# Patient Record
Sex: FEMALE | Race: WHITE | NOT HISPANIC OR LATINO | Employment: FULL TIME | URBAN - METROPOLITAN AREA
[De-identification: names, ages, dates, MRNs, and addresses within clinical notes are randomized per-mention and may not be internally consistent; named-entity substitution may affect disease eponyms.]

---

## 2018-06-14 ENCOUNTER — APPOINTMENT (OUTPATIENT)
Dept: URGENT CARE | Facility: CLINIC | Age: 38
End: 2018-06-14

## 2018-06-14 DIAGNOSIS — Z02.1 PRE-EMPLOYMENT HEALTH SCREENING EXAMINATION: Primary | ICD-10-CM

## 2018-06-14 PROCEDURE — 86480 TB TEST CELL IMMUN MEASURE: CPT | Performed by: NURSE PRACTITIONER

## 2018-06-14 PROCEDURE — 86706 HEP B SURFACE ANTIBODY: CPT | Performed by: NURSE PRACTITIONER

## 2018-06-15 LAB — HBV SURFACE AB SER-ACNC: <3.1 MIU/ML

## 2018-06-18 LAB — QUANTIFERON-TB GOLD IN TUBE: NORMAL

## 2018-08-27 ENCOUNTER — APPOINTMENT (OUTPATIENT)
Dept: LAB | Facility: HOSPITAL | Age: 38
End: 2018-08-27

## 2018-08-27 ENCOUNTER — TRANSCRIBE ORDERS (OUTPATIENT)
Dept: ADMINISTRATIVE | Facility: HOSPITAL | Age: 38
End: 2018-08-27

## 2018-08-27 DIAGNOSIS — Z00.8 HEALTH EXAMINATION IN POPULATION SURVEY: Primary | ICD-10-CM

## 2018-08-27 DIAGNOSIS — Z00.8 HEALTH EXAMINATION IN POPULATION SURVEY: ICD-10-CM

## 2018-08-27 LAB
CHOLEST SERPL-MCNC: 190 MG/DL
EST. AVERAGE GLUCOSE BLD GHB EST-MCNC: 117 MG/DL
HBA1C MFR BLD: 5.7 % (ref 4.2–6.3)
HDLC SERPL-MCNC: 36 MG/DL (ref 40–59)
LDLC SERPL CALC-MCNC: 127 MG/DL
NONHDLC SERPL-MCNC: 154 MG/DL
TRIGL SERPL-MCNC: 136 MG/DL

## 2018-08-27 PROCEDURE — 36415 COLL VENOUS BLD VENIPUNCTURE: CPT

## 2018-08-27 PROCEDURE — 83036 HEMOGLOBIN GLYCOSYLATED A1C: CPT

## 2018-08-27 PROCEDURE — 80061 LIPID PANEL: CPT

## 2018-10-16 ENCOUNTER — OFFICE VISIT (OUTPATIENT)
Dept: URGENT CARE | Facility: MEDICAL CENTER | Age: 38
End: 2018-10-16
Payer: COMMERCIAL

## 2018-10-16 VITALS
DIASTOLIC BLOOD PRESSURE: 80 MMHG | HEIGHT: 62 IN | OXYGEN SATURATION: 100 % | WEIGHT: 248 LBS | RESPIRATION RATE: 20 BRPM | BODY MASS INDEX: 45.64 KG/M2 | HEART RATE: 96 BPM | SYSTOLIC BLOOD PRESSURE: 122 MMHG | TEMPERATURE: 97.5 F

## 2018-10-16 DIAGNOSIS — J02.9 SORE THROAT: Primary | ICD-10-CM

## 2018-10-16 LAB — S PYO AG THROAT QL: NEGATIVE

## 2018-10-16 PROCEDURE — 99203 OFFICE O/P NEW LOW 30 MIN: CPT | Performed by: PHYSICIAN ASSISTANT

## 2018-10-16 PROCEDURE — S9088 SERVICES PROVIDED IN URGENT: HCPCS | Performed by: PHYSICIAN ASSISTANT

## 2018-10-16 PROCEDURE — 87430 STREP A AG IA: CPT | Performed by: PHYSICIAN ASSISTANT

## 2018-10-16 RX ORDER — BROMPHENIRAMINE MALEATE, PSEUDOEPHEDRINE HYDROCHLORIDE, AND DEXTROMETHORPHAN HYDROBROMIDE 2; 30; 10 MG/5ML; MG/5ML; MG/5ML
10 SYRUP ORAL 4 TIMES DAILY PRN
Qty: 118 ML | Refills: 0 | Status: SHIPPED | OUTPATIENT
Start: 2018-10-16 | End: 2018-10-21

## 2018-10-16 RX ORDER — LEVOTHYROXINE SODIUM 0.05 MG/1
50 TABLET ORAL DAILY
COMMUNITY
End: 2019-03-06 | Stop reason: SDUPTHER

## 2018-10-16 NOTE — PATIENT INSTRUCTIONS
Acute Cough   AMBULATORY CARE:   An acute cough  can last up to 3 weeks  Common causes of an acute cough include a cold, allergies, or a lung infection  Seek care immediately if:   · You have trouble breathing or feel short of breath  · You cough up blood, or you see blood in your mucus  · You faint or feel weak or dizzy  · You have chest pain when you cough or take a deep breath  · You have new wheezing  Contact your healthcare provider if:   · You have a fever  · Your cough lasts longer than 4 weeks  · Your symptoms do not improve with treatment  · You have questions or concerns about your condition or care  Treatment:  An acute cough usually goes away on its own  Ask your healthcare provider about medicines you can take to decrease your cough  You may need medicine to stop the cough, decrease swelling in your airways, or help open your airways  Medicine may also be given to help you cough up mucus  If you have an infection caused by bacteria, you may need antibiotics  Manage your symptoms:   · Do not smoke and stay away from others who smoke  Nicotine and other chemicals in cigarettes and cigars can cause lung damage and make your cough worse  Ask your healthcare provider for information if you currently smoke and need help to quit  E-cigarettes or smokeless tobacco still contain nicotine  Talk to your healthcare provider before you use these products  · Drink extra liquids as directed  Liquids will help thin and loosen mucus so you can cough it up  Liquids will also help prevent dehydration  Examples of good liquids to drink include water, fruit juice, and broth  Do not drink liquids that contain caffeine  Caffeine can increase your risk for dehydration  Ask your healthcare provider how much liquid to drink each day  · Rest as directed  Do not do activities that make your cough worse, such as exercise  · Use a humidifier or vaporizer    Use a cool mist humidifier or a vaporizer to increase air moisture in your home  This may make it easier for you to breathe and help decrease your cough  · Eat 2 to 5 mL of honey 2 times each day  Honey can help thin mucus and decrease your cough  · Use cough drops or lozenges  These can help decrease throat irritation and your cough  Follow up with your healthcare provider as directed:  Write down your questions so you remember to ask them during your visits  © 2017 [facility] Information is for End User's use only and may not be sold, redistributed or otherwise used for commercial purposes  All illustrations and images included in CareNotes® are the copyrighted property of A D [employer] M [employer] Inc  or Ed Chiang  The above information is an  only  It is not intended as medical advice for individual conditions or treatments  Talk to your doctor, nurse or pharmacist before following any medical regimen to see if it is safe and effective for you

## 2018-10-16 NOTE — PROGRESS NOTES
[facility] [facility]      NAME: Kimi Pimentel is a 45 y o  female  : 1980    MRN: [id number]  DATE: 2018  TIME: 10:10 AM    Assessment and Plan   Sore throat [J02 9]  1  Sore throat  POCT rapid strepA    brompheniramine-pseudoephedrine-DM 30-2-10 MG/5ML syrup       Patient Instructions     In office rapid strep was negative, await throat culture  Take prescribed medication as directed  Encourage increase intake of fluids  May alternate Tylenol/ibuprofen as needed for discomfort  Using a humidifier in the room may be on benefit  Saline Nasal Spray and throat lozenges as needed  Please go to ER if any signs of distress or systemic infections  Recommend symptomatic management at this time and follow up with PCP if symptoms persist in 5-7 days  Chief Complaint     Chief Complaint   Patient presents with    Cough     for 2 days  Denies fever , chills or bodyaches   Sore Throat         History of Present Illness   Kimi Pimentel presents to the clinic c/o      51-year-old female, presents for evaluation of a cough, x2 days  She does have a history of seasonal allergies  She denies any fever, respiratory distress, chest pain, shortness of breath difficulty breathing        Review of Systems   Review of Systems   Constitutional: Negative for fever  Respiratory: Positive for cough  Cardiovascular: Negative for chest pain           Current Medications     Long-Term Prescriptions   Medication Sig Dispense Refill    levothyroxine 50 mcg tablet Take 50 mcg by mouth daily         Current Allergies     Allergies as of 10/16/2018    (No Known Allergies)            The following portions of the patient's history were reviewed and updated as appropriate: allergies, current medications, past family history, past medical history, past social history, past surgical history and problem list     HISTORICAL INFO:  Past Medical History:   Diagnosis Date    Disease of thyroid gland      No past surgical history on file  Objective   /80 (BP Location: Right arm, Patient Position: Sitting, Cuff Size: Standard)   Pulse 96   Temp 97 5 °F (36 4 °C)   Resp 20   Ht 5' 2" (1 575 m)   Wt 112 kg (248 lb)   SpO2 100%   BMI 45 36 kg/m²        Physical Exam     Physical Exam   Constitutional: She appears well-developed and well-nourished  No distress  HENT:   Head: Normocephalic and atraumatic  Mouth/Throat: Oropharynx is clear and moist    Eyes: Pupils are equal, round, and reactive to light  Conjunctivae are normal    Neck: Normal range of motion  Neck supple  No tracheal deviation present  No thyromegaly present  Cardiovascular: Normal rate, regular rhythm and normal heart sounds  Pulmonary/Chest: Effort normal and breath sounds normal  No respiratory distress  She has no wheezes  She has no rales  Lymphadenopathy:     She has no cervical adenopathy  Skin: Skin is warm and dry  She is not diaphoretic  Nursing note and vitals reviewed  M*Modal software was used to dictate this note  It may contain errors with dictating incorrect words/spelling  Please contact provider directly for any questions

## 2018-11-23 ENCOUNTER — APPOINTMENT (EMERGENCY)
Dept: RADIOLOGY | Facility: HOSPITAL | Age: 38
End: 2018-11-23
Payer: COMMERCIAL

## 2018-11-23 ENCOUNTER — HOSPITAL ENCOUNTER (EMERGENCY)
Facility: HOSPITAL | Age: 38
Discharge: HOME | End: 2018-11-23
Attending: EMERGENCY MEDICINE | Admitting: EMERGENCY MEDICINE
Payer: COMMERCIAL

## 2018-11-23 VITALS
OXYGEN SATURATION: 99 % | HEART RATE: 113 BPM | RESPIRATION RATE: 17 BRPM | BODY MASS INDEX: 44.35 KG/M2 | DIASTOLIC BLOOD PRESSURE: 91 MMHG | WEIGHT: 241 LBS | SYSTOLIC BLOOD PRESSURE: 154 MMHG | HEIGHT: 62 IN | TEMPERATURE: 99.1 F

## 2018-11-23 DIAGNOSIS — J40 BRONCHITIS: Primary | ICD-10-CM

## 2018-11-23 LAB — S PYO AG THROAT QL: NEGATIVE

## 2018-11-23 PROCEDURE — 99283 EMERGENCY DEPT VISIT LOW MDM: CPT

## 2018-11-23 PROCEDURE — 94640 AIRWAY INHALATION TREATMENT: CPT

## 2018-11-23 PROCEDURE — 71046 X-RAY EXAM CHEST 2 VIEWS: CPT

## 2018-11-23 PROCEDURE — 87430 STREP A AG IA: CPT | Performed by: NURSE PRACTITIONER

## 2018-11-23 RX ORDER — BENZONATATE 200 MG/1
200 CAPSULE ORAL 3 TIMES DAILY PRN
Qty: 15 CAPSULE | Refills: 0 | Status: SHIPPED | OUTPATIENT
Start: 2018-11-23 | End: 2018-11-28

## 2018-11-23 RX ORDER — ALBUTEROL SULFATE 90 UG/1
2 AEROSOL, METERED RESPIRATORY (INHALATION) EVERY 4 HOURS PRN
Qty: 1 INHALER | Refills: 0 | Status: SHIPPED | OUTPATIENT
Start: 2018-11-23 | End: 2020-11-13 | Stop reason: SDUPTHER

## 2018-11-23 RX ORDER — PREDNISONE 20 MG/1
60 TABLET ORAL ONCE
Status: COMPLETED | OUTPATIENT
Start: 2018-11-23 | End: 2018-11-23

## 2018-11-23 RX ORDER — ACETAMINOPHEN 325 MG/1
975 TABLET ORAL ONCE
Status: COMPLETED | OUTPATIENT
Start: 2018-11-23 | End: 2018-11-23

## 2018-11-23 RX ORDER — PREDNISONE 20 MG/1
60 TABLET ORAL DAILY
Qty: 15 TABLET | Refills: 0 | Status: SHIPPED | OUTPATIENT
Start: 2018-11-23 | End: 2018-11-28

## 2018-11-23 RX ORDER — BENZONATATE 100 MG/1
200 CAPSULE ORAL ONCE
Status: COMPLETED | OUTPATIENT
Start: 2018-11-23 | End: 2018-11-23

## 2018-11-23 RX ORDER — IPRATROPIUM BROMIDE AND ALBUTEROL SULFATE 2.5; .5 MG/3ML; MG/3ML
3 SOLUTION RESPIRATORY (INHALATION) ONCE
Status: COMPLETED | OUTPATIENT
Start: 2018-11-23 | End: 2018-11-23

## 2018-11-23 RX ADMIN — LIDOCAINE HYDROCHLORIDE 10 ML: 20 SOLUTION ORAL; TOPICAL at 07:44

## 2018-11-23 RX ADMIN — ACETAMINOPHEN 975 MG: 325 TABLET ORAL at 07:45

## 2018-11-23 RX ADMIN — IPRATROPIUM BROMIDE AND ALBUTEROL SULFATE 3 ML: 2.5; .5 SOLUTION RESPIRATORY (INHALATION) at 07:45

## 2018-11-23 RX ADMIN — PREDNISONE 60 MG: 20 TABLET ORAL at 08:40

## 2018-11-23 RX ADMIN — BENZONATATE 200 MG: 100 CAPSULE ORAL at 08:39

## 2018-11-23 NOTE — ED PROVIDER NOTES
History  Chief Complaint   Patient presents with    Cough     I have a cough for 2 days, along with pain on swallowing  I get chest discomfort/ pain when I take a deep breath  Patient is a 58-year-old female presenting to the emergency department reporting a 2 day complaint of worsening sore throat with the development of a hacking, productive cough of yellow/green mucus  Patient denies any known fevers, but reports feeling chilled and hot  She reports some mild headache and nasal congestion, which she says is typical of her seasonal allergy symptoms  Additionally reporting some chest pain/tightness with cough  No report of cardiac chest pain/discomfort  Denies any lightheaded or dizziness  No abdominal pain, nausea, vomiting or diarrhea  No rash  Otherwise reports no other symptoms  Denies any other concerns  Prior to Admission Medications   Prescriptions Last Dose Informant Patient Reported? Taking?   levothyroxine 50 mcg tablet   Yes No   Sig: Take 50 mcg by mouth daily      Facility-Administered Medications: None       Past Medical History:   Diagnosis Date    Disease of thyroid gland        History reviewed  No pertinent surgical history  History reviewed  No pertinent family history  I have reviewed and agree with the history as documented  Social History   Substance Use Topics    Smoking status: Never Smoker    Smokeless tobacco: Never Used    Alcohol use No        Review of Systems   Constitutional: Positive for chills and fatigue  Negative for fever  HENT: Positive for congestion, postnasal drip and sore throat  Negative for ear discharge, ear pain, facial swelling, nosebleeds, rhinorrhea, sinus pain, sinus pressure, tinnitus, trouble swallowing and voice change  Eyes: Negative for pain, discharge, redness and itching  Respiratory: Positive for cough and chest tightness  Negative for shortness of breath and wheezing      Cardiovascular: Negative for chest pain and palpitations  Gastrointestinal: Negative for abdominal pain, diarrhea, nausea and vomiting  Musculoskeletal: Negative for arthralgias, joint swelling, neck pain and neck stiffness  Skin: Negative for rash  Allergic/Immunologic: Negative for immunocompromised state  Neurological: Negative for dizziness, light-headedness, numbness and headaches  Hematological: Negative for adenopathy  Physical Exam  Physical Exam   Constitutional: She is oriented to person, place, and time  She appears well-developed and well-nourished  Non-toxic appearance  She does not have a sickly appearance  She does not appear ill  No distress  HENT:   Right Ear: Tympanic membrane and ear canal normal    Left Ear: Tympanic membrane and ear canal normal    Nose: Mucosal edema present  No rhinorrhea, nose lacerations, sinus tenderness or nasal deformity  Right sinus exhibits no maxillary sinus tenderness and no frontal sinus tenderness  Left sinus exhibits no maxillary sinus tenderness and no frontal sinus tenderness  Mouth/Throat: Uvula is midline and mucous membranes are normal  No trismus in the jaw  No uvula swelling  Posterior oropharyngeal erythema present  No oropharyngeal exudate, posterior oropharyngeal edema or tonsillar abscesses  Tonsils are 2+ on the right  Tonsils are 2+ on the left  No tonsillar exudate  Eyes: Conjunctivae are normal    Neck: Normal range of motion  Neck supple  Cardiovascular: Regular rhythm  Tachycardia present  Pulmonary/Chest: Effort normal  No accessory muscle usage  No tachypnea  No respiratory distress  She has no decreased breath sounds  She has wheezes (Diffuse, bilateral, on expiration  Seems to clear/change with cough )  She has no rhonchi  She has no rales  She exhibits tenderness  She exhibits no crepitus  Lymphadenopathy:     She has no cervical adenopathy  Neurological: She is alert and oriented to person, place, and time  Skin: Skin is warm and dry   No rash noted    Psychiatric: She has a normal mood and affect  Nursing note and vitals reviewed  Vital Signs  ED Triage Vitals   Temperature Pulse Respirations Blood Pressure SpO2   11/23/18 0720 11/23/18 0720 11/23/18 0720 11/23/18 0720 11/23/18 0720   98 2 °F (36 8 °C) (!) 108 18 (!) 187/104 97 %      Temp Source Heart Rate Source Patient Position - Orthostatic VS BP Location FiO2 (%)   11/23/18 0720 11/23/18 0720 11/23/18 0720 11/23/18 0720 --   Tymp Core Monitor Sitting Left arm       Pain Score       11/23/18 0745       4           Vitals:    11/23/18 0720 11/23/18 0843   BP: (!) 187/104 154/91   Pulse: (!) 108 (!) 113   Patient Position - Orthostatic VS: Sitting Sitting       Visual Acuity      ED Medications  Medications   acetaminophen (TYLENOL) tablet 975 mg (975 mg Oral Given 11/23/18 0745)   ipratropium-albuterol (DUO-NEB) 0 5-2 5 mg/3 mL inhalation solution 3 mL (3 mL Nebulization Given 11/23/18 0745)   lidocaine viscous (XYLOCAINE) 2 % mucosal solution 10 mL (10 mL Swish & Swallow Given 11/23/18 0744)   predniSONE tablet 60 mg (60 mg Oral Given 11/23/18 0840)   benzonatate (TESSALON PERLES) capsule 200 mg (200 mg Oral Given 11/23/18 0839)       Diagnostic Studies  Results Reviewed     Procedure Component Value Units Date/Time    Rapid Strep A Screen Only, Adults [[id number]]  (Normal) Collected:  11/23/18 0746    Lab Status:  Final result Specimen:  Throat from Throat Updated:  11/23/18 0805     Rapid Strep A Screen Negative                 XR chest 2 views   Final Result by Roger Ramirez MD (11/23 2180)      No acute cardiopulmonary disease  Workstation performed: [id number]                    Procedures  Procedures       Phone Contacts  ED Phone Contact    ED Course                               MDM  Number of Diagnoses or Management Options  Bronchitis: new and requires workup  Diagnosis management comments: Strep swab negative  Chest x-ray shows no acute pulmonary abnormality    Will treat symptomatically for bronchitis  Patient was instructed follow up with her primary care provider early next week for re-evaluation  Instructed to return to the ED with any worsening symptoms or emergent concerns  Amount and/or Complexity of Data Reviewed  Clinical lab tests: ordered and reviewed  Tests in the radiology section of CPT®: ordered and reviewed  Independent visualization of images, tracings, or specimens: yes    Patient Progress  Patient progress: stable    CritCare Time    Disposition  Final diagnoses:   Bronchitis     Time reflects when diagnosis was documented in both MDM as applicable and the Disposition within this note     Time User Action Codes Description Comment    11/23/2018  8:23 AM Dimitry [address] [facility] Bronchitis       ED Disposition     ED Disposition Condition Comment    Discharge  Kimi Pimentel discharge to home/self care  Condition at discharge: Stable        Follow-up Information     Follow up With Specialties Details Why Contact Info    Charles Loera  In 3 days For re-evaluation/follow-up, as discussed   [address] [facility] Emergency Department Emergency Medicine Go to As needed, If symptoms worsen [address] [address] [phone]          Patient's Medications   Discharge Prescriptions    ALBUTEROL (PROVENTIL HFA,VENTOLIN HFA) 90 MCG/ACT INHALER    Inhale 2 puffs every 4 (four) hours as needed for wheezing       Start Date: 11/23/2018End Date: --       Order Dose: 2 puffs       Quantity: 1 Inhaler    Refills: 0    BENZONATATE (TESSALON) 200 MG CAPSULE    Take 1 capsule (200 mg total) by mouth 3 (three) times a day as needed for cough for up to 5 days       Start Date: 11/23/2018End Date: 11/28/2018       Order Dose: 200 mg       Quantity: 15 capsule    Refills: 0    PREDNISONE 20 MG TABLET    Take 3 tablets (60 mg total) by mouth daily for 5 days       Start Date: 11/23/2018End Date: 11/28/2018       Order Dose: 60 mg       Quantity: 15 tablet    Refills: 0     No discharge procedures on file      ED Provider  Electronically Signed by           RAFAELA Jarquin  11/23/18 2088

## 2018-11-23 NOTE — DISCHARGE INSTRUCTIONS
Acute Bronchitis   WHAT YOU NEED TO KNOW:   Acute bronchitis is swelling and irritation in the air passages of your lungs  This irritation may cause you to cough or have other breathing problems  Acute bronchitis often starts because of another illness, such as a cold or the flu  The illness spreads from your nose and throat to your windpipe and airways  Bronchitis is often called a chest cold  Acute bronchitis lasts about 3 to 6 weeks and is usually not a serious illness  Your cough can last for several weeks  DISCHARGE INSTRUCTIONS:   Return to the emergency department if:   · You cough up blood  · Your lips or fingernails turn blue  · You feel like you are not getting enough air when you breathe  Contact your healthcare provider if:   · You have a fever  · Your breathing problems do not go away or get worse  · Your cough does not get better within 4 weeks  · You have questions or concerns about your condition or care  Self-care:   · Get more rest   Rest helps your body to heal  Slowly start to do more each day  Rest when you feel it is needed  · Avoid irritants in the air  Avoid chemicals, fumes, and dust  Wear a face mask if you must work around dust or fumes  Stay inside on days when air pollution levels are high  If you have allergies, stay inside when pollen counts are high  Do not use aerosol products, such as spray-on deodorant, bug spray, and hair spray  · Do not smoke or be around others who smoke  Nicotine and other chemicals in cigarettes and cigars damages the cilia that move mucus out of your lungs  Ask your healthcare provider for information if you currently smoke and need help to quit  E-cigarettes or smokeless tobacco still contain nicotine  Talk to your healthcare provider before you use these products  · Drink liquids as directed  Liquids help keep your air passages moist and help you cough up mucus   You may need to drink more liquids when you have acute bronchitis  Ask how much liquid to drink each day and which liquids are best for you  · Use a humidifier or vaporizer  Use a cool mist humidifier or a vaporizer to increase air moisture in your home  This may make it easier for you to breathe and help decrease your cough  Decrease risk for acute bronchitis:   · Get the vaccinations you need  Ask your healthcare provider if you should get vaccinated against the flu or pneumonia  · Prevent the spread of germs  You can decrease your risk of acute bronchitis and other illnesses by doing the following:     [facility] your hands often with soap and water  Carry germ-killing hand lotion or gel with you  You can use the lotion or gel to clean your hands when soap and water are not available  ¨ Do not touch your eyes, nose, or mouth unless you have washed your hands first     ¨ Always cover your mouth when you cough to prevent the spread of germs  It is best to cough into a tissue or your shirt sleeve instead of into your hand  Ask those around you cover their mouths when they cough  ¨ Try to avoid people who have a cold or the flu  If you are sick, stay away from others as much as possible  Medicines: Your healthcare provider may  give you any of the following:  · Ibuprofen or acetaminophen  are medicines that help lower your fever  They are available without a doctor's order  Ask your healthcare provider which medicine is right for you  Ask how much to take and how often to take it  Follow directions  These medicines can cause stomach bleeding if not taken correctly  Ibuprofen can cause kidney damage  Do not take ibuprofen if you have kidney disease, an ulcer, or allergies to aspirin  Acetaminophen can cause liver damage  Do not take more than 4,000 milligrams in 24 hours  · Decongestants  help loosen mucus in your lungs and make it easier to cough up  This can help you breathe easier  · Cough suppressants  decrease your urge to cough   If your cough produces mucus, do not take a cough suppressant unless your healthcare provider tells you to  Your healthcare provider may suggest that you take a cough suppressant at night so you can rest     · Inhalers  may be given  Your healthcare provider may give you one or more inhalers to help you breathe easier and cough less  An inhaler gives your medicine to open your airways  Ask your healthcare provider to show you how to use your inhaler correctly  · Take your medicine as directed  Contact your healthcare provider if you think your medicine is not helping or if you have side effects  Tell him of her if you are allergic to any medicine  Keep a list of the medicines, vitamins, and herbs you take  Include the amounts, and when and why you take them  Bring the list or the pill bottles to follow-up visits  Carry your medicine list with you in case of an emergency  Follow up with your healthcare provider as directed:  Write down questions you have so you will remember to ask them during your follow-up visits  © 2017 [employer] [employer] Information is for End User's use only and may not be sold, redistributed or otherwise used for commercial purposes  All illustrations and images included in CareNotes® are the copyrighted property of [employer]  or Ed Chiang  The above information is an  only  It is not intended as medical advice for individual conditions or treatments  Talk to your doctor, nurse or pharmacist before following any medical regimen to see if it is safe and effective for you

## 2019-01-02 ENCOUNTER — HOSPITAL ENCOUNTER (EMERGENCY)
Facility: HOSPITAL | Age: 39
Discharge: HOME | End: 2019-01-03
Attending: EMERGENCY MEDICINE | Admitting: EMERGENCY MEDICINE
Payer: COMMERCIAL

## 2019-01-02 DIAGNOSIS — B34.9 VIRAL SYNDROME: Primary | ICD-10-CM

## 2019-01-02 LAB
BASOPHILS # BLD AUTO: 0 THOUSANDS/ΜL (ref 0–0.1)
BASOPHILS NFR BLD AUTO: 0 % (ref 0–1)
EOSINOPHIL # BLD AUTO: 0.1 THOUSAND/ΜL (ref 0–0.4)
EOSINOPHIL NFR BLD AUTO: 1 % (ref 0–6)
ERYTHROCYTE [DISTWIDTH] IN BLOOD BY AUTOMATED COUNT: 18.9 %
EXT PREG TEST URINE: NEGATIVE
HCT VFR BLD AUTO: 42.8 % (ref 36–46)
HGB BLD-MCNC: 13.3 G/DL (ref 12–16)
LYMPHOCYTES # BLD AUTO: 0.7 THOUSANDS/ΜL (ref 0.5–4)
LYMPHOCYTES NFR BLD AUTO: 10 % (ref 25–45)
MCH RBC QN AUTO: 23.6 PG (ref 26–34)
MCHC RBC AUTO-ENTMCNC: 31 G/DL (ref 31–36)
MCV RBC AUTO: 76 FL (ref 80–100)
MONOCYTES # BLD AUTO: 0.3 THOUSAND/ΜL (ref 0.2–0.9)
MONOCYTES NFR BLD AUTO: 3 % (ref 1–10)
NEUTROPHILS # BLD AUTO: 6.5 THOUSANDS/ΜL (ref 1.8–7.8)
NEUTS SEG NFR BLD AUTO: 85 % (ref 45–65)
PLATELET # BLD AUTO: 279 THOUSANDS/UL (ref 150–450)
PMV BLD AUTO: 9.4 FL (ref 8.9–12.7)
RBC # BLD AUTO: 5.63 MILLION/UL (ref 4–5.2)
WBC # BLD AUTO: 7.6 THOUSAND/UL (ref 4.5–11)

## 2019-01-02 PROCEDURE — 81025 URINE PREGNANCY TEST: CPT | Performed by: EMERGENCY MEDICINE

## 2019-01-02 PROCEDURE — 80053 COMPREHEN METABOLIC PANEL: CPT | Performed by: EMERGENCY MEDICINE

## 2019-01-02 PROCEDURE — 85025 COMPLETE CBC W/AUTO DIFF WBC: CPT | Performed by: EMERGENCY MEDICINE

## 2019-01-02 PROCEDURE — 36415 COLL VENOUS BLD VENIPUNCTURE: CPT | Performed by: EMERGENCY MEDICINE

## 2019-01-02 PROCEDURE — 83690 ASSAY OF LIPASE: CPT | Performed by: EMERGENCY MEDICINE

## 2019-01-02 PROCEDURE — 96374 THER/PROPH/DIAG INJ IV PUSH: CPT

## 2019-01-02 PROCEDURE — 99284 EMERGENCY DEPT VISIT MOD MDM: CPT

## 2019-01-02 RX ORDER — ONDANSETRON 2 MG/ML
4 INJECTION INTRAMUSCULAR; INTRAVENOUS ONCE
Status: COMPLETED | OUTPATIENT
Start: 2019-01-02 | End: 2019-01-02

## 2019-01-02 RX ADMIN — ONDANSETRON HYDROCHLORIDE 4 MG: 2 INJECTION, SOLUTION INTRAMUSCULAR; INTRAVENOUS at 23:49

## 2019-01-03 VITALS
SYSTOLIC BLOOD PRESSURE: 122 MMHG | WEIGHT: 232.56 LBS | TEMPERATURE: 99.1 F | OXYGEN SATURATION: 96 % | RESPIRATION RATE: 20 BRPM | DIASTOLIC BLOOD PRESSURE: 65 MMHG | HEART RATE: 94 BPM | BODY MASS INDEX: 42.54 KG/M2

## 2019-01-03 LAB
ALBUMIN SERPL BCP-MCNC: 4.5 G/DL (ref 3–5.2)
ALP SERPL-CCNC: 108 U/L (ref 43–122)
ALT SERPL W P-5'-P-CCNC: 40 U/L (ref 9–52)
ANION GAP SERPL CALCULATED.3IONS-SCNC: 9 MMOL/L (ref 5–14)
ANISOCYTOSIS BLD QL SMEAR: PRESENT
AST SERPL W P-5'-P-CCNC: 28 U/L (ref 14–36)
BILIRUB SERPL-MCNC: 0.6 MG/DL
BUN SERPL-MCNC: 14 MG/DL (ref 5–25)
CALCIUM SERPL-MCNC: 9.7 MG/DL (ref 8.4–10.2)
CHLORIDE SERPL-SCNC: 104 MMOL/L (ref 97–108)
CO2 SERPL-SCNC: 25 MMOL/L (ref 22–30)
CREAT SERPL-MCNC: 0.71 MG/DL (ref 0.6–1.2)
GFR SERPL CREATININE-BSD FRML MDRD: 108 ML/MIN/1.73SQ M
GLUCOSE SERPL-MCNC: 100 MG/DL (ref 70–99)
LIPASE SERPL-CCNC: 52 U/L (ref 23–300)
MICROCYTES BLD QL AUTO: PRESENT
PLATELET BLD QL SMEAR: ADEQUATE
POTASSIUM SERPL-SCNC: 4.2 MMOL/L (ref 3.6–5)
PROT SERPL-MCNC: 8.5 G/DL (ref 5.9–8.4)
RBC MORPH BLD: NORMAL
SODIUM SERPL-SCNC: 138 MMOL/L (ref 137–147)

## 2019-01-03 PROCEDURE — 96361 HYDRATE IV INFUSION ADD-ON: CPT

## 2019-01-03 RX ORDER — ACETAMINOPHEN 325 MG/1
650 TABLET ORAL ONCE
Status: COMPLETED | OUTPATIENT
Start: 2019-01-03 | End: 2019-01-03

## 2019-01-03 RX ORDER — ONDANSETRON 8 MG/1
8 TABLET, ORALLY DISINTEGRATING ORAL EVERY 8 HOURS PRN
Qty: 20 TABLET | Refills: 0 | Status: SHIPPED | OUTPATIENT
Start: 2019-01-03 | End: 2019-03-06 | Stop reason: ALTCHOICE

## 2019-01-03 RX ADMIN — ACETAMINOPHEN 650 MG: 325 TABLET ORAL at 02:46

## 2019-01-03 RX ADMIN — SODIUM CHLORIDE 1000 ML: 0.9 INJECTION, SOLUTION INTRAVENOUS at 02:38

## 2019-01-03 RX ADMIN — SODIUM CHLORIDE 1000 ML: 9 INJECTION, SOLUTION INTRAVENOUS at 00:04

## 2019-01-03 NOTE — ED TRIAGE NOTES
Reports her kids were sick over [place]  Pt complaining of dizziness and nausea  Reports she just vomited not too long abd  Reports mid upper abd - constant, describes as  Sharp  Denies fevers   Started today

## 2019-01-03 NOTE — DISCHARGE INSTRUCTIONS
Viral Syndrome   WHAT YOU NEED TO KNOW:   Viral syndrome is a term used for a viral infection that has no clear cause  Viruses are spread easily from person to person through the air and on shared items  DISCHARGE INSTRUCTIONS:   Call 911 for the following:   · You have a seizure  · You cannot be woken  · You have chest pain or trouble breathing  Return to the emergency department if:   · You have a stiff neck, a bad headache, and sensitivity to light  · You feel weak, dizzy, or confused  · You stop urinating or urinate a lot less than normal      · You cough up blood or thick, yellow or green, mucus  · You have severe abdominal pain or your abdomen is larger than usual   Contact your healthcare provider if:   · Your symptoms do not get better with treatment, or get worse, after 3 days  · You have a rash or ear pain  · You have burning when you urinate  · You have questions or concerns about your condition or care  Medicines: You may  need any of the following:  · Acetaminophen  decreases pain and fever  It is available without a doctor's order  Ask how much medicine to take and how often to take it  Follow directions  Acetaminophen can cause liver damage if not taken correctly  · NSAIDs , such as ibuprofen, help decrease swelling, pain, and fever  NSAIDs can cause stomach bleeding or kidney problems in certain people  If you take blood thinner medicine, always ask your healthcare provider if NSAIDs are safe for you  Always read the medicine label and follow directions  · Cold medicine  helps decrease swelling, control a cough, and relieve chest or nasal congestion  · Saline nasal spray  helps decrease nasal congestion  · Take your medicine as directed  Contact your healthcare provider if you think your medicine is not helping or if you have side effects  Tell him of her if you are allergic to any medicine   Keep a list of the medicines, vitamins, and herbs you take  Include the amounts, and when and why you take them  Bring the list or the pill bottles to follow-up visits  Carry your medicine list with you in case of an emergency  Manage your symptoms:   · Drink liquids as directed  to prevent dehydration  Ask how much liquid to drink each day and which liquids are best for you  Ask if you should drink an oral rehydration solution (ORS)  An ORS has the right amounts of water, salts, and sugar you need to replace body fluids  This may help prevent dehydration caused by vomiting or diarrhea  Do not drink liquids with caffeine  Drinks with caffeine can make dehydration worse  · Get plenty of rest  to help your body heal  Take naps throughout the day  Ask your healthcare provider when you can return to work and your normal activities  · Use a cool mist humidifier  to help you breathe easier if you have nasal or chest congestion  Ask your healthcare provider how to use a cool mist humidifier  · Eat honey or use cough drops  to help decrease throat discomfort  Ask your healthcare provider how much honey you should eat each day  Cough drops are available without a doctor's order  Follow directions for taking cough drops  · Do not smoke and stay away from others who smoke  Nicotine and other chemicals in cigarettes and cigars can cause lung damage  Smoking can also delay healing  Ask your healthcare provider for information if you currently smoke and need help to quit  E-cigarettes or smokeless tobacco still contain nicotine  Talk to your healthcare provider before you use these products  · Wash your hands frequently  to prevent the spread of germs to others  Use soap and water  Use gel hand  when soap and water are not available  Wash your hands after you use the bathroom, cough, or sneeze  Wash your hands before you prepare or eat food    Follow up with your healthcare provider as directed:  Write down your questions so you remember to ask them during your visits  © 2017 [address] [employer] Information is for End User's use only and may not be sold, redistributed or otherwise used for commercial purposes  All illustrations and images included in CareNotes® are the copyrighted property of A D A M [employer] Inc  or Ed Chiang  The above information is an  only  It is not intended as medical advice for individual conditions or treatments  Talk to your doctor, nurse or pharmacist before following any medical regimen to see if it is safe and effective for you

## 2019-01-03 NOTE — ED PROVIDER NOTES
History  Chief Complaint   Patient presents with    Abdominal Pain    Dizziness     46 y/o female c/o nausea, vomiting, diarrhea, and dizziness which started earlier this afternoon after arriving for work  She states that she has been around her children who all experienced similar symptoms  She states that the dizziness is intermittent but worsens upon standing  She states that she is unable to keep anything down by mouth and hasn't been able to eat anything since this morning  Abdominal Pain   Associated symptoms: diarrhea, nausea and vomiting    Dizziness   Associated symptoms: diarrhea, nausea and vomiting        Prior to Admission Medications   Prescriptions Last Dose Informant Patient Reported? Taking? albuterol (PROVENTIL HFA,VENTOLIN HFA) 90 mcg/act inhaler   No No   Sig: Inhale 2 puffs every 4 (four) hours as needed for wheezing   levothyroxine 50 mcg tablet   Yes No   Sig: Take 50 mcg by mouth daily      Facility-Administered Medications: None       Past Medical History:   Diagnosis Date    Disease of thyroid gland        Past Surgical History:   Procedure Laterality Date     SECTION      CHOLECYSTECTOMY         History reviewed  No pertinent family history  I have reviewed and agree with the history as documented  Social History   Substance Use Topics    Smoking status: Never Smoker    Smokeless tobacco: Never Used    Alcohol use No        Review of Systems   Constitutional: Positive for appetite change  Gastrointestinal: Positive for abdominal pain, diarrhea, nausea and vomiting  Neurological: Positive for dizziness  All other systems reviewed and are negative  Physical Exam  Physical Exam   Constitutional: She is oriented to person, place, and time  She appears well-developed and well-nourished  Obese    HENT:   Head: Normocephalic and atraumatic  Mouth/Throat: Oropharyngeal exudate present  Eyes: Pupils are equal, round, and reactive to light   EOM are normal    Neck: Normal range of motion  Neck supple  No thyromegaly present  Cardiovascular: Regular rhythm and normal heart sounds  Pulmonary/Chest: Effort normal and breath sounds normal    Abdominal: Soft  She exhibits distension  Bowel sounds are increased  There is tenderness in the left lower quadrant  There is no tenderness at McBurney's point and negative Ramirez's sign  No hernia  Musculoskeletal: Normal range of motion  Neurological: She is alert and oriented to person, place, and time  Skin: Skin is warm and dry  Capillary refill takes less than 2 seconds  Psychiatric: She has a normal mood and affect  Vitals reviewed        Vital Signs  ED Triage Vitals [01/02/19 2244]   Temperature Pulse Respirations Blood Pressure SpO2   99 1 °F (37 3 °C) (!) 122 (!) 24 155/81 97 %      Temp Source Heart Rate Source Patient Position - Orthostatic VS BP Location FiO2 (%)   Tympanic Monitor Sitting Left arm --      Pain Score       5           Vitals:    01/02/19 2244   BP: 155/81   Pulse: (!) 122   Patient Position - Orthostatic VS: Sitting       Visual Acuity      ED Medications  Medications   sodium chloride 0 9 % bolus 1,000 mL (0 mL Intravenous Stopped 1/3/19 0115)   ondansetron (ZOFRAN) injection 4 mg (4 mg Intravenous Given 1/2/19 2349)   sodium chloride 0 9 % bolus 1,000 mL (1,000 mL Intravenous New Bag 1/3/19 0238)   acetaminophen (TYLENOL) tablet 650 mg (650 mg Oral Given 1/3/19 0246)       Diagnostic Studies  Results Reviewed     Procedure Component Value Units Date/Time    Lipase [[id number]]  (Normal) Collected:  01/02/19 2343    Lab Status:  Final result Specimen:  Blood from Line, Venous Updated:  01/03/19 0005     Lipase 52 u/L     Comprehensive metabolic panel [[id number]]  (Abnormal) Collected:  01/02/19 2343    Lab Status:  Final result Specimen:  Blood from Line, Venous Updated:  01/03/19 0004     Sodium 138 mmol/L      Potassium 4 2 mmol/L      Chloride 104 mmol/L      CO2 25 mmol/L ANION GAP 9 mmol/L      BUN 14 mg/dL      Creatinine 0 71 mg/dL      Glucose 100 (H) mg/dL      Calcium 9 7 mg/dL      AST 28 U/L      ALT 40 U/L      Alkaline Phosphatase 108 U/L      Total Protein 8 5 (H) g/dL      Albumin 4 5 g/dL      Total Bilirubin 0 60 mg/dL      eGFR 108 ml/min/1 73sq m     Narrative:         National Kidney Disease Education Program recommendations are as follows:  GFR calculation is accurate only with a steady state creatinine  Chronic Kidney disease less than 60 ml/min/1 73 sq  meters  Kidney failure less than 15 ml/min/1 73 sq  meters      CBC and differential [[id number]]  (Abnormal) Collected:  01/02/19 2343    Lab Status:  Final result Specimen:  Blood from Line, Venous Updated:  01/02/19 2355     WBC 7 60 Thousand/uL      RBC 5 63 (H) Million/uL      Hemoglobin 13 3 g/dL      Hematocrit 42 8 %      MCV 76 (L) fL      MCH 23 6 (L) pg      MCHC 31 0 g/dL      RDW 18 9 (H) %      MPV 9 4 fL      Platelets 723 Thousands/uL      Neutrophils Relative 85 (H) %      Lymphocytes Relative 10 (L) %      Monocytes Relative 3 %      Eosinophils Relative 1 %      Basophils Relative 0 %      Neutrophils Absolute 6 50 Thousands/µL      Lymphocytes Absolute 0 70 Thousands/µL      Monocytes Absolute 0 30 Thousand/µL      Eosinophils Absolute 0 10 Thousand/µL      Basophils Absolute 0 00 Thousands/µL     POCT pregnancy, urine [[id number]]  (Normal) Resulted:  01/02/19 2329    Lab Status:  Final result Updated:  01/02/19 2329     EXT PREG TEST UR (Ref: Negative) Negative                 No orders to display              Procedures  Procedures       Phone Contacts  ED Phone Contact    ED Course                               MDM  CritCare Time    Disposition  Final diagnoses:   Viral syndrome     Time reflects when diagnosis was documented in both MDM as applicable and the Disposition within this note     Time User Action Codes Description Comment    1/3/2019  3:48 AM Milbert Samples Add [B34 9] Viral syndrome       ED Disposition     ED Disposition Condition Comment    Discharge  Fadumo De La Cruz discharge to home/self care  Condition at discharge: Good        Follow-up Information     Follow up With Specialties Details Why Contact Info    Ashlie Channel  In 1 week  [address] [facility] [place] [id number] [phone]            Patient's Medications   Discharge Prescriptions    No medications on file     No discharge procedures on file      ED Provider  Electronically Signed by           Mariaelena Butterfield DO  01/03/19 9838

## 2019-01-28 ENCOUNTER — OFFICE VISIT (OUTPATIENT)
Dept: URGENT CARE | Age: 39
End: 2019-01-28
Payer: COMMERCIAL

## 2019-01-28 VITALS
RESPIRATION RATE: 18 BRPM | TEMPERATURE: 97.5 F | BODY MASS INDEX: 41.22 KG/M2 | SYSTOLIC BLOOD PRESSURE: 131 MMHG | OXYGEN SATURATION: 97 % | WEIGHT: 224 LBS | HEIGHT: 62 IN | HEART RATE: 91 BPM | DIASTOLIC BLOOD PRESSURE: 73 MMHG

## 2019-01-28 DIAGNOSIS — J02.9 SORE THROAT: ICD-10-CM

## 2019-01-28 DIAGNOSIS — J06.9 ACUTE URI: Primary | ICD-10-CM

## 2019-01-28 LAB — S PYO AG THROAT QL: NEGATIVE

## 2019-01-28 PROCEDURE — 87430 STREP A AG IA: CPT | Performed by: FAMILY MEDICINE

## 2019-01-28 PROCEDURE — S9088 SERVICES PROVIDED IN URGENT: HCPCS | Performed by: FAMILY MEDICINE

## 2019-01-28 PROCEDURE — 99213 OFFICE O/P EST LOW 20 MIN: CPT | Performed by: FAMILY MEDICINE

## 2019-01-28 RX ORDER — BENZONATATE 100 MG/1
100 CAPSULE ORAL 3 TIMES DAILY PRN
Qty: 20 CAPSULE | Refills: 0 | Status: SHIPPED | OUTPATIENT
Start: 2019-01-28 | End: 2019-03-06 | Stop reason: ALTCHOICE

## 2019-01-28 RX ORDER — BENZONATATE 100 MG/1
100 CAPSULE ORAL 3 TIMES DAILY PRN
Qty: 20 CAPSULE | Refills: 0 | Status: SHIPPED | OUTPATIENT
Start: 2019-01-28 | End: 2019-01-28 | Stop reason: SDUPTHER

## 2019-01-28 RX ORDER — AMOXICILLIN 875 MG/1
875 TABLET, COATED ORAL 2 TIMES DAILY
Qty: 20 TABLET | Refills: 0 | Status: SHIPPED | OUTPATIENT
Start: 2019-01-28 | End: 2019-02-07

## 2019-01-29 NOTE — PATIENT INSTRUCTIONS
As we discussed this is most likely VIRAL at this time  I would recommend starting Flonase and antihistamine to help with sinus pressure and congestion  Neti pot and nasal saline flushes can be helpful  Patient is insistent that this is a sinus infection as she has had them before  Discussed with patient I will give her a script to hang onto and she should only start antibiotic if in the next 48 hours symptoms are not improving she develops fevers  Recommended that she establish care with new family doctor  Offer to call the care now provider line to help set up appointment or give information for family doctor but she declined  Go to the ER with worsening symptoms

## 2019-01-29 NOTE — PROGRESS NOTES
[facility]        NAME: Aj Decker is a 44 y o  female  : 1980    MRN: [id number]  DATE: 2019  TIME: 9:43 PM    Assessment and Plan   Acute URI [J06 9]  1  Acute URI  amoxicillin (AMOXIL) 875 mg tablet    benzonatate (TESSALON PERLES) 100 mg capsule    DISCONTINUED: benzonatate (TESSALON PERLES) 100 mg capsule   2  Sore throat  POCT rapid strepA     Patient very insistent that the symptoms were typical for her sinus infections  Discussed with patient that this is still most likely viral as symptoms have only been there for 1 day  Patient states that she knows her body and this is how they start as this will turn into a sinus infection  Discussed with patient if she has recurrent sinu infections that she should follow up within ENT doctor  She states she recently moved to the area and has not established care here yet  Declined my offer to help her establish care here  Strict instructions given to only start antibiotic if symptoms persist over the next 48 hours  Patient verbalized understanding  Patient Instructions     Patient Instructions   As we discussed this is most likely VIRAL at this time  I would recommend starting Flonase and antihistamine to help with sinus pressure and congestion  Neti pot and nasal saline flushes can be helpful  Patient is insistent that this is a sinus infection as she has had them before  Discussed with patient I will give her a script to hang onto and she should only start antibiotic if in the next 48 hours symptoms are not improving she develops fevers  Recommended that she establish care with new family doctor  Offer to call the care now provider line to help set up appointment or give information for family doctor but she declined  Go to the ER with worsening symptoms        Chief Complaint     Chief Complaint   Patient presents with    Cough     Sinus pressure more toward the right side , hurt to breath, mucus color green and running nose since yesterday   Headache    Dizziness         History of Present Illness   Zbigniew Del Cid presents to the clinic c/o    This is a 44year old female here today with cough, congestion, rhinorrhea, sinus pressure for 1 day  No fevers but has had chills  No body aches  No ear pain  She has some pressure in right ear and head  She also complains of sore throat  No chest pain, sob  No nausea or vomiting  She is using Mucinex OTC with some mild relief  Patient very insistent that these are symptoms of her typical sinus infections  She states she gets about 3-4 sinus infections a year  This would be her for sinus infection for this winter  Review of Systems   Review of Systems   Constitutional: Negative  HENT: Positive for congestion, sinus pain, sinus pressure and sore throat  Respiratory: Positive for cough  Cardiovascular: Negative  Psychiatric/Behavioral: Negative  Current Medications     Long-Term Prescriptions   Medication Sig Dispense Refill    levothyroxine 50 mcg tablet Take 50 mcg by mouth daily      ondansetron (ZOFRAN-ODT) 8 mg disintegrating tablet Take 1 tablet (8 mg total) by mouth every 8 (eight) hours as needed for nausea or vomiting (Patient not taking: Reported on 1/28/2019 ) 20 tablet 0       Current Allergies     Allergies as of 01/28/2019    (No Known Allergies)            The following portions of the patient's history were reviewed and updated as appropriate: allergies, current medications, past family history, past medical history, past social history, past surgical history and problem list     Objective   /73   Pulse 91   Temp 97 5 °F (36 4 °C) (Temporal)   Resp 18   Ht 5' 2" (1 575 m)   Wt 102 kg (224 lb)   LMP 01/28/2019   SpO2 97%   BMI 40 97 kg/m²        Physical Exam     Physical Exam   Constitutional: She is oriented to person, place, and time  She appears well-developed and well-nourished     HENT:   Head: Normocephalic  Right Ear: External ear normal    Left Ear: External ear normal    Mouth/Throat: Oropharynx is clear and moist    Significant tenderness to palpate over the maxillary sinus on the right side  Neck: Normal range of motion  Neck supple  Pulmonary/Chest: Effort normal and breath sounds normal  No respiratory distress  She has no wheezes  She has no rales  She exhibits no tenderness  Neurological: She is alert and oriented to person, place, and time  Psychiatric: She has a normal mood and affect  Her behavior is normal    Nursing note and vitals reviewed

## 2019-03-06 ENCOUNTER — OFFICE VISIT (OUTPATIENT)
Dept: FAMILY MEDICINE CLINIC | Facility: CLINIC | Age: 39
End: 2019-03-06
Payer: COMMERCIAL

## 2019-03-06 VITALS
DIASTOLIC BLOOD PRESSURE: 80 MMHG | SYSTOLIC BLOOD PRESSURE: 130 MMHG | WEIGHT: 229.2 LBS | HEIGHT: 62 IN | BODY MASS INDEX: 42.18 KG/M2 | HEART RATE: 80 BPM | RESPIRATION RATE: 16 BRPM

## 2019-03-06 DIAGNOSIS — E66.01 MORBID OBESITY WITH BMI OF 40.0-44.9, ADULT (HCC): ICD-10-CM

## 2019-03-06 DIAGNOSIS — E53.8 VITAMIN B12 DEFICIENCY: ICD-10-CM

## 2019-03-06 DIAGNOSIS — B37.2 CANDIDIASIS, INTERTRIGO: ICD-10-CM

## 2019-03-06 DIAGNOSIS — E03.9 ACQUIRED HYPOTHYROIDISM: Primary | ICD-10-CM

## 2019-03-06 PROCEDURE — 99203 OFFICE O/P NEW LOW 30 MIN: CPT | Performed by: FAMILY MEDICINE

## 2019-03-06 RX ORDER — LEVOTHYROXINE SODIUM 0.05 MG/1
50 TABLET ORAL DAILY
Qty: 30 TABLET | Refills: 1 | Status: SHIPPED | OUTPATIENT
Start: 2019-03-06 | End: 2019-06-06 | Stop reason: SDUPTHER

## 2019-03-06 RX ORDER — NYSTATIN 100000 U/G
OINTMENT TOPICAL 2 TIMES DAILY
Qty: 30 G | Refills: 1 | Status: SHIPPED | OUTPATIENT
Start: 2019-03-06 | End: 2019-06-06 | Stop reason: ALTCHOICE

## 2019-03-07 NOTE — PROGRESS NOTES
Assessment/Plan:  1  Acquired hypothyroidism  - continue Levothyroxine 50 mcg daily, will recheck TSH  - TSH, 3rd generation with Free T4 reflex; Future  - levothyroxine 50 mcg tablet; Take 1 tablet (50 mcg total) by mouth daily  Dispense: 30 tablet; Refill: 1    2  Candidiasis, intertrigo  - keep the site dry, start Nystatin oint twice a day, also discussed using Nystatin powder for prevention  - follow up if not better  - nystatin (MYCOSTATIN) ointment; Apply topically 2 (two) times a day tTo affected areas  Dispense: 30 g; Refill: 1    3  Vitamin B12 deficiency  - will recheck vitamin B12  - Vitamin B12; Future    4  Morbid obesity with BMI of 40 0-44 9, adult [facility])  - continue life style changes    Follow up in July or August for annual physical or sooner as needed  Possible side effects of new medications were reviewed with the patient today  The treatment plan was reviewed with the patient  The patient understands and agrees with the treatment plan      Subjective:   Chief Complaint   Patient presents with    Np to be established    Medication Refill      Patient ID: Gretchen Watters is a 44 y o  female who presents today for her initial visit as a new patient to be established  Patient reports history of hypothyroidism and has been on Levothyroxine 50 mcg daily for over 5 years  She also reports hx of vitamin B12 deficiency and used to get monthly B12 injections  Patient states she lost over 50 lbs since June by improving her diet, she also joined a gym and started exercising regularly  Patient reports recurrent erythematous rash under her abdominal skin fold in the past few months, she has been trying to keep the area dry, but has not tried any OTC creams       The following portions of the patient's history were reviewed and updated as appropriate: allergies, current medications, past family history, past medical history, past social history, past surgical history and problem list     Past Medical History:   Diagnosis Date    Disease of thyroid gland     Hypothyroidism      Past Surgical History:   Procedure Laterality Date     SECTION      CHOLECYSTECTOMY      DENTAL SURGERY      [place] teeth extraction     Family History   Problem Relation Age of Onset   [facility] COPD Mother     Heart disease Mother    [facility] HIV Father     Alcohol abuse Father     Substance Abuse Father     Seizures Father     Seizures Sister     Bipolar disorder Brother     Seizures Daughter     Hypothyroidism Paternal Grandmother      Social History     Socioeconomic History    Marital status: Single     Spouse name: Not on file    Number of children: Not on file    Years of education: Not on file    Highest education level: Not on file   Occupational History    Not on file   Social Needs    Financial resource strain: Not on file    Food insecurity:     Worry: Not on file     Inability: Not on file    Transportation needs:     Medical: Not on file     Non-medical: Not on file   Tobacco Use    Smoking status: Never Smoker    Smokeless tobacco: Never Used   Substance and Sexual Activity    Alcohol use: No    Drug use: No    Sexual activity: Yes     Partners: Male   Lifestyle    Physical activity:     Days per week: Not on file     Minutes per session: Not on file    Stress: Not on file   Relationships    Social connections:     Talks on phone: Not on file     Gets together: Not on file     Attends Denominational service: Not on file     Active member of club or organization: Not on file     Attends meetings of clubs or organizations: Not on file     Relationship status: Not on file    Intimate partner violence:     Fear of current or ex partner: Not on file     Emotionally abused: Not on file     Physically abused: Not on file     Forced sexual activity: Not on file   Other Topics Concern    Not on file   Social History Narrative    Not on file       Current Outpatient Medications:     albuterol (PROVENTIL HFA,VENTOLIN HFA) 90 mcg/act inhaler, Inhale 2 puffs every 4 (four) hours as needed for wheezing, Disp: 1 Inhaler, Rfl: 0    levothyroxine 50 mcg tablet, Take 1 tablet (50 mcg total) by mouth daily, Disp: 30 tablet, Rfl: 1    nystatin (MYCOSTATIN) ointment, Apply topically 2 (two) times a day tTo affected areas, Disp: 30 g, Rfl: 1    Review of Systems   Constitutional: Negative for appetite change, chills, fatigue, fever and unexpected weight change  HENT: Negative for congestion, rhinorrhea, sore throat and trouble swallowing  Eyes: Negative for pain, discharge, redness, itching and visual disturbance  Respiratory: Negative for cough, shortness of breath and wheezing  Cardiovascular: Negative for chest pain, palpitations and leg swelling  Gastrointestinal: Negative for abdominal pain, blood in stool, constipation, diarrhea, nausea and vomiting  Endocrine: Negative for cold intolerance, heat intolerance, polydipsia, polyphagia and polyuria  Genitourinary: Negative for difficulty urinating, dysuria, flank pain, frequency, hematuria, pelvic pain and urgency  Musculoskeletal: Negative for arthralgias, joint swelling and myalgias  Skin: Positive for rash  Neurological: Negative for dizziness, syncope, weakness, numbness and headaches  Hematological: Negative for adenopathy  Psychiatric/Behavioral: Negative for dysphoric mood and sleep disturbance  The patient is not nervous/anxious  Objective:    Vitals:    03/06/19 1350   BP: 130/80   BP Location: Left arm   Patient Position: Sitting   Cuff Size: Large   Pulse: 80   Resp: 16   Weight: 104 kg (229 lb 3 2 oz)   Height: 5' 2" (1 575 m)        Physical Exam   Constitutional: She is oriented to person, place, and time  She appears well-developed and well-nourished  No distress  HENT:   Head: Normocephalic and atraumatic     Right Ear: Tympanic membrane and ear canal normal    Left Ear: Tympanic membrane and ear canal normal    Nose: Nose normal    Mouth/Throat: Oropharynx is clear and moist    Eyes: Pupils are equal, round, and reactive to light  Conjunctivae and EOM are normal  No scleral icterus  Neck: Neck supple  No thyromegaly present  Cardiovascular: Normal rate, regular rhythm and normal heart sounds  No murmur heard  Pulmonary/Chest: Effort normal  No respiratory distress  She has no wheezes  She has no rhonchi  She has no rales  Abdominal: Soft  She exhibits no distension and no mass  There is no tenderness  Musculoskeletal: She exhibits no edema  Lymphadenopathy:     She has no cervical adenopathy  Neurological: She is alert and oriented to person, place, and time  No cranial nerve deficit  Skin:   Right side lower abdomen under skin fold large area of erythema with slight scaling on edges and satellite papules   Psychiatric: She has a normal mood and affect  Her behavior is normal  Thought content normal          Lab Results   Component Value Date    HDL 36 (L) 08/27/2018     Lab Results   Component Value Date    LDLCALC 127 08/27/2018     Lab Results   Component Value Date    TRIG 136 08/27/2018     Lab Results   Component Value Date    HGBA1C 5 7 08/27/2018       BMI Counseling: Body mass index is 41 92 kg/m²  Discussed the patient's BMI with her  The BMI is above average  BMI counseling and education was provided to the patient  Nutrition recommendations include reducing portion sizes, decreasing overall calorie intake, consuming healthier snacks, moderation in carbohydrate intake and reducing intake of saturated fat and trans fat  Exercise recommendations include moderate aerobic physical activity for 150 minutes/week and strength training exercises

## 2019-03-12 ENCOUNTER — APPOINTMENT (EMERGENCY)
Dept: RADIOLOGY | Facility: HOSPITAL | Age: 39
End: 2019-03-12
Payer: COMMERCIAL

## 2019-03-12 ENCOUNTER — HOSPITAL ENCOUNTER (EMERGENCY)
Facility: HOSPITAL | Age: 39
Discharge: HOME | End: 2019-03-13
Attending: EMERGENCY MEDICINE | Admitting: EMERGENCY MEDICINE
Payer: COMMERCIAL

## 2019-03-12 VITALS
HEART RATE: 110 BPM | OXYGEN SATURATION: 96 % | RESPIRATION RATE: 22 BRPM | DIASTOLIC BLOOD PRESSURE: 76 MMHG | SYSTOLIC BLOOD PRESSURE: 161 MMHG | WEIGHT: 229 LBS | BODY MASS INDEX: 41.88 KG/M2 | TEMPERATURE: 97.9 F

## 2019-03-12 DIAGNOSIS — J40 BRONCHITIS: ICD-10-CM

## 2019-03-12 DIAGNOSIS — J32.9 SINUSITIS: Primary | ICD-10-CM

## 2019-03-12 PROCEDURE — 99283 EMERGENCY DEPT VISIT LOW MDM: CPT

## 2019-03-12 RX ORDER — AMOXICILLIN AND CLAVULANATE POTASSIUM 875; 125 MG/1; MG/1
1 TABLET, FILM COATED ORAL EVERY 12 HOURS
Qty: 14 TABLET | Refills: 0 | Status: SHIPPED | OUTPATIENT
Start: 2019-03-12 | End: 2019-03-19

## 2019-03-12 RX ORDER — AMOXICILLIN AND CLAVULANATE POTASSIUM 875; 125 MG/1; MG/1
1 TABLET, FILM COATED ORAL ONCE
Status: DISCONTINUED | OUTPATIENT
Start: 2019-03-13 | End: 2019-03-13

## 2019-03-13 RX ORDER — AMOXICILLIN AND CLAVULANATE POTASSIUM 875; 125 MG/1; MG/1
1 TABLET, FILM COATED ORAL ONCE
Status: COMPLETED | OUTPATIENT
Start: 2019-03-13 | End: 2019-03-13

## 2019-03-13 RX ADMIN — AMOXICILLIN AND CLAVULANATE POTASSIUM 1 TABLET: 875; 125 TABLET, FILM COATED ORAL at 00:07

## 2019-03-14 ENCOUNTER — OFFICE VISIT (OUTPATIENT)
Dept: FAMILY MEDICINE CLINIC | Facility: CLINIC | Age: 39
End: 2019-03-14
Payer: COMMERCIAL

## 2019-03-14 VITALS
RESPIRATION RATE: 20 BRPM | SYSTOLIC BLOOD PRESSURE: 154 MMHG | BODY MASS INDEX: 41.04 KG/M2 | DIASTOLIC BLOOD PRESSURE: 68 MMHG | HEART RATE: 88 BPM | WEIGHT: 223 LBS | HEIGHT: 62 IN

## 2019-03-14 DIAGNOSIS — R05.9 COUGH: Primary | ICD-10-CM

## 2019-03-14 PROCEDURE — 1036F TOBACCO NON-USER: CPT | Performed by: NURSE PRACTITIONER

## 2019-03-14 PROCEDURE — 99213 OFFICE O/P EST LOW 20 MIN: CPT | Performed by: NURSE PRACTITIONER

## 2019-03-14 PROCEDURE — 3008F BODY MASS INDEX DOCD: CPT | Performed by: NURSE PRACTITIONER

## 2019-03-14 RX ORDER — ALBUTEROL SULFATE 2.5 MG/3ML
2.5 SOLUTION RESPIRATORY (INHALATION) EVERY 6 HOURS PRN
Qty: 25 VIAL | Refills: 4 | Status: SHIPPED | OUTPATIENT
Start: 2019-03-14 | End: 2020-09-23 | Stop reason: SDUPTHER

## 2019-03-14 RX ORDER — PREDNISONE 10 MG/1
TABLET ORAL
Qty: 30 TABLET | Refills: 0 | Status: SHIPPED | OUTPATIENT
Start: 2019-03-14 | End: 2019-07-16 | Stop reason: ALTCHOICE

## 2019-03-14 NOTE — PROGRESS NOTES
Assessment/Plan:    1  Bronchitis  Continue the MDI and we renewed the albuterol for the neb you have at home  In the meantime we will start prednisone taper  Please finish the antibiotic that was prescribed  Call if you are not getting better  - Peak flow  - predniSONE 10 mg tablet; Take 5,5,4,4,3,3,2,2,1,1  Dispense: 30 tablet; Refill: 0     rto prn     Subjective:      Patient ID: Alexi Petty is a 44 y o  female  Here today with madhuri renzo for er follow up  Started about one week ago with a cough that was not too bothersome at first  Became progressively worse  3/12/2019 went to the er  ( [place]) because feels like the air was getting stuck in her lungs  Cough became worse  While in the er thought that there could be pneumonia  No cxr was done  Was started on augmentin and proventil MDI    Still has the cough that is almost choking and gasping  Was also given a recuse MDI that she is doing every 4-6 hours  Does not feel that this is making a difference  No fevers  Is sweating in the am    Since treated about 48 hours ago is not feeling any improvement  OBJECTIVE  Past Medical History:   Diagnosis Date    Disease of thyroid gland     Hypothyroidism      temporarily  Wt Readings from Last 3 Encounters:   03/14/19 101 kg (223 lb)   03/12/19 104 kg (229 lb)   03/06/19 104 kg (229 lb 3 2 oz)     BP Readings from Last 3 Encounters:   03/14/19 154/68   03/12/19 161/76   03/06/19 130/80     Pulse Readings from Last 3 Encounters:   03/14/19 88   03/12/19 (!) 110   03/06/19 80     BMI Readings from Last 3 Encounters:   03/14/19 40 79 kg/m²   03/12/19 41 88 kg/m²   03/06/19 41 92 kg/m²        Physical Exam   Constitutional: She appears well-developed and well-nourished  HENT:   Tm's clear  turbs open with no discharge  pharynx benign   Neck: Normal range of motion  Neck supple  Cardiovascular: Normal rate and regular rhythm     Pulmonary/Chest: Effort normal    Scattered wheezes through out No rales     Psychiatric: She has a normal mood and affect   Her behavior is normal  Judgment and thought content normal

## 2019-03-14 NOTE — LETTER
March 14, 2019     Patient: Joaquín Cazares   YOB: 1980   Date of Visit: 3/14/2019       To Whom it May Concern:    Joaquín Cazares is under my professional care  She was seen in my office on 3/14/2019  She may return to work on 3/18/2019  If you have any questions or concerns, please don't hesitate to call           Sincerely,          RAFAELA Galeas        CC: No Recipients

## 2019-05-17 ENCOUNTER — APPOINTMENT (OUTPATIENT)
Dept: LAB | Facility: HOSPITAL | Age: 39
End: 2019-05-17
Payer: COMMERCIAL

## 2019-05-17 DIAGNOSIS — E03.9 ACQUIRED HYPOTHYROIDISM: ICD-10-CM

## 2019-05-17 DIAGNOSIS — E53.8 VITAMIN B12 DEFICIENCY: ICD-10-CM

## 2019-05-17 LAB
TSH SERPL DL<=0.05 MIU/L-ACNC: 3.34 UIU/ML (ref 0.47–4.68)
VIT B12 SERPL-MCNC: 540 PG/ML (ref 100–900)

## 2019-05-17 PROCEDURE — 82607 VITAMIN B-12: CPT

## 2019-05-17 PROCEDURE — 84443 ASSAY THYROID STIM HORMONE: CPT

## 2019-05-17 PROCEDURE — 36415 COLL VENOUS BLD VENIPUNCTURE: CPT

## 2019-06-06 ENCOUNTER — TELEPHONE (OUTPATIENT)
Dept: FAMILY MEDICINE CLINIC | Facility: CLINIC | Age: 39
End: 2019-06-06

## 2019-06-06 DIAGNOSIS — B37.2 CANDIDIASIS, INTERTRIGO: Primary | ICD-10-CM

## 2019-06-06 DIAGNOSIS — E03.9 ACQUIRED HYPOTHYROIDISM: ICD-10-CM

## 2019-06-06 RX ORDER — NYSTATIN 100000 [USP'U]/G
POWDER TOPICAL 2 TIMES DAILY
Qty: 60 G | Refills: 1 | Status: SHIPPED | OUTPATIENT
Start: 2019-06-06 | End: 2019-09-22

## 2019-06-06 RX ORDER — LEVOTHYROXINE SODIUM 0.05 MG/1
50 TABLET ORAL DAILY
Qty: 30 TABLET | Refills: 6 | Status: SHIPPED | OUTPATIENT
Start: 2019-06-06 | End: 2019-09-22

## 2019-07-16 ENCOUNTER — APPOINTMENT (OUTPATIENT)
Dept: LAB | Facility: CLINIC | Age: 39
End: 2019-07-16
Payer: COMMERCIAL

## 2019-07-16 ENCOUNTER — OFFICE VISIT (OUTPATIENT)
Dept: FAMILY MEDICINE CLINIC | Facility: CLINIC | Age: 39
End: 2019-07-16
Payer: COMMERCIAL

## 2019-07-16 VITALS
RESPIRATION RATE: 16 BRPM | DIASTOLIC BLOOD PRESSURE: 80 MMHG | HEART RATE: 80 BPM | BODY MASS INDEX: 39.86 KG/M2 | WEIGHT: 216.6 LBS | SYSTOLIC BLOOD PRESSURE: 130 MMHG | HEIGHT: 62 IN

## 2019-07-16 DIAGNOSIS — R73.01 ELEVATED FASTING GLUCOSE: ICD-10-CM

## 2019-07-16 DIAGNOSIS — E66.9 OBESITY, CLASS II, BMI 35-39.9: ICD-10-CM

## 2019-07-16 DIAGNOSIS — E78.5 HYPERLIPIDEMIA, UNSPECIFIED HYPERLIPIDEMIA TYPE: ICD-10-CM

## 2019-07-16 DIAGNOSIS — N92.0 MENORRHAGIA WITH REGULAR CYCLE: ICD-10-CM

## 2019-07-16 DIAGNOSIS — E03.9 ACQUIRED HYPOTHYROIDISM: ICD-10-CM

## 2019-07-16 DIAGNOSIS — Z00.00 ENCOUNTER FOR WELLNESS EXAMINATION IN ADULT: Primary | ICD-10-CM

## 2019-07-16 LAB
CHOLEST SERPL-MCNC: 261 MG/DL (ref 50–200)
GLUCOSE P FAST SERPL-MCNC: 91 MG/DL (ref 65–99)
HDLC SERPL-MCNC: 66 MG/DL (ref 40–60)
LDLC SERPL CALC-MCNC: 177 MG/DL (ref 0–100)
TRIGL SERPL-MCNC: 91 MG/DL

## 2019-07-16 PROCEDURE — 99395 PREV VISIT EST AGE 18-39: CPT | Performed by: FAMILY MEDICINE

## 2019-07-16 PROCEDURE — 36415 COLL VENOUS BLD VENIPUNCTURE: CPT

## 2019-07-16 PROCEDURE — 80061 LIPID PANEL: CPT

## 2019-07-16 PROCEDURE — 82947 ASSAY GLUCOSE BLOOD QUANT: CPT

## 2019-07-16 NOTE — PROGRESS NOTES
Aj Montgomery is a 44 y o   female and is here for routine health maintenance  History of Present Illness     Well Adult Physical   Patient here for a comprehensive physical exam   Patient started exercising regularly and has been following a low carb diet, avoiding sugary drinks and unhealthy snacks and lost 105 lbs since 2018  Diet and Physical Activity  Diet: well balanced diet  Weight concerns: Patient has class 2 obesity (BMI 35 0-39  9)  Exercise: frequently      Depression Screen  PHQ-9 Depression Screening    PHQ-9:    Frequency of the following problems over the past two weeks:       Little interest or pleasure in doing things:  0 - not at all  Feeling down, depressed, or hopeless:  0 - not at all  PHQ-2 Score:  0     General Health  Hearing: Normal:  bilateral  Vision: no vision problems  Dental: regular dental visits     History:  LMP: 19, periods have been regular but heavy in the past several months and lasting for 4-5 days  : 4  Para: 3    Cancer Screening  Mammogram: indicated in 2020  Pap: last pap smear 3 years ago negative with prior Gyn in [place]    The following portions of the patient's history were reviewed and updated as appropriate: allergies, current medications, past family history, past medical history, past social history, past surgical history and problem list     Review of Systems     Review of Systems   Constitutional: Negative for appetite change, chills, fatigue, fever and unexpected weight change  HENT: Negative for congestion, ear pain, nosebleeds, rhinorrhea, sore throat and trouble swallowing  Eyes: Negative for photophobia, pain, discharge, redness, itching and visual disturbance  Respiratory: Negative for cough, shortness of breath and wheezing  Cardiovascular: Negative for chest pain, palpitations and leg swelling  Gastrointestinal: Negative for abdominal pain, blood in stool, constipation, diarrhea, nausea and vomiting  Endocrine: Negative for cold intolerance, heat intolerance, polydipsia, polyphagia and polyuria  Genitourinary: Negative for dysuria, frequency, hematuria, pelvic pain, urgency, vaginal bleeding and vaginal discharge  Heavy periods   Musculoskeletal: Negative for arthralgias, back pain, gait problem, joint swelling and myalgias  Skin: Negative for rash  Neurological: Negative for dizziness, syncope, weakness, numbness and headaches  Hematological: Negative for adenopathy  Does not bruise/bleed easily  Psychiatric/Behavioral: Negative for dysphoric mood and sleep disturbance  The patient is not nervous/anxious          Past Medical History     Past Medical History:   Diagnosis Date    Disease of thyroid gland     Hypothyroidism        Past Surgical History     Past Surgical History:   Procedure Laterality Date     SECTION      CHOLECYSTECTOMY      DENTAL SURGERY      [place] teeth extraction       Social History     Social History     Socioeconomic History    Marital status: Single     Spouse name: None    Number of children: None    Years of education: None    Highest education level: None   Occupational History    None   Social Needs    Financial resource strain: None    Food insecurity:     Worry: None     Inability: None    Transportation needs:     Medical: None     Non-medical: None   Tobacco Use    Smoking status: Never Smoker    Smokeless tobacco: Never Used   Substance and Sexual Activity    Alcohol use: Yes     Comment: Rare    Drug use: No    Sexual activity: Yes     Partners: Male   Lifestyle    Physical activity:     Days per week: None     Minutes per session: None    Stress: None   Relationships    Social connections:     Talks on phone: None     Gets together: None     Attends Yazdanism service: None     Active member of club or organization: None     Attends meetings of clubs or organizations: None     Relationship status: None    Intimate partner violence:     Fear of current or ex partner: None     Emotionally abused: None     Physically abused: None     Forced sexual activity: None   Other Topics Concern    None   Social History Narrative    None       Family History     Family History   Problem Relation Age of Onset    COPD Mother     Heart disease Mother    Tasha Hernandez HIV Father     Alcohol abuse Father     Substance Abuse Father     Seizures Father     Seizures Sister     Post-traumatic stress disorder Sister     Bipolar disorder Brother     Seizures Daughter     Hypothyroidism Paternal Grandmother     Breast cancer Maternal Aunt        Current Medications       Current Outpatient Medications:     albuterol (2 5 mg/3 mL) 0 083 % nebulizer solution, Take 1 vial (2 5 mg total) by nebulization every 6 (six) hours as needed for wheezing or shortness of breath, Disp: 25 vial, Rfl: 4    albuterol (PROVENTIL HFA,VENTOLIN HFA) 90 mcg/act inhaler, Inhale 2 puffs every 4 (four) hours as needed for wheezing, Disp: 1 Inhaler, Rfl: 0    levothyroxine 50 mcg tablet, Take 1 tablet (50 mcg total) by mouth daily, Disp: 30 tablet, Rfl: 6    nystatin (MYCOSTATIN) powder, Apply topically 2 (two) times a day (Patient taking differently: Apply topically as needed ), Disp: 60 g, Rfl: 1     Allergies     Allergies   Allergen Reactions    Other Allergic Rhinitis     Seasonal  Grass       Objective     /80 (BP Location: Left arm, Patient Position: Sitting, Cuff Size: Adult)   Pulse 80   Resp 16   Ht 5' 2" (1 575 m)   Wt 98 2 kg (216 lb 9 6 oz)   BMI 39 62 kg/m²   Wt Readings from Last 3 Encounters:   07/16/19 98 2 kg (216 lb 9 6 oz)   03/14/19 101 kg (223 lb)   03/12/19 104 kg (229 lb)     BP Readings from Last 3 Encounters:   07/16/19 130/80   03/14/19 154/68   03/12/19 161/76     Pulse Readings from Last 3 Encounters:   07/16/19 80   03/14/19 88   03/12/19 (!) 110     Body mass index is 39 62 kg/m²       Physical Exam   Constitutional: She is oriented to person, place, and time  She appears well-developed and well-nourished  HENT:   Head: Normocephalic and atraumatic  Right Ear: Tympanic membrane, external ear and ear canal normal    Left Ear: Tympanic membrane, external ear and ear canal normal    Nose: Nose normal    Mouth/Throat: Oropharynx is clear and moist    Eyes: Pupils are equal, round, and reactive to light  Conjunctivae and EOM are normal  No scleral icterus  Neck: Neck supple  No thyromegaly present  Cardiovascular: Normal rate, regular rhythm and normal heart sounds  No murmur heard  Pulmonary/Chest: Effort normal and breath sounds normal  No respiratory distress  She has no wheezes  She has no rales  Abdominal: Soft  Bowel sounds are normal  She exhibits no distension and no mass  There is no tenderness  Musculoskeletal: She exhibits no edema  Lymphadenopathy:     She has no cervical adenopathy  Neurological: She is alert and oriented to person, place, and time  She has normal reflexes  No cranial nerve deficit  Skin: Skin is warm and dry  No rash noted  Psychiatric: She has a normal mood and affect   Her behavior is normal  Judgment and thought content normal          Health Maintenance     Health Maintenance   Topic Date Due    DTaP,Tdap,and Td Vaccines (1 - Tdap) 01/23/2001    PAP SMEAR  01/23/2001    INFLUENZA VACCINE  07/01/2019    Depression Screening PHQ  01/28/2020    BMI: Followup Plan  03/06/2020    BMI: Adult  03/14/2020    Pneumococcal Vaccine: 65+ Years (1 of 2 - PCV13) 01/23/2045    Pneumococcal Vaccine: Pediatrics (0 to 5 Years) and At-Risk Patients (6 to 59 Years)  Aged Out    HEPATITIS B VACCINES  Aged [employer] History   Administered Date(s) Administered    INFLUENZA 11/13/2018       Laboratory Results:   Lab Results   Component Value Date    WBC 7 60 01/02/2019    HGB 13 3 01/02/2019    HCT 42 8 01/02/2019    MCV 76 (L) 01/02/2019     01/02/2019     Lab Results   Component Value Date BUN 14 01/02/2019     Lab Results   Component Value Date    GLUC 100 (H) 01/02/2019    ALT 40 01/02/2019    AST 28 01/02/2019     Lipid Profile:   Lab Results   Component Value Date    HDL 36 (L) 08/27/2018     Lab Results   Component Value Date    LDLCALC 127 08/27/2018     Lab Results   Component Value Date    TRIG 136 08/27/2018     Lab Results   Component Value Date    NOT4QUUJPHSA 3 340 05/17/2019       Assessment/Plan     1  Encounter for wellness examination in adult  - Lipid Panel with Direct LDL reflex; Future  - Glucose, fasting; Future    2  Acquired hypothyroidism  - continue Levothyroxine 50 mcg daily, we will recheck TSH in 6 months  - TSH, 3rd generation with Free T4 reflex; Future    3  Obesity, Class II with BMI 39 0-39 9,adult  - continue life style changes    4  Menorrhagia with regular cycle  - we will refer to [facility] for evaluation    Patient Counseling:   · Nutrition: Stressed importance of a well balanced diet, moderation of sodium/saturated fat, caloric balance and sufficient intake of fiber  · Exercise: Stressed the importance of regular exercise with a goal of 150 minutes per week  · Dental Health: Discussed daily flossing and brushing and regular dental visits   · Injury Prevention: Discussed Safety Belts, Safety Helmets, and Smoke Detectors  · Immunizations reviewed, last Tdap 6 years ago after her daughter's birth, we will obtain records  · Discussed benefits of cervical cancer screening and annual Gyn exam  · Discussed the patient's BMI with her  The BMI is above average; BMI management plan is completed    Follow up next physical in 1 year      Dc Ye MD

## 2019-07-19 ENCOUNTER — TELEPHONE (OUTPATIENT)
Dept: FAMILY MEDICINE CLINIC | Facility: CLINIC | Age: 39
End: 2019-07-19

## 2019-07-19 NOTE — TELEPHONE ENCOUNTER
Please call patient, her sugar was normal, but her cholesterol was elevated 261 ( normal < 200)  I recommend for patient to avoid fried/ fatty foods, reduce red meat and shell fish intake  I recommend rechecking her cholesterol in 6 months

## 2019-07-20 ENCOUNTER — TELEPHONE (OUTPATIENT)
Dept: FAMILY MEDICINE CLINIC | Facility: CLINIC | Age: 39
End: 2019-07-20

## 2019-07-21 ENCOUNTER — APPOINTMENT (EMERGENCY)
Dept: RADIOLOGY | Facility: HOSPITAL | Age: 39
End: 2019-07-21
Payer: COMMERCIAL

## 2019-07-21 ENCOUNTER — HOSPITAL ENCOUNTER (EMERGENCY)
Facility: HOSPITAL | Age: 39
Discharge: HOME | End: 2019-07-21
Attending: EMERGENCY MEDICINE
Payer: COMMERCIAL

## 2019-07-21 VITALS
TEMPERATURE: 97.9 F | OXYGEN SATURATION: 96 % | DIASTOLIC BLOOD PRESSURE: 67 MMHG | RESPIRATION RATE: 20 BRPM | BODY MASS INDEX: 39.86 KG/M2 | SYSTOLIC BLOOD PRESSURE: 107 MMHG | HEART RATE: 92 BPM | WEIGHT: 216.6 LBS | HEIGHT: 62 IN

## 2019-07-21 DIAGNOSIS — R11.2 NAUSEA AND VOMITING: ICD-10-CM

## 2019-07-21 DIAGNOSIS — R19.7 DIARRHEA, UNSPECIFIED TYPE: ICD-10-CM

## 2019-07-21 DIAGNOSIS — K52.9 COLITIS: Primary | ICD-10-CM

## 2019-07-21 DIAGNOSIS — R10.13 EPIGASTRIC PAIN: ICD-10-CM

## 2019-07-21 LAB
ALBUMIN SERPL BCP-MCNC: 3.4 G/DL (ref 3.5–5)
ALP SERPL-CCNC: 102 U/L (ref 46–116)
ALT SERPL W P-5'-P-CCNC: 59 U/L (ref 12–78)
ANION GAP SERPL CALCULATED.3IONS-SCNC: 7 MMOL/L (ref 4–13)
AST SERPL W P-5'-P-CCNC: 53 U/L (ref 5–45)
BASOPHILS # BLD AUTO: 0.01 THOUSANDS/ΜL (ref 0–0.1)
BASOPHILS NFR BLD AUTO: 0 % (ref 0–1)
BILIRUB SERPL-MCNC: 0.46 MG/DL (ref 0.2–1)
BUN SERPL-MCNC: 17 MG/DL (ref 5–25)
CALCIUM SERPL-MCNC: 9 MG/DL (ref 8.3–10.1)
CHLORIDE SERPL-SCNC: 112 MMOL/L (ref 100–108)
CO2 SERPL-SCNC: 23 MMOL/L (ref 21–32)
CREAT SERPL-MCNC: 0.69 MG/DL (ref 0.6–1.3)
EOSINOPHIL # BLD AUTO: 0.17 THOUSAND/ΜL (ref 0–0.61)
EOSINOPHIL NFR BLD AUTO: 2 % (ref 0–6)
ERYTHROCYTE [DISTWIDTH] IN BLOOD BY AUTOMATED COUNT: 15.2 % (ref 11.6–15.1)
EXT PREG TEST URINE: NEGATIVE
EXT. CONTROL ED NAV: NORMAL
GFR SERPL CREATININE-BSD FRML MDRD: 110 ML/MIN/1.73SQ M
GLUCOSE SERPL-MCNC: 103 MG/DL (ref 65–140)
HCT VFR BLD AUTO: 39.3 % (ref 34.8–46.1)
HGB BLD-MCNC: 12.2 G/DL (ref 11.5–15.4)
IMM GRANULOCYTES # BLD AUTO: 0.03 THOUSAND/UL (ref 0–0.2)
IMM GRANULOCYTES NFR BLD AUTO: 0 % (ref 0–2)
LIPASE SERPL-CCNC: 128 U/L (ref 73–393)
LYMPHOCYTES # BLD AUTO: 0.69 THOUSANDS/ΜL (ref 0.6–4.47)
LYMPHOCYTES NFR BLD AUTO: 6 % (ref 14–44)
MCH RBC QN AUTO: 24.6 PG (ref 26.8–34.3)
MCHC RBC AUTO-ENTMCNC: 31 G/DL (ref 31.4–37.4)
MCV RBC AUTO: 79 FL (ref 82–98)
MONOCYTES # BLD AUTO: 0.56 THOUSAND/ΜL (ref 0.17–1.22)
MONOCYTES NFR BLD AUTO: 5 % (ref 4–12)
NEUTROPHILS # BLD AUTO: 9.24 THOUSANDS/ΜL (ref 1.85–7.62)
NEUTS SEG NFR BLD AUTO: 87 % (ref 43–75)
NRBC BLD AUTO-RTO: 0 /100 WBCS
PLATELET # BLD AUTO: 230 THOUSANDS/UL (ref 149–390)
PMV BLD AUTO: 11.2 FL (ref 8.9–12.7)
POTASSIUM SERPL-SCNC: 3.7 MMOL/L (ref 3.5–5.3)
PROT SERPL-MCNC: 7.6 G/DL (ref 6.4–8.2)
RBC # BLD AUTO: 4.96 MILLION/UL (ref 3.81–5.12)
SODIUM SERPL-SCNC: 142 MMOL/L (ref 136–145)
WBC # BLD AUTO: 10.7 THOUSAND/UL (ref 4.31–10.16)

## 2019-07-21 PROCEDURE — 81025 URINE PREGNANCY TEST: CPT | Performed by: EMERGENCY MEDICINE

## 2019-07-21 PROCEDURE — 85025 COMPLETE CBC W/AUTO DIFF WBC: CPT | Performed by: EMERGENCY MEDICINE

## 2019-07-21 PROCEDURE — 36415 COLL VENOUS BLD VENIPUNCTURE: CPT | Performed by: EMERGENCY MEDICINE

## 2019-07-21 PROCEDURE — 99284 EMERGENCY DEPT VISIT MOD MDM: CPT | Performed by: EMERGENCY MEDICINE

## 2019-07-21 PROCEDURE — 99284 EMERGENCY DEPT VISIT MOD MDM: CPT

## 2019-07-21 PROCEDURE — 96374 THER/PROPH/DIAG INJ IV PUSH: CPT

## 2019-07-21 PROCEDURE — 80053 COMPREHEN METABOLIC PANEL: CPT | Performed by: EMERGENCY MEDICINE

## 2019-07-21 PROCEDURE — 74177 CT ABD & PELVIS W/CONTRAST: CPT

## 2019-07-21 PROCEDURE — 83690 ASSAY OF LIPASE: CPT | Performed by: EMERGENCY MEDICINE

## 2019-07-21 RX ORDER — ONDANSETRON 4 MG/1
4 TABLET, ORALLY DISINTEGRATING ORAL EVERY 8 HOURS SCHEDULED
Qty: 9 TABLET | Refills: 0 | Status: SHIPPED | OUTPATIENT
Start: 2019-07-21 | End: 2019-10-18

## 2019-07-21 RX ORDER — LIDOCAINE HYDROCHLORIDE 20 MG/ML
15 SOLUTION OROPHARYNGEAL ONCE
Status: COMPLETED | OUTPATIENT
Start: 2019-07-21 | End: 2019-07-21

## 2019-07-21 RX ORDER — MAGNESIUM HYDROXIDE/ALUMINUM HYDROXICE/SIMETHICONE 120; 1200; 1200 MG/30ML; MG/30ML; MG/30ML
30 SUSPENSION ORAL ONCE
Status: COMPLETED | OUTPATIENT
Start: 2019-07-21 | End: 2019-07-21

## 2019-07-21 RX ORDER — ONDANSETRON 2 MG/ML
4 INJECTION INTRAMUSCULAR; INTRAVENOUS ONCE
Status: COMPLETED | OUTPATIENT
Start: 2019-07-21 | End: 2019-07-21

## 2019-07-21 RX ADMIN — ALUMINUM HYDROXIDE, MAGNESIUM HYDROXIDE, AND SIMETHICONE 30 ML: 200; 200; 20 SUSPENSION ORAL at 01:17

## 2019-07-21 RX ADMIN — LIDOCAINE HYDROCHLORIDE 15 ML: 20 SOLUTION ORAL; TOPICAL at 01:18

## 2019-07-21 RX ADMIN — ONDANSETRON 4 MG: 2 INJECTION INTRAMUSCULAR; INTRAVENOUS at 01:17

## 2019-07-21 RX ADMIN — IOHEXOL 100 ML: 350 INJECTION, SOLUTION INTRAVENOUS at 03:00

## 2019-07-21 NOTE — ED PROVIDER NOTES
History  Chief Complaint   Patient presents with    Abdominal Pain     Pt "I came home from work, ate some chicken and went to sleep  I woke up earlier today I started having some diarrhea  I had a couple of bouts of it and then I went back to sleep  Then when I woke back up again I have this sever abd pain (pt points to umbilical area) and I feel like I am going to throw up" Pt had two bouts of vomiting     Diarrhea     59-year-old female prior surgical history of cholecystectomy presents to ED stating that she got home today from work at 7:00 a m  Ate some chicken went to bed, states she worked up at 12:30 p m  With some lower abdominal cramping and diarrhea  She states that time she had some chicken broth back to bed  Woke up approximately 1 5 hours prior to arrival with intense enough 10 epigastric pain with 2 episodes of vomiting and nauseousness  So she came to the ED  Patient denies fevers, night sweats, chills, headache, dizziness, lightheadedness, sore throat, cough, chest pain, dysuria, hematuria  Patient states last menstrual period was June 25th  Denies pain like this in the past       History provided by:  Patient   used: No        Prior to Admission Medications   Prescriptions Last Dose Informant Patient Reported? Taking?    albuterol (2 5 mg/3 mL) 0 083 % nebulizer solution More than a month at Unknown time  No No   Sig: Take 1 vial (2 5 mg total) by nebulization every 6 (six) hours as needed for wheezing or shortness of breath   albuterol (PROVENTIL HFA,VENTOLIN HFA) 90 mcg/act inhaler More than a month at Unknown time  No No   Sig: Inhale 2 puffs every 4 (four) hours as needed for wheezing   levothyroxine 50 mcg tablet   No Yes   Sig: Take 1 tablet (50 mcg total) by mouth daily   nystatin (MYCOSTATIN) powder Not Taking at Unknown time  No No   Sig: Apply topically 2 (two) times a day   Patient not taking: Reported on 7/21/2019      Facility-Administered Medications: None Past Medical History:   Diagnosis Date    Disease of thyroid gland     Hypothyroidism        Past Surgical History:   Procedure Laterality Date     SECTION      CHOLECYSTECTOMY      DENTAL SURGERY      [place] teeth extraction       Family History   Problem Relation Age of Onset   Rose Silence COPD Mother     Heart disease Mother    Rose Silence HIV Father     Alcohol abuse Father     Substance Abuse Father     Seizures Father     Seizures Sister     Post-traumatic stress disorder Sister     Bipolar disorder Brother     Seizures Daughter     Hypothyroidism Paternal Grandmother     Breast cancer Maternal Aunt      I have reviewed and agree with the history as documented  Social History     Tobacco Use    Smoking status: Never Smoker    Smokeless tobacco: Never Used   Substance Use Topics    Alcohol use: Yes     Comment: Rare    Drug use: No        Review of Systems   Constitutional: Negative for chills, diaphoresis, fatigue and fever  HENT: Negative for congestion, nosebleeds, postnasal drip, rhinorrhea, sinus pressure, sinus pain, sneezing and sore throat  Respiratory: Negative for apnea, cough, chest tightness, shortness of breath, wheezing and stridor  Cardiovascular: Negative for chest pain and palpitations  Gastrointestinal: Positive for abdominal pain, diarrhea, nausea and vomiting  Negative for abdominal distention, blood in stool and constipation  Genitourinary: Negative for difficulty urinating, dysuria, flank pain, frequency, hematuria and urgency  Musculoskeletal: Negative for arthralgias, back pain, gait problem, joint swelling, myalgias, neck pain and neck stiffness  Skin: Negative for color change, pallor, rash and wound  Neurological: Negative for dizziness, light-headedness, numbness and headaches  Psychiatric/Behavioral: Negative for confusion and decreased concentration  The patient is not nervous/anxious          Physical Exam  ED Triage Vitals [19 0044] Temperature Pulse Respirations Blood Pressure SpO2   97 9 °F (36 6 °C) (!) 119 20 115/82 100 %      Temp Source Heart Rate Source Patient Position - Orthostatic VS BP Location FiO2 (%)   Oral Monitor Sitting Left arm --      Pain Score       8             Orthostatic Vital Signs  Vitals:    07/21/19 0115 07/21/19 0235 07/21/19 0330 07/21/19 0400   BP: 125/65 109/63 (!) 95/48 107/67   Pulse: (!) 109 95 92 92   Patient Position - Orthostatic VS: Lying Lying Lying Lying       Physical Exam   Constitutional: She is oriented to person, place, and time  She appears well-developed and well-nourished  Non-toxic appearance  She does not appear ill  No distress  HENT:   Head: Normocephalic and atraumatic  Right Ear: External ear normal    Left Ear: External ear normal    Nose: Nose normal    Mouth/Throat: Oropharynx is clear and moist  No oropharyngeal exudate  Eyes: Pupils are equal, round, and reactive to light  Conjunctivae and EOM are normal  Right eye exhibits no discharge  Left eye exhibits no discharge  No scleral icterus  Neck: Normal range of motion  Neck supple  No JVD present  No tracheal deviation present  Cardiovascular: Normal rate, regular rhythm, normal heart sounds and intact distal pulses  Exam reveals no gallop and no friction rub  No murmur heard  Pulmonary/Chest: Effort normal and breath sounds normal  No stridor  No respiratory distress  She has no wheezes  She has no rales  Abdominal: Soft  Normal appearance and bowel sounds are normal  She exhibits no distension and no mass  There is tenderness in the right upper quadrant and epigastric area  There is no rigidity, no rebound, no guarding, no CVA tenderness, no tenderness at McBurney's point and negative Ramirez's sign  No hernia  Musculoskeletal: Normal range of motion  She exhibits no edema, tenderness or deformity  Neurological: She is alert and oriented to person, place, and time  No cranial nerve deficit or sensory deficit   She exhibits normal muscle tone  Skin: Skin is warm and dry  No rash noted  She is not diaphoretic  No erythema  No pallor  Psychiatric: She has a normal mood and affect  Her behavior is normal  Judgment and thought content normal    Nursing note and vitals reviewed        ED Medications  Medications   Lidocaine Viscous HCl (XYLOCAINE) 2 % mucosal solution 15 mL (15 mL Swish & Swallow Given 7/21/19 0118)   aluminum-magnesium hydroxide-simethicone (MYLANTA) 200-200-20 mg/5 mL oral suspension 30 mL (30 mL Oral Given 7/21/19 0117)   ondansetron (ZOFRAN) injection 4 mg (4 mg Intravenous Given 7/21/19 0117)   iohexol (OMNIPAQUE) 350 MG/ML injection (MULTI-DOSE) 100 mL (100 mL Intravenous Given 7/21/19 0300)       Diagnostic Studies  Results Reviewed     Procedure Component Value Units Date/Time    Comprehensive metabolic panel [[id number]]  (Abnormal) Collected:  07/21/19 0118    Lab Status:  Final result Specimen:  Blood from Arm, Right Updated:  07/21/19 0207     Sodium 142 mmol/L      Potassium 3 7 mmol/L      Chloride 112 mmol/L      CO2 23 mmol/L      ANION GAP 7 mmol/L      BUN 17 mg/dL      Creatinine 0 69 mg/dL      Glucose 103 mg/dL      Calcium 9 0 mg/dL      AST 53 U/L      ALT 59 U/L      Alkaline Phosphatase 102 U/L      Total Protein 7 6 g/dL      Albumin 3 4 g/dL      Total Bilirubin 0 46 mg/dL      eGFR 110 ml/min/1 73sq m     Narrative:       [place] guidelines for Chronic Kidney Disease (CKD):     Stage 1 with normal or high GFR (GFR > 90 mL/min/1 73 square meters)    Stage 2 Mild CKD (GFR = 60-89 mL/min/1 73 square meters)    Stage 3A Moderate CKD (GFR = 45-59 mL/min/1 73 square meters)    Stage 3B Moderate CKD (GFR = 30-44 mL/min/1 73 square meters)    Stage 4 Severe CKD (GFR = 15-29 mL/min/1 73 square meters)    Stage 5 End Stage CKD (GFR <15 mL/min/1 73 square meters)  Note: GFR calculation is accurate only with a steady state creatinine    Lipase [[id number]]  (Normal) Collected:  07/21/19 0118    Lab Status:  Final result Specimen:  Blood from Arm, Right Updated:  07/21/19 0207     Lipase 128 u/L     CBC and differential [[id number]]  (Abnormal) Collected:  07/21/19 0118    Lab Status:  Final result Specimen:  Blood from Arm, Right Updated:  07/21/19 0149     WBC 10 70 Thousand/uL      RBC 4 96 Million/uL      Hemoglobin 12 2 g/dL      Hematocrit 39 3 %      MCV 79 fL      MCH 24 6 pg      MCHC 31 0 g/dL      RDW 15 2 %      MPV 11 2 fL      Platelets 358 Thousands/uL      nRBC 0 /100 WBCs      Neutrophils Relative 87 %      Immat GRANS % 0 %      Lymphocytes Relative 6 %      Monocytes Relative 5 %      Eosinophils Relative 2 %      Basophils Relative 0 %      Neutrophils Absolute 9 24 Thousands/µL      Immature Grans Absolute 0 03 Thousand/uL      Lymphocytes Absolute 0 69 Thousands/µL      Monocytes Absolute 0 56 Thousand/µL      Eosinophils Absolute 0 17 Thousand/µL      Basophils Absolute 0 01 Thousands/µL     POCT pregnancy, urine [[id number]]  (Normal) Resulted:  07/21/19 0148    Lab Status:  Final result Updated:  07/21/19 0148     EXT PREG TEST UR (Ref: Negative) Negative     Control Valid                 CT abdomen pelvis with contrast   Final Result by Donna Ernandez MD (07/21 0404)      Findings consistent with mild colitis, likely of infectious or inflammatory etiology            Workstation performed: [id number]               Procedures  Procedures        ED Course                               MDM    Disposition  Final diagnoses:   Epigastric pain   Nausea and vomiting   Diarrhea, unspecified type   Colitis     Time reflects when diagnosis was documented in both MDM as applicable and the Disposition within this note     Time User Action Codes Description Comment    7/21/2019  2:40 AM Kay Falk Add [R10 13] Epigastric pain     7/21/2019  2:40 AM Dorotha Common, Mozell Rhein Add [R11 2] Nausea and vomiting     7/21/2019  2:40 AM Kay Falk Add [R19 7] Diarrhea, unspecified type     7/21/2019  4:17 AM Walter Gallegos Add [K52 9] Colitis     7/21/2019  4:17 AM Walter Gallegos Modify [R10 13] Epigastric pain     7/21/2019  4:17 AM Walter Gallegos Modify [K52 9] Colitis       ED Disposition     ED Disposition Condition Date/Time Comment    Discharge Stable Sun Jul 21, 2019  2:40 AM Judge Grayson discharge to home/self care  Follow-up Information     Follow up With Specialties Details Why Contact Info Additional Information    Maliha Alberto MD Family Medicine Go in 2 days  [facility] [address] [facility]  [address] [facility] Emergency Medicine  As needed, If symptoms worsen [address]  [phone]  ED, [address] [place] [address], [facility]          Discharge Medication List as of 7/21/2019  4:19 AM      START taking these medications    Details   ondansetron (ZOFRAN-ODT) 4 mg disintegrating tablet Take 1 tablet (4 mg total) by mouth every 8 (eight) hours for 3 days, Starting Sun 7/21/2019, Until Wed 7/24/2019, Normal         CONTINUE these medications which have NOT CHANGED    Details   albuterol (2 5 mg/3 mL) 0 083 % nebulizer solution Take 1 vial (2 5 mg total) by nebulization every 6 (six) hours as needed for wheezing or shortness of breath, Starting Thu 3/14/2019, Normal      albuterol (PROVENTIL HFA,VENTOLIN HFA) 90 mcg/act inhaler Inhale 2 puffs every 4 (four) hours as needed for wheezing, Starting Fri 11/23/2018, Print      levothyroxine 50 mcg tablet Take 1 tablet (50 mcg total) by mouth daily, Starting Thu 6/6/2019, Normal      nystatin (MYCOSTATIN) powder Apply topically 2 (two) times a day, Starting Thu 6/6/2019, Normal           No discharge procedures on file  ED Provider  Attending physically available and evaluated Judge Kenan CARY managed the patient along with the ED Attending      Electronically Signed by Velinda Barthel,   07/21/19 5051

## 2019-07-21 NOTE — ED ATTENDING ATTESTATION
José Luis Sarmiento DO, saw and evaluated the patient  I have discussed the patient with the resident/non-physician practitioner and agree with the resident's/non-physician practitioner's findings, Plan of Care, and MDM as documented in the resident's/non-physician practitioner's note, except where noted  All available labs and Radiology studies were reviewed  I was present for key portions of any procedure(s) performed by the resident/non-physician practitioner and I was immediately available to provide assistance  At this point I agree with the current assessment done in the Emergency Department  I have conducted an independent evaluation of this patient a history and physical is as follows:    45 yo female presents for evaluation of abd cramping pain, diarrhea, n/v (NBNB)  Pain localized to epigastric region, rated as 8/10, constant but waxes and wanes  No a/e factors  Hx cholecystectomy  abd snd mild to mod TTP epigastric region  No r/g bs+      Imp: abd cramping pain, n/v/d  Likely infectious vs toxin mediated AGE plan: abd labs, tx sx, reassess        Critical Care Time  Procedures

## 2019-09-07 ENCOUNTER — APPOINTMENT (EMERGENCY)
Dept: RADIOLOGY | Facility: HOSPITAL | Age: 39
End: 2019-09-07
Payer: COMMERCIAL

## 2019-09-07 ENCOUNTER — HOSPITAL ENCOUNTER (EMERGENCY)
Facility: HOSPITAL | Age: 39
Discharge: HOME | End: 2019-09-07
Attending: EMERGENCY MEDICINE | Admitting: EMERGENCY MEDICINE
Payer: COMMERCIAL

## 2019-09-07 VITALS
TEMPERATURE: 97.8 F | DIASTOLIC BLOOD PRESSURE: 75 MMHG | HEART RATE: 85 BPM | SYSTOLIC BLOOD PRESSURE: 152 MMHG | OXYGEN SATURATION: 100 % | RESPIRATION RATE: 18 BRPM

## 2019-09-07 DIAGNOSIS — Z3A.01 LESS THAN 8 WEEKS GESTATION OF PREGNANCY: ICD-10-CM

## 2019-09-07 DIAGNOSIS — R10.13 EPIGASTRIC PAIN: Primary | ICD-10-CM

## 2019-09-07 LAB
ALBUMIN SERPL BCP-MCNC: 3.6 G/DL (ref 3.5–5)
ALP SERPL-CCNC: 93 U/L (ref 46–116)
ALT SERPL W P-5'-P-CCNC: 26 U/L (ref 12–78)
ANION GAP SERPL CALCULATED.3IONS-SCNC: 9 MMOL/L (ref 4–13)
AST SERPL W P-5'-P-CCNC: 10 U/L (ref 5–45)
B-HCG SERPL-ACNC: ABNORMAL MIU/ML
BASOPHILS # BLD AUTO: 0.02 THOUSANDS/ΜL (ref 0–0.1)
BASOPHILS NFR BLD AUTO: 0 % (ref 0–1)
BILIRUB SERPL-MCNC: 0.36 MG/DL (ref 0.2–1)
BILIRUB UR QL STRIP: NEGATIVE
BUN SERPL-MCNC: 20 MG/DL (ref 5–25)
CALCIUM SERPL-MCNC: 9.2 MG/DL (ref 8.3–10.1)
CHLORIDE SERPL-SCNC: 107 MMOL/L (ref 100–108)
CLARITY UR: CLEAR
CO2 SERPL-SCNC: 19 MMOL/L (ref 21–32)
COLOR UR: YELLOW
COLOR, POC: YELLOW
CREAT SERPL-MCNC: 0.83 MG/DL (ref 0.6–1.3)
EOSINOPHIL # BLD AUTO: 0.16 THOUSAND/ΜL (ref 0–0.61)
EOSINOPHIL NFR BLD AUTO: 2 % (ref 0–6)
ERYTHROCYTE [DISTWIDTH] IN BLOOD BY AUTOMATED COUNT: 18.4 % (ref 11.6–15.1)
EXT PREG TEST URINE: POSITIVE
EXT. CONTROL ED NAV: ABNORMAL
GFR SERPL CREATININE-BSD FRML MDRD: 89 ML/MIN/1.73SQ M
GLUCOSE SERPL-MCNC: 92 MG/DL (ref 65–140)
GLUCOSE UR STRIP-MCNC: NEGATIVE MG/DL
HCT VFR BLD AUTO: 40.7 % (ref 34.8–46.1)
HGB BLD-MCNC: 13 G/DL (ref 11.5–15.4)
HGB UR QL STRIP.AUTO: NEGATIVE
IMM GRANULOCYTES # BLD AUTO: 0.03 THOUSAND/UL (ref 0–0.2)
IMM GRANULOCYTES NFR BLD AUTO: 0 % (ref 0–2)
KETONES UR STRIP-MCNC: ABNORMAL MG/DL
LEUKOCYTE ESTERASE UR QL STRIP: NEGATIVE
LIPASE SERPL-CCNC: 144 U/L (ref 73–393)
LYMPHOCYTES # BLD AUTO: 1.37 THOUSANDS/ΜL (ref 0.6–4.47)
LYMPHOCYTES NFR BLD AUTO: 17 % (ref 14–44)
MCH RBC QN AUTO: 25.1 PG (ref 26.8–34.3)
MCHC RBC AUTO-ENTMCNC: 31.9 G/DL (ref 31.4–37.4)
MCV RBC AUTO: 79 FL (ref 82–98)
MONOCYTES # BLD AUTO: 0.47 THOUSAND/ΜL (ref 0.17–1.22)
MONOCYTES NFR BLD AUTO: 6 % (ref 4–12)
NEUTROPHILS # BLD AUTO: 5.97 THOUSANDS/ΜL (ref 1.85–7.62)
NEUTS SEG NFR BLD AUTO: 75 % (ref 43–75)
NITRITE UR QL STRIP: NEGATIVE
NRBC BLD AUTO-RTO: 0 /100 WBCS
PH UR STRIP.AUTO: 6 [PH] (ref 4.5–8)
PLATELET # BLD AUTO: 270 THOUSANDS/UL (ref 149–390)
PMV BLD AUTO: 11.7 FL (ref 8.9–12.7)
POTASSIUM SERPL-SCNC: 3.5 MMOL/L (ref 3.5–5.3)
PROT SERPL-MCNC: 8.4 G/DL (ref 6.4–8.2)
PROT UR STRIP-MCNC: NEGATIVE MG/DL
RBC # BLD AUTO: 5.17 MILLION/UL (ref 3.81–5.12)
SODIUM SERPL-SCNC: 135 MMOL/L (ref 136–145)
SP GR UR STRIP.AUTO: 1.02 (ref 1–1.03)
UROBILINOGEN UR QL STRIP.AUTO: 0.2 E.U./DL
WBC # BLD AUTO: 8.02 THOUSAND/UL (ref 4.31–10.16)

## 2019-09-07 PROCEDURE — 96375 TX/PRO/DX INJ NEW DRUG ADDON: CPT

## 2019-09-07 PROCEDURE — 84702 CHORIONIC GONADOTROPIN TEST: CPT | Performed by: EMERGENCY MEDICINE

## 2019-09-07 PROCEDURE — 85025 COMPLETE CBC W/AUTO DIFF WBC: CPT | Performed by: EMERGENCY MEDICINE

## 2019-09-07 PROCEDURE — 80053 COMPREHEN METABOLIC PANEL: CPT | Performed by: EMERGENCY MEDICINE

## 2019-09-07 PROCEDURE — 81025 URINE PREGNANCY TEST: CPT | Performed by: EMERGENCY MEDICINE

## 2019-09-07 PROCEDURE — 96374 THER/PROPH/DIAG INJ IV PUSH: CPT

## 2019-09-07 PROCEDURE — 81003 URINALYSIS AUTO W/O SCOPE: CPT

## 2019-09-07 PROCEDURE — 96361 HYDRATE IV INFUSION ADD-ON: CPT

## 2019-09-07 PROCEDURE — 83690 ASSAY OF LIPASE: CPT | Performed by: EMERGENCY MEDICINE

## 2019-09-07 PROCEDURE — 99284 EMERGENCY DEPT VISIT MOD MDM: CPT

## 2019-09-07 PROCEDURE — 36415 COLL VENOUS BLD VENIPUNCTURE: CPT | Performed by: EMERGENCY MEDICINE

## 2019-09-07 PROCEDURE — 99284 EMERGENCY DEPT VISIT MOD MDM: CPT | Performed by: EMERGENCY MEDICINE

## 2019-09-07 RX ORDER — KETOROLAC TROMETHAMINE 30 MG/ML
15 INJECTION, SOLUTION INTRAMUSCULAR; INTRAVENOUS ONCE
Status: COMPLETED | OUTPATIENT
Start: 2019-09-07 | End: 2019-09-07

## 2019-09-07 RX ORDER — ONDANSETRON 2 MG/ML
4 INJECTION INTRAMUSCULAR; INTRAVENOUS ONCE
Status: COMPLETED | OUTPATIENT
Start: 2019-09-07 | End: 2019-09-07

## 2019-09-07 RX ADMIN — ONDANSETRON 4 MG: 2 INJECTION INTRAMUSCULAR; INTRAVENOUS at 06:33

## 2019-09-07 RX ADMIN — SODIUM CHLORIDE 1000 ML: 0.9 INJECTION, SOLUTION INTRAVENOUS at 06:35

## 2019-09-07 RX ADMIN — KETOROLAC TROMETHAMINE 15 MG: 30 INJECTION, SOLUTION INTRAMUSCULAR at 06:33

## 2019-09-07 NOTE — ED PROVIDER NOTES
History  Chief Complaint   Patient presents with    Abdominal Pain     pt reports sharp umbilical pain that woke her from sleep  States she was told she has collitis one month ago  Denies N/V/D/F     HPI    44 y o  F presents to the ED for evaluation of abdominal pain  States it started 5 hours ago  Woke her up from sleep  Stabbing pain in her right lower quadrant region, with some radiation to her umbilical region  She describes it as a stabbing pain  It is intense pain, that is somewhat resolving  She has nausea, but no emesis  No diarrhea  Last BM normal, non bloody  Took tylenol hours ago  Some relief of symptoms  LMP , has hx of hypothyroidism  Does have unprotected intercourse  No vaginal discharge or complaints  Did have a CT abd pelvis , was diagnosed with a colitis  Hx of cholecystectomy and C section  Prior to Admission Medications   Prescriptions Last Dose Informant Patient Reported? Taking?    albuterol (2 5 mg/3 mL) 0 083 % nebulizer solution   No No   Sig: Take 1 vial (2 5 mg total) by nebulization every 6 (six) hours as needed for wheezing or shortness of breath   albuterol (PROVENTIL HFA,VENTOLIN HFA) 90 mcg/act inhaler   No No   Sig: Inhale 2 puffs every 4 (four) hours as needed for wheezing   levothyroxine 50 mcg tablet   No No   Sig: Take 1 tablet (50 mcg total) by mouth daily   nystatin (MYCOSTATIN) powder   No No   Sig: Apply topically 2 (two) times a day   Patient not taking: Reported on 2019   ondansetron (ZOFRAN-ODT) 4 mg disintegrating tablet   No No   Sig: Take 1 tablet (4 mg total) by mouth every 8 (eight) hours for 3 days      Facility-Administered Medications: None       Past Medical History:   Diagnosis Date    Disease of thyroid gland     Hypothyroidism        Past Surgical History:   Procedure Laterality Date     SECTION      CHOLECYSTECTOMY      DENTAL SURGERY      Somers teeth extraction       Family History   Problem Relation Age of Onset  COPD Mother     Heart disease Mother    Vena Emilie HIV Father     Alcohol abuse Father     Substance Abuse Father     Seizures Father     Seizures Sister     Post-traumatic stress disorder Sister     Bipolar disorder Brother     Seizures Daughter     Hypothyroidism Paternal Grandmother     Breast cancer Maternal Aunt      I have reviewed and agree with the history as documented  Social History     Tobacco Use    Smoking status: Never Smoker    Smokeless tobacco: Never Used   Substance Use Topics    Alcohol use: Yes     Comment: Rare    Drug use: No        Review of Systems   Constitutional: Negative for chills, fatigue and fever  HENT: Negative for sore throat  Eyes: Negative for redness and visual disturbance  Respiratory: Negative for cough and shortness of breath  Cardiovascular: Negative for chest pain  Gastrointestinal: Positive for abdominal pain and nausea  Negative for blood in stool, constipation, diarrhea and vomiting  Genitourinary: Negative for difficulty urinating, dysuria and pelvic pain  Musculoskeletal: Negative for back pain  Skin: Negative for rash  Neurological: Negative for syncope, weakness and headaches  All other systems reviewed and are negative  Physical Exam  ED Triage Vitals   Temperature Pulse Respirations Blood Pressure SpO2   09/07/19 0604 09/07/19 0604 09/07/19 0604 09/07/19 0604 09/07/19 0604   97 8 °F (36 6 °C) (!) 108 18 152/75 100 %      Temp Source Heart Rate Source Patient Position - Orthostatic VS BP Location FiO2 (%)   09/07/19 0604 09/07/19 0604 -- 09/07/19 0604 --   Oral Monitor  Right arm       Pain Score       09/07/19 0633       8             Orthostatic Vital Signs  Vitals:    09/07/19 0604 09/07/19 0757   BP: 152/75    Pulse: (!) 108 85       Physical Exam   Constitutional: She is oriented to person, place, and time  She appears well-developed and well-nourished  No distress  HENT:   Head: Normocephalic and atraumatic     Eyes: Conjunctivae are normal    Neck: Normal range of motion  Cardiovascular: Normal rate, regular rhythm and normal heart sounds  No murmur heard  Pulmonary/Chest: Effort normal and breath sounds normal  No respiratory distress  Abdominal: Soft  Bowel sounds are normal  There is no tenderness  Musculoskeletal: Normal range of motion  Neurological: She is alert and oriented to person, place, and time  No cranial nerve deficit or sensory deficit  She exhibits normal muscle tone  Coordination normal    Skin: Skin is warm and dry  No rash noted  Psychiatric: She has a normal mood and affect  Nursing note and vitals reviewed        ED Medications  Medications   sodium chloride 0 9 % bolus 1,000 mL (0 mL Intravenous Stopped 9/7/19 0807)   ketorolac (TORADOL) injection 15 mg (15 mg Intravenous Given 9/7/19 0633)   ondansetron (ZOFRAN) injection 4 mg (4 mg Intravenous Given 9/7/19 0633)       Diagnostic Studies  Results Reviewed     Procedure Component Value Units Date/Time    Quantitative hCG [[id number]]  (Abnormal) Collected:  09/07/19 0636    Lab Status:  Final result Specimen:  Blood from Arm, Right Updated:  09/07/19 0724     HCG, Quant 31,197 mIU/mL     Narrative:        Expected Ranges:     Approximate               Approximate HCG  Gestation age          Concentration ( mIU/mL)  _____________          ______________________   Vane Infante                      HCG values  0 2-1                       5-50  1-2                           2-3                         100-5000  3-4                         500-08380  4-5                         1000-26764  5-6                         59208-001201  6-8                         30963-476253  8-12                        [id number]      Comprehensive metabolic panel [[id number]]  (Abnormal) Collected:  09/07/19 0636    Lab Status:  Final result Specimen:  Blood from Arm, Right Updated:  09/07/19 0717     Sodium 135 mmol/L      Potassium 3 5 mmol/L      Chloride 107 mmol/L      CO2 19 mmol/L      ANION GAP 9 mmol/L      BUN 20 mg/dL      Creatinine 0 83 mg/dL      Glucose 92 mg/dL      Calcium 9 2 mg/dL      AST 10 U/L      ALT 26 U/L      Alkaline Phosphatase 93 U/L      Total Protein 8 4 g/dL      Albumin 3 6 g/dL      Total Bilirubin 0 36 mg/dL      eGFR 89 ml/min/1 73sq m     Narrative:       National Kidney Disease Foundation guidelines for Chronic Kidney Disease (CKD):     Stage 1 with normal or high GFR (GFR > 90 mL/min/1 73 square meters)    Stage 2 Mild CKD (GFR = 60-89 mL/min/1 73 square meters)    Stage 3A Moderate CKD (GFR = 45-59 mL/min/1 73 square meters)    Stage 3B Moderate CKD (GFR = 30-44 mL/min/1 73 square meters)    Stage 4 Severe CKD (GFR = 15-29 mL/min/1 73 square meters)    Stage 5 End Stage CKD (GFR <15 mL/min/1 73 square meters)  Note: GFR calculation is accurate only with a steady state creatinine    Lipase [[id number]]  (Normal) Collected:  09/07/19 0636    Lab Status:  Final result Specimen:  Blood from Arm, Right Updated:  09/07/19 0717     Lipase 144 u/L     CBC and differential [[id number]]  (Abnormal) Collected:  09/07/19 0636    Lab Status:  Final result Specimen:  Blood from Arm, Right Updated:  09/07/19 0654     WBC 8 02 Thousand/uL      RBC 5 17 Million/uL      Hemoglobin 13 0 g/dL      Hematocrit 40 7 %      MCV 79 fL      MCH 25 1 pg      MCHC 31 9 g/dL      RDW 18 4 %      MPV 11 7 fL      Platelets 952 Thousands/uL      nRBC 0 /100 WBCs      Neutrophils Relative 75 %      Immat GRANS % 0 %      Lymphocytes Relative 17 %      Monocytes Relative 6 %      Eosinophils Relative 2 %      Basophils Relative 0 %      Neutrophils Absolute 5 97 Thousands/µL      Immature Grans Absolute 0 03 Thousand/uL      Lymphocytes Absolute 1 37 Thousands/µL      Monocytes Absolute 0 47 Thousand/µL      Eosinophils Absolute 0 16 Thousand/µL      Basophils Absolute 0 02 Thousands/µL     POCT urinalysis dipstick [[id number]]  (Normal) Resulted:  09/07/19 4848 Lab Status:  Final result Specimen:  Urine Updated:  09/07/19 0643     Color, UA yellow    POCT pregnancy, urine [[id number]]  (Abnormal) Resulted:  09/07/19 0643    Lab Status:  Final result Updated:  09/07/19 0643     EXT PREG TEST UR (Ref: Negative) positive     Control valid    ED Urine Macroscopic [[id number]]  (Abnormal) Collected:  09/07/19 0641    Lab Status:  Final result Specimen:  Urine Updated:  09/07/19 0640     Color, UA Yellow     Clarity, UA Clear     pH, UA 6 0     Leukocytes, UA Negative     Nitrite, UA Negative     Protein, UA Negative mg/dl      Glucose, UA Negative mg/dl      Ketones, UA 15 (1+) mg/dl      Urobilinogen, UA 0 2 E U /dl      Bilirubin, UA Negative     Blood, UA Negative     Specific Gravity, UA 1 020    Narrative:       CLINITEK RESULT                 No orders to display         Procedures  Procedures        ED Course  ED Course as of Sep 08 0539   Sat Sep 07, 2019   0732 HCG QUANTITATIVE(!): 31,197   0741 Confirmed IUP with   MDM     70-year-old female who presents to the emergency department for evaluation of abdominal pain  Patient had a positive pregnancy test here, she had a confirmed intrauterine pregnancy with a transvaginal ultrasound, with a fetal heart rate of 120  Patient was initially given Toradol prior to her pregnancy test coming back, spoke OBNORAHN, the patient does have minimal risk at this time, given that she is in her 1st trimester pregnancy  They recommended reassurance for the patient at this time, and avoiding NSAIDs during the duration of her future pregnancy  Patient was informed that if her pregnancy, and was not told to follow-up with OBGYN clinic for prenatal care  Patient had relief of nausea with Zofran prior to discharge  The patient was instructed to follow up as documented   Strict return precautions were discussed with the patient and the patient was instructed to return to the emergency department immediately if symptoms worsen  The patient/patient family member acknowledged and were in agreement with plan  Disposition  Final diagnoses:   Epigastric pain   Less than 8 weeks gestation of pregnancy     Time reflects when diagnosis was documented in both MDM as applicable and the Disposition within this note     Time User Action Codes Description Comment    9/7/2019  7:42 AM Caprice Labor Add [R10 13] Epigastric pain     9/7/2019  7:43 AM Caprice Labor Add [Z3A 01] Less than 8 weeks gestation of pregnancy       ED Disposition     ED Disposition Condition Date/Time Comment    Discharge Stable Sat Sep 7, 2019  7:42 AM Razia Bansal discharge to home/self care  Follow-up Information     Follow up With Specialties Details Why [facility] Obstetrics and Gynecology Schedule an appointment as soon as possible for a visit today For follow up regarding your symptoms and recheck   Maryjane 10 [id number]  [address] [place] [address], Jenny Jones MD Family Medicine Schedule an appointment as soon as possible for a visit in 1 week For follow up regarding your symptoms and recheck, As needed [address] [place] [address] [address]  [phone]             Discharge Medication List as of 9/7/2019  7:45 AM      CONTINUE these medications which have NOT CHANGED    Details   albuterol (2 5 mg/3 mL) 0 083 % nebulizer solution Take 1 vial (2 5 mg total) by nebulization every 6 (six) hours as needed for wheezing or shortness of breath, Starting Thu 3/14/2019, Normal      albuterol (PROVENTIL HFA,VENTOLIN HFA) 90 mcg/act inhaler Inhale 2 puffs every 4 (four) hours as needed for wheezing, Starting Fri 11/23/2018, Print      levothyroxine 50 mcg tablet Take 1 tablet (50 mcg total) by mouth daily, Starting Thu 6/6/2019, Normal      nystatin (MYCOSTATIN) powder Apply topically 2 (two) times a day, Starting Thu 6/6/2019, Normal      ondansetron (ZOFRAN-ODT) 4 mg disintegrating tablet Take 1 tablet (4 mg total) by mouth every 8 (eight) hours for 3 days, Starting Sun 7/21/2019, Until Wed 7/24/2019, Normal           No discharge procedures on file  ED Provider  Attending physically available and evaluated Maira Byrne I managed the patient along with the ED Attending      Electronically Signed by         Cozette Nageotte, MD  09/08/19 6181

## 2019-09-07 NOTE — ED ATTENDING ATTESTATION
Colletta Schmidt, DO, saw and evaluated the patient  I have discussed the patient with the resident/non-physician practitioner and agree with the resident's/non-physician practitioner's findings, Plan of Care, and MDM as documented in the resident's/non-physician practitioner's note, except where noted  All available labs and Radiology studies were reviewed  I was present for key portions of any procedure(s) performed by the resident/non-physician practitioner and I was immediately available to provide assistance  At this point I agree with the current assessment done in the Emergency Department  I have conducted an independent evaluation of this patient a history and physical is as follows:     70-year-old female accompanied by her male clint  The patient was awoke at about 1:00 a m  This morning with epigastric pain which she described as being somewhat sharp and stabbing,, has now had eased off a little bit still somewhat dull  No migration of the pain, some nausea but no vomiting  No diarrhea, says that her last bowel was yesterday but recently she had been having some increased constipation and only having a bowel movement every several days  No vaginal bleeding or discharge  Nothing makes the pain worse or better  No anorexia  No migration to the pain  no vaginal bleeding or discharge  She does have unprotected intercourse, is not trying to get pregnant but would not be upset if she does become pregnant    Is not taking fertility treatments    General:  Patient is well-appearing  Head:  Atraumatic  Eyes:  Conjunctiva pink  ENT:  Mucous membranes are moist  Neck:  Supple  Cardiac:  S1-S2, without murmurs  Lungs:  Clear to auscultation bilaterally  Abdomen:    Mild epigastric tenderness, no CVA tenderness, no tympany, no rigidity, no guarding, bowel sounds normal  Extremities:  Normal range of motion  Neurologic:  Awake, fluent speech, normal comprehension, AAOx3  Skin:  Pink warm and dry  Psychiatric:  Alert, pleasant, cooperative     CBC, CMP, lipase, urinalysis is currently pending  Urine pregnancy is positive, quantitative HCG is pending, and pelvic ultrasound is pending  Pt signed out to ROXANA Lee      Critical Care Time  Procedures

## 2019-09-17 ENCOUNTER — OFFICE VISIT (OUTPATIENT)
Dept: OBGYN CLINIC | Facility: CLINIC | Age: 39
End: 2019-09-17
Payer: COMMERCIAL

## 2019-09-17 VITALS — DIASTOLIC BLOOD PRESSURE: 72 MMHG | WEIGHT: 220.4 LBS | SYSTOLIC BLOOD PRESSURE: 114 MMHG | BODY MASS INDEX: 40.31 KG/M2

## 2019-09-17 DIAGNOSIS — N91.2 AMENORRHEA: Primary | ICD-10-CM

## 2019-09-17 PROBLEM — Z98.891 PREVIOUS CESAREAN SECTION: Status: ACTIVE | Noted: 2019-09-17

## 2019-09-17 PROBLEM — E03.9 HYPOTHYROIDISM: Status: ACTIVE | Noted: 2019-09-17

## 2019-09-17 PROBLEM — O09.529 AMA (ADVANCED MATERNAL AGE) MULTIGRAVIDA 35+: Status: ACTIVE | Noted: 2019-09-17

## 2019-09-17 PROCEDURE — 99203 OFFICE O/P NEW LOW 30 MIN: CPT | Performed by: OBSTETRICS & GYNECOLOGY

## 2019-09-17 PROCEDURE — 76830 TRANSVAGINAL US NON-OB: CPT | Performed by: OBSTETRICS & GYNECOLOGY

## 2019-09-17 NOTE — PROGRESS NOTES
AMB US Pelvic Non OB  Date/Time: 9/17/2019 3:51 PM  Performed by: Mónica Schneider MD  Authorized by: Mónica Schneider MD     Procedure details:     Indications comment:  Amenorrhea    Technique:  Transvaginal US, Non-OB    Position: lithotomy exam    Uterine findings:     Adnexal mass: not identified      Myomas: not identified    Left ovary findings:     Left ovary:  Visualized    Cysts: not identified    Right ovary findings:     Right ovary:  Visualized    Cysts: not identified    Other findings:     Free pelvic fluid: not identified    Post-Procedure Details:     Impression:  Single viable intrauterine gestation CRL 1 55cm c/w 8 weeks 0 days FHM 167bpm    [facility] 4/23/2020

## 2019-09-22 ENCOUNTER — HOSPITAL ENCOUNTER (EMERGENCY)
Facility: HOSPITAL | Age: 39
Discharge: HOME | End: 2019-09-22
Attending: EMERGENCY MEDICINE | Admitting: EMERGENCY MEDICINE
Payer: COMMERCIAL

## 2019-09-22 VITALS
DIASTOLIC BLOOD PRESSURE: 65 MMHG | SYSTOLIC BLOOD PRESSURE: 126 MMHG | HEART RATE: 91 BPM | TEMPERATURE: 98.1 F | OXYGEN SATURATION: 97 % | RESPIRATION RATE: 16 BRPM

## 2019-09-22 DIAGNOSIS — M54.50 ACUTE MIDLINE LOW BACK PAIN WITHOUT SCIATICA: Primary | ICD-10-CM

## 2019-09-22 LAB
BILIRUB UR QL STRIP: NEGATIVE
CLARITY UR: CLEAR
COLOR UR: YELLOW
COLOR, POC: NORMAL
GLUCOSE UR STRIP-MCNC: NEGATIVE MG/DL
HGB UR QL STRIP.AUTO: NEGATIVE
KETONES UR STRIP-MCNC: NEGATIVE MG/DL
LEUKOCYTE ESTERASE UR QL STRIP: NEGATIVE
NITRITE UR QL STRIP: NEGATIVE
PH UR STRIP.AUTO: 7 [PH] (ref 4.5–8)
PROT UR STRIP-MCNC: NEGATIVE MG/DL
SP GR UR STRIP.AUTO: 1.01 (ref 1–1.03)
UROBILINOGEN UR QL STRIP.AUTO: 0.2 E.U./DL

## 2019-09-22 PROCEDURE — 99284 EMERGENCY DEPT VISIT MOD MDM: CPT | Performed by: EMERGENCY MEDICINE

## 2019-09-22 PROCEDURE — 99283 EMERGENCY DEPT VISIT LOW MDM: CPT

## 2019-09-22 PROCEDURE — 81003 URINALYSIS AUTO W/O SCOPE: CPT

## 2019-09-22 PROCEDURE — 87086 URINE CULTURE/COLONY COUNT: CPT

## 2019-09-22 RX ORDER — ACETAMINOPHEN 325 MG/1
975 TABLET ORAL ONCE
Status: COMPLETED | OUTPATIENT
Start: 2019-09-22 | End: 2019-09-22

## 2019-09-22 RX ORDER — ONDANSETRON 4 MG/1
4 TABLET, ORALLY DISINTEGRATING ORAL ONCE
Status: COMPLETED | OUTPATIENT
Start: 2019-09-22 | End: 2019-09-22

## 2019-09-22 RX ORDER — ACETAMINOPHEN 500 MG
1000 TABLET ORAL EVERY 6 HOURS PRN
Qty: 30 TABLET | Refills: 0 | Status: SHIPPED | OUTPATIENT
Start: 2019-09-22 | End: 2020-11-13 | Stop reason: ALTCHOICE

## 2019-09-22 RX ORDER — PYRIDOXINE HCL (VITAMIN B6) 25 MG
25 TABLET ORAL EVERY 8 HOURS PRN
Qty: 30 TABLET | Refills: 0 | Status: SHIPPED | OUTPATIENT
Start: 2019-09-22 | End: 2020-02-06

## 2019-09-22 RX ORDER — LIDOCAINE 40 MG/G
CREAM TOPICAL AS NEEDED
Qty: 30 G | Refills: 0 | Status: SHIPPED | OUTPATIENT
Start: 2019-09-22 | End: 2020-02-06

## 2019-09-22 RX ORDER — LIDOCAINE 50 MG/G
1 PATCH TOPICAL ONCE
Status: DISCONTINUED | OUTPATIENT
Start: 2019-09-22 | End: 2019-09-22 | Stop reason: HOSPADM

## 2019-09-22 RX ORDER — LEVOTHYROXINE SODIUM 0.05 MG/1
50 TABLET ORAL DAILY
COMMUNITY
End: 2019-10-23 | Stop reason: DRUGHIGH

## 2019-09-22 RX ADMIN — ONDANSETRON 4 MG: 4 TABLET, ORALLY DISINTEGRATING ORAL at 08:47

## 2019-09-22 RX ADMIN — LIDOCAINE 1 PATCH: 50 PATCH CUTANEOUS at 08:47

## 2019-09-22 RX ADMIN — ACETAMINOPHEN 975 MG: 325 TABLET ORAL at 08:49

## 2019-09-22 NOTE — ED NOTES
Pt denies vaginal bleeding  Pt states she has vaginal pressure        Brenda Braun RN  97/84/50 [id number]

## 2019-09-22 NOTE — ED PROVIDER NOTES
History  Chief Complaint   Patient presents with    Back Pain     Pt states that she is almost 9 weeks pregnant and started ith lower back pain and pressure in her vagina last night  Ot denies vaginal bleeding or discharge  Denies abdominal pain     Patient is a 70-year-old , 9 weeks pregnant by dates with a confirmed intrauterine pregnancy who presents for evaluation of lower back pain  Pain started suddenly this morning at around 8:30 p m  After a sudden turning movement  Pain is achy/crampy, mild at rest but worse with certain positions/movements  Associated with a sensation of vaginal "pressure"  No vomiting, vaginal bleeding or discharge, dysuria, hematuria  Denies similar symptoms in the past           Prior to Admission Medications   Prescriptions Last Dose Informant Patient Reported? Taking?    Prenatal MV-Min-FA-Omega-3 (PRENATAL GUMMIES/DHA & FA PO) 2019 at Unknown time  Yes Yes   Sig: Take by mouth   albuterol (2 5 mg/3 mL) 0 083 % nebulizer solution  Self No Yes   Sig: Take 1 vial (2 5 mg total) by nebulization every 6 (six) hours as needed for wheezing or shortness of breath   albuterol (PROVENTIL HFA,VENTOLIN HFA) 90 mcg/act inhaler  Self No Yes   Sig: Inhale 2 puffs every 4 (four) hours as needed for wheezing   levothyroxine 50 mcg tablet 2019 at Unknown time  Yes Yes   Sig: Take 50 mcg by mouth daily   ondansetron (ZOFRAN-ODT) 4 mg disintegrating tablet   No Yes   Sig: Take 1 tablet (4 mg total) by mouth every 8 (eight) hours for 3 days      Facility-Administered Medications: None       Past Medical History:   Diagnosis Date    Disease of thyroid gland     Hypothyroidism        Past Surgical History:   Procedure Laterality Date     SECTION  2009     SECTION  2012    CHOLECYSTECTOMY  2013    DENTAL SURGERY      [place] teeth extraction       Family History   Problem Relation Age of Onset    COPD Mother     Heart disease Mother     HIV Father  Alcohol abuse Father     Substance Abuse Father     Seizures Father     Depression Father     Seizures Sister     Post-traumatic stress disorder Sister     Anxiety disorder Sister     Depression Sister     Bipolar disorder Brother     Anxiety disorder Brother     Depression Brother     Seizures Daughter     Anxiety disorder Daughter     Depression Daughter     Hypothyroidism Paternal Grandmother     Breast cancer Maternal Aunt     Colon cancer Maternal Aunt     Anxiety disorder Son      I have reviewed and agree with the history as documented  Social History     Tobacco Use    Smoking status: Never Smoker    Smokeless tobacco: Never Used   Substance Use Topics    Alcohol use: Yes     Comment: not currently    Drug use: No        Review of Systems   Constitutional: Negative for chills, fatigue and fever  HENT: Negative for congestion and sore throat  Eyes: Negative for visual disturbance  Respiratory: Negative for cough and shortness of breath  Cardiovascular: Negative for chest pain  Gastrointestinal: Negative for abdominal pain, diarrhea, nausea and vomiting  Endocrine: Negative for polyuria  Genitourinary: Negative for decreased urine volume, difficulty urinating, dysuria, flank pain, frequency, hematuria, pelvic pain, vaginal bleeding, vaginal discharge and vaginal pain  Musculoskeletal: Positive for back pain  Negative for arthralgias  Skin: Negative for rash  Neurological: Negative for dizziness, light-headedness and headaches  All other systems reviewed and are negative        Physical Exam  ED Triage Vitals [09/22/19 0707]   Temperature Pulse Respirations Blood Pressure SpO2   98 1 °F (36 7 °C) 102 18 160/71 98 %      Temp Source Heart Rate Source Patient Position - Orthostatic VS BP Location FiO2 (%)   Oral Monitor Sitting Right arm --      Pain Score       8             Orthostatic Vital Signs  Vitals:    09/22/19 0707 09/22/19 0851   BP: 160/71 126/65 Pulse: 102 91   Patient Position - Orthostatic VS: Sitting Sitting       Physical Exam   Constitutional: She is oriented to person, place, and time  She appears well-developed and well-nourished  No distress  HENT:   Head: Normocephalic and atraumatic  Eyes: Pupils are equal, round, and reactive to light  EOM are normal    Neck: Normal range of motion  Neck supple  Cardiovascular: Normal rate, regular rhythm and normal heart sounds  Pulmonary/Chest: Effort normal and breath sounds normal  No respiratory distress  Abdominal: Soft  Bowel sounds are normal  There is no tenderness  Musculoskeletal: Normal range of motion  Back:    Neurological: She is alert and oriented to person, place, and time  Skin: Skin is warm and dry  Psychiatric: She has a normal mood and affect  Nursing note and vitals reviewed        ED Medications  Medications   ondansetron (ZOFRAN-ODT) dispersible tablet 4 mg (4 mg Oral Given 9/22/19 0847)   acetaminophen (TYLENOL) tablet 975 mg (975 mg Oral Given 9/22/19 0849)       Diagnostic Studies  Results Reviewed     Procedure Component Value Units Date/Time    Urine culture [[id number]] Collected:  09/22/19 0802    Lab Status:  Final result Specimen:  Urine, Clean Catch Updated:  09/23/19 0720     Urine Culture No Growth <1000 cfu/mL    POCT urinalysis dipstick [[id number]]  (Normal) Resulted:  09/22/19 0801    Lab Status:  Final result Specimen:  Urine Updated:  09/22/19 0801     Color, UA clear/yellow    ED Urine Macroscopic [[id number]] Collected:  09/22/19 0802    Lab Status:  Final result Specimen:  Urine Updated:  09/22/19 0801     Color, UA Yellow     Clarity, UA Clear     pH, UA 7 0     Leukocytes, UA Negative     Nitrite, UA Negative     Protein, UA Negative mg/dl      Glucose, UA Negative mg/dl      Ketones, UA Negative mg/dl      Urobilinogen, UA 0 2 E U /dl      Bilirubin, UA Negative     Blood, UA Negative     Specific Gravity, UA 1 015    Narrative:       CLINITEK RESULT                 No orders to display         Procedures  Procedures        ED Course                               MDM  Number of Diagnoses or Management Options  Acute midline low back pain without sciatica:   Diagnosis management comments: Patient is a 68-year-old , 9 weeks pregnant by dates with a confirmed intrauterine pregnancy who presents for evaluation of lower back pain  Exam shows mild soft tissue tenderness of the paraspinal area without associated swelling, deformity, or skin wounds/rash  No associated abdominal tenderness  Obtain urinalysis to evaluate for UTI  Urinalysis unremarkable  Consistent with musculoskeletal pain/strain  Patient treated symptomatically and reassured  Discharged with instructions to follow up with OB, primary care  Return precautions given  Disposition  Final diagnoses:   Acute midline low back pain without sciatica     Time reflects when diagnosis was documented in both MDM as applicable and the Disposition within this note     Time User Action Codes Description Comment    2019  8:38 AM Israel Ramírez Add [M54 5] Acute midline low back pain without sciatica       ED Disposition     ED Disposition Condition Date/Time Comment    Discharge Stable Sun Sep 22, 2019  8:18 AM Olamide Szymanski discharge to home/self care              Follow-up Information     Follow up With Specialties Details Why Contact Info Additional Information    Jacque Garvin MD Family Medicine Call in 1 day  [place] [id number] [facility]  [address] Emergency Department Emergency Medicine  As needed, If symptoms worsen [address]  [phone]  ED, [address] [place] [address], [id number]   [phone]          Discharge Medication List as of 2019  8:42 AM      START taking these medications    Details   acetaminophen (TYLENOL) 500 mg tablet Take 2 tablets (1,000 mg total) by mouth every 6 (six) hours as needed for mild pain or moderate pain, Starting Sun 9/22/2019, Print      doxylamine (UNISON) 25 MG tablet Take 1 tablet (25 mg total) by mouth daily at bedtime as needed for nausea, Starting Sun 9/22/2019, Print      lidocaine (LMX) 4 % cream Apply topically as needed for mild pain, Starting Sun 9/22/2019, Print      pyridoxine (B-6) 25 MG tablet Take 1 tablet (25 mg total) by mouth every 8 (eight) hours as needed (nausea), Starting Sun 9/22/2019, Print         CONTINUE these medications which have NOT CHANGED    Details   albuterol (2 5 mg/3 mL) 0 083 % nebulizer solution Take 1 vial (2 5 mg total) by nebulization every 6 (six) hours as needed for wheezing or shortness of breath, Starting u 3/14/2019, Normal      albuterol (PROVENTIL HFA,VENTOLIN HFA) 90 mcg/act inhaler Inhale 2 puffs every 4 (four) hours as needed for wheezing, Starting Fri 11/23/2018, Print      levothyroxine 50 mcg tablet Take 50 mcg by mouth daily, Historical Med      ondansetron (ZOFRAN-ODT) 4 mg disintegrating tablet Take 1 tablet (4 mg total) by mouth every 8 (eight) hours for 3 days, Starting Sun 7/21/2019, Until Sun 9/22/2019, Normal      Prenatal MV-Min-FA-Omega-3 (PRENATAL GUMMIES/DHA & FA PO) Take by mouth, Historical Med           No discharge procedures on file  ED Provider  Attending physically available and evaluated Luis Barrios I managed the patient along with the ED Attending      Electronically Signed by         Jennifer Cavanaugh MD  09/27/19 1790

## 2019-09-22 NOTE — ED ATTENDING ATTESTATION
2019  IKelsy MD, saw and evaluated the patient  I have discussed the patient with the resident/non-physician practitioner and agree with the resident's/non-physician practitioner's findings, Plan of Care, and MDM as documented in the resident's/non-physician practitioner's note, except where noted  All available labs and Radiology studies were reviewed  I was present for key portions of any procedure(s) performed by the resident/non-physician practitioner and I was immediately available to provide assistance  At this point I agree with the current assessment done in the Emergency Department  I have conducted an independent evaluation of this patient a history and physical is as follows:    ED Course         Critical Care Time  Procedures     45 yo female , 9 weeks pregnant, woke up with mid back pain  No trauma, no lifitng  No relief with tylenol  Pt also feeling pressure in vagina, no vaginal bleeding, no abdominal pain, no vomiting  No urinary complaints  Vss, afebrile, lungs cta, rrr, abdomen soft nontender, point tenderness on lumbar spine  Bedside u/s, urine, msk pain, lidocaine patch

## 2019-09-23 LAB — BACTERIA UR CULT: NORMAL

## 2019-10-15 ENCOUNTER — INITIAL PRENATAL (OUTPATIENT)
Dept: OBGYN CLINIC | Facility: CLINIC | Age: 39
End: 2019-10-15

## 2019-10-15 VITALS — BODY MASS INDEX: 43.35 KG/M2 | DIASTOLIC BLOOD PRESSURE: 64 MMHG | WEIGHT: 237 LBS | SYSTOLIC BLOOD PRESSURE: 118 MMHG

## 2019-10-15 VITALS — BODY MASS INDEX: 43.35 KG/M2 | DIASTOLIC BLOOD PRESSURE: 64 MMHG | SYSTOLIC BLOOD PRESSURE: 126 MMHG | WEIGHT: 237 LBS

## 2019-10-15 DIAGNOSIS — Z12.4 ENCOUNTER FOR PAPANICOLAOU SMEAR FOR CERVICAL CANCER SCREENING: ICD-10-CM

## 2019-10-15 DIAGNOSIS — R63.8 INCREASED BMI: ICD-10-CM

## 2019-10-15 DIAGNOSIS — Z11.51 SCREENING FOR HPV (HUMAN PAPILLOMAVIRUS): ICD-10-CM

## 2019-10-15 DIAGNOSIS — O21.9 NAUSEA AND VOMITING DURING PREGNANCY: ICD-10-CM

## 2019-10-15 DIAGNOSIS — E03.9 HYPOTHYROIDISM, UNSPECIFIED TYPE: ICD-10-CM

## 2019-10-15 DIAGNOSIS — Z34.91 ENCOUNTER FOR PREGNANCY RELATED EXAMINATION IN FIRST TRIMESTER: Primary | ICD-10-CM

## 2019-10-15 DIAGNOSIS — Z34.81 PRENATAL CARE, SUBSEQUENT PREGNANCY, FIRST TRIMESTER: Primary | ICD-10-CM

## 2019-10-15 LAB
SL AMB  POCT GLUCOSE, UA: NEGATIVE
SL AMB POCT URINE PROTEIN: NEGATIVE

## 2019-10-15 PROCEDURE — PNV: Performed by: STUDENT IN AN ORGANIZED HEALTH CARE EDUCATION/TRAINING PROGRAM

## 2019-10-15 PROCEDURE — 87591 N.GONORRHOEAE DNA AMP PROB: CPT | Performed by: STUDENT IN AN ORGANIZED HEALTH CARE EDUCATION/TRAINING PROGRAM

## 2019-10-15 PROCEDURE — 87491 CHLMYD TRACH DNA AMP PROBE: CPT | Performed by: STUDENT IN AN ORGANIZED HEALTH CARE EDUCATION/TRAINING PROGRAM

## 2019-10-15 PROCEDURE — G0145 SCR C/V CYTO,THINLAYER,RESCR: HCPCS | Performed by: STUDENT IN AN ORGANIZED HEALTH CARE EDUCATION/TRAINING PROGRAM

## 2019-10-15 PROCEDURE — 87624 HPV HI-RISK TYP POOLED RSLT: CPT | Performed by: STUDENT IN AN ORGANIZED HEALTH CARE EDUCATION/TRAINING PROGRAM

## 2019-10-15 PROCEDURE — OBC

## 2019-10-15 RX ORDER — PROMETHAZINE HYDROCHLORIDE 12.5 MG/1
12.5 TABLET ORAL EVERY 6 HOURS PRN
Qty: 30 TABLET | Refills: 1 | Status: ON HOLD | OUTPATIENT
Start: 2019-10-15 | End: 2020-03-23 | Stop reason: SDUPTHER

## 2019-10-15 NOTE — PROGRESS NOTES
Initial Prenatal Appointment - Obstetrics   Jayesh Corona 44 y o  female MRN: [id number]  227 M  [facility]  Encounter: [id number]    Chief Complaint   Patient presents with    Initial Prenatal Visit       History of Present Illness     Jayesh Corona is a 44 y o  female [id number] at 14w10d Estimated Date of Delivery: 20 by 8 week ultrasound consistent with LMP (Patient's last menstrual period was 2019 (exact date)  )  She presents for her initial prenatal appointment  She denies leakage of fluid, vaginal bleeding, or cramping  Concerns for her today include nausea for which Unisom and B6 has not helped  PRENATAL ISSUES  1  Hypothyroidism, currently on Synthroid 50 mcg daily  2  History of  section x2 (first for cord prolapse)  3  Interested in tubal sterilization    REVIEW OF SYSTEMS  CONSTITUTIONAL:  No weight loss, fever, chills, weakness  HEENT: No visual loss, blurred vision  SKIN: No rash or itching  CARDIOVASCULAR: No chest pain, chest pressure, or chest discomfort  RESPIRATORY: No shortness of breath, cough or sputum  GASTROINTESTINAL: No nausea, emesis, or diarrhea  NEUROLOGICAL: No headache, dizziness, syncope, paralysis  MUSCUOSKELETAL: No muscle, back pain, joint stiffness or bruising  INFECTIOUS: No fever, chills  PSYCHIATRIC: No disorder of thought or mood  HEMATOLOGIC: No easy bruising or bleeding    GYN: No vaginal bleeding or cramping    OB History        5    Para   3    Term   3       0    AB   1    Living   3       SAB   1    TAB   0    Ectopic   0    Multiple   0    Live Births   3                   #: 1, Date: 99, Sex: Female, Weight: 3062 g (6 lb 12 oz), GA: 40w0d, Delivery: Vaginal, Spontaneous, Apgar1: None, Apgar5: None, Living: Living, Birth Comments: FOB # 1    #: 2, Date: 05, Sex: Male, Weight: 3090 g (6 lb 13 oz), GA: 39w0d, Delivery: , Unspecified, Apgar1: None, Apgar5: None, Living: Living, Birth Comments: FOB#1    #: 3, Date: 12, Sex: Female, Weight: 3459 g (7 lb 10 oz), GA: 39w0d, Delivery: , Unspecified, Apgar1: None, Apgar5: None, Living: Living, Birth Comments: FOB#2    #: 4, Date: 10/2017, Sex: None, Weight: None, GA: None, Delivery: None, Apgar1: None, Apgar5: None, Living: None, Birth Comments: None    #: 5, Date: None, Sex: None, Weight: None, GA: None, Delivery: None, Apgar1: None, Apgar5: None, Living: None, Birth Comments: None    History of cord prolapse with first  section    GYN History  Patient's last menstrual period was 2019 (exact date)  no history abnormal Pap smears  no history of cryotherapy, LEEP, or cold knife cone of the cervix  no history STD (gonorrhea, chlamydia, herpes)  Past Medical History:   Diagnosis Date    Abnormal Pap smear of cervix     Anemia     Disease of thyroid gland     Fibroid     Was told during last pregnancy had fibroid  Asymtomtic    Hypothyroidism     Kidney stone     Years ago  Had during last pregnancy    Varicella     Patient did at child     Past Medical History:   Diagnosis Date    Abnormal Pap smear of cervix     Anemia     Disease of thyroid gland     Fibroid     Was told during last pregnancy had fibroid  Asymtomtic    Hypothyroidism     Kidney stone     Years ago   Had during last pregnancy    Varicella     Patient did at child       Patient Active Problem List   Diagnosis    AMA (advanced maternal age) multigravida 33+    Previous  section    Hypothyroidism       Past Surgical History:   Procedure Laterality Date     SECTION  2009     SECTION  2012    CHOLECYSTECTOMY  2013    DENTAL SURGERY      [place] teeth extraction       Family History   Problem Relation Age of Onset    COPD Mother     Heart disease Mother    Beau Levy HIV Father     Alcohol abuse Father     Substance Abuse Father     Seizures Father     Depression Father     Seizures Sister     Post-traumatic stress disorder Sister     Anxiety disorder Sister     Depression Sister     Bipolar disorder Brother     Anxiety disorder Brother     Depression Brother     Seizures Daughter     Anxiety disorder Daughter     Depression Daughter     Hypothyroidism Paternal Grandmother     Breast cancer Maternal Aunt     Colon cancer Maternal Aunt     Anxiety disorder Son     ADD / ADHD Son     Parkinsonism Maternal Grandmother     No Known Problems Maternal Grandfather     No Known Problems Paternal Grandfather     No Known Problems Half-Sister     No Known Problems Half-Sister     Diabetes Half-Brother     No Known Problems Daughter        Social History   Social History     Substance and Sexual Activity   Alcohol Use Yes    Comment: not currently     Social History     Substance and Sexual Activity   Drug Use No     Social History     Tobacco Use   Smoking Status Never Smoker   Smokeless Tobacco Never Used       Tobacco, alcohol, illicit drug use?: no  Occupation: ED registration for [employer]  Marital status: [facility]      Current Outpatient Medications:     acetaminophen (TYLENOL) 500 mg tablet, Take 2 tablets (1,000 mg total) by mouth every 6 (six) hours as needed for mild pain or moderate pain, Disp: 30 tablet, Rfl: 0    albuterol (2 5 mg/3 mL) 0 083 % nebulizer solution, Take 1 vial (2 5 mg total) by nebulization every 6 (six) hours as needed for wheezing or shortness of breath, Disp: 25 vial, Rfl: 4    albuterol (PROVENTIL HFA,VENTOLIN HFA) 90 mcg/act inhaler, Inhale 2 puffs every 4 (four) hours as needed for wheezing, Disp: 1 Inhaler, Rfl: 0    doxylamine (UNISON) 25 MG tablet, Take 1 tablet (25 mg total) by mouth daily at bedtime as needed for nausea, Disp: 30 tablet, Rfl: 0    levothyroxine 50 mcg tablet, Take 50 mcg by mouth daily, Disp: , Rfl:     Prenatal MV-Min-FA-Omega-3 (PRENATAL GUMMIES/DHA & FA PO), Take by mouth, Disp: , Rfl:     pyridoxine (B-6) 25 MG tablet, Take 1 tablet (25 mg total) by mouth every 8 (eight) hours as needed (nausea), Disp: 30 tablet, Rfl: 0    lidocaine (LMX) 4 % cream, Apply topically as needed for mild pain (Patient not taking: Reported on 10/15/2019), Disp: 30 g, Rfl: 0    ondansetron (ZOFRAN-ODT) 4 mg disintegrating tablet, Take 1 tablet (4 mg total) by mouth every 8 (eight) hours for 3 days, Disp: 9 tablet, Rfl: 0    promethazine (PHENERGAN) 12 5 MG tablet, Take 1 tablet (12 5 mg total) by mouth every 6 (six) hours as needed for nausea or vomiting, Disp: 30 tablet, Rfl: 1    Allergies   Allergen Reactions    Other Allergic Rhinitis     Seasonal  Grass       Objective   Vitals: Blood pressure 126/64, weight 108 kg (237 lb), last menstrual period 07/18/2019  Body mass index is 43 35 kg/m²  General: NAD, AAOx3  Neck: supple, no thyromegaly or thyroid nodules appreciated  Heart: RRR  Lungs: CTAB  Breast: nipples everted bilaterally, no skin changes  No dimpling, redness, or erythema  No breast masses or axillary masses bilaterally  Fibrocystic breast changes  Abdomen: soft, non-distended, non tender to palpation  Extremities: non-tender to palpation  Speculum exam: Normal appearing external genitalia, normal hair distribution  Urethra well-suspended, no clitoromegaly noted  Vagina pink, moist, well-rugated  Physiologic discharge noted  Cervix without lesion, parous appearing  No blood in vaginal vault    Pelvic exam: No cervical motion tenderness  No adnexal masses or tenderness  Anteverted uterus 12 week size  Lab Results:   Initial Prenatal on 10/15/2019   Component Date Value    POCT URINE PROTEIN 10/15/2019 negative     GLUCOSE, UA 10/15/2019 negative         Assessment/Plan     Vamsi Yanes was seen today for initial prenatal visit      Diagnoses and all orders for this visit:    Encounter for pregnancy related examination in first trimester  -     Chlamydia/GC amplified DNA by PCR  -     POCT urine dip    Screening for HPV (human papillomavirus)  -     Liquid-based pap, screening    Encounter for Papanicolaou smear for cervical cancer screening  -     Liquid-based pap, screening    Hypothyroidism, unspecified type  -     TSH, 3rd generation; Future  -     T4, free; Future    Nausea and vomiting during pregnancy  -     promethazine (PHENERGAN) 12 5 MG tablet; Take 1 tablet (12 5 mg total) by mouth every 6 (six) hours as needed for nausea or vomiting     44 y o  12w5d here for prenatal appointment  Routine prenatal  --Sanchez Chandler was welcomed and oriented to our practice      Lab work  --Blood type pending  --Offered genetic testing, appointment made with [facility]  --GCCT, Pap with HPV collected today    Hypothyroid  --for TSH and free T4 every trimester  --ordered today    Nausea  --Promethazine ordered today, will continue to monitor    For routine OB follow up

## 2019-10-15 NOTE — PATIENT INSTRUCTIONS
Pregnancy at [address] to [address]:   You are now at the end of your first trimester and entering your second trimester  Morning sickness usually goes away by this time  You may have other symptoms such as fatigue, frequent urination, and headaches  You may have gained between 2 to 4 pounds by now  DISCHARGE INSTRUCTIONS:   Return to the emergency department if:   · You have pain or cramping in your abdomen or low back  · You have heavy vaginal bleeding or clotting  · You pass material that looks like tissue or large clots  Collect the material and bring it with you  Contact your healthcare provider if:   · You cannot keep food or drinks down, and you are losing weight  · You have light bleeding  · You have chills or a fever  · You have vaginal itching, burning, or pain  · You have yellow, green, white, or foul-smelling vaginal discharge  · You have pain or burning when you urinate, less urine than usual, or pink or bloody urine  · You have questions or concerns about your condition or care  How to care for yourself at this stage of your pregnancy:   · Get plenty of rest   You may feel more tired than normal  You may need to take naps or go to bed earlier  · Manage nausea and vomiting  Avoid fatty and spicy foods  Eat small meals throughout the day instead of large meals  [place] may help to decrease nausea  Ask your healthcare provider about other ways of decreasing nausea and vomiting  · Eat a variety of healthy foods  Healthy foods include fruits, vegetables, whole-grain breads, low-fat dairy foods, beans, lean meats, and fish  Drink liquids as directed  Ask how much liquid to drink each day and which liquids are best for you  Limit caffeine to less than 200 milligrams each day  Limit your intake of fish to 2 servings each week  Choose fish low in mercury such as canned light tuna, shrimp, salmon, cod, or tilapia   Do not  eat fish high in mercury such as swordfish, tilefish, babita mackerel, and shark  · Take prenatal vitamins as directed  Your need for certain vitamins and minerals, such as folic acid, increases during pregnancy  Prenatal vitamins provide some of the extra vitamins and minerals you need  Prenatal vitamins may also help to decrease the risk of certain birth defects  · Do not smoke  If you smoke, it is never too late to quit  Smoking increases your risk of a miscarriage and other health problems during your pregnancy  Smoking can cause your baby to be born too early or weigh less at birth  Ask your healthcare provider for information if you need help quitting  · Do not drink alcohol  Alcohol passes from your body to your baby through the placenta  It can affect your baby's brain development and cause fetal alcohol syndrome (FAS)  FAS is a group of conditions that causes mental, behavior, and growth problems  · Talk to your healthcare provider before you take any medicines  Many medicines may harm your baby if you take them when you are pregnant  Do not take any medicines, vitamins, herbs, or supplements without first talking to your healthcare provider  Never use illegal or street drugs (such as marijuana or cocaine) while you are pregnant  Safety tips during pregnancy:   · Avoid hot tubs and saunas  Do not use a hot tub or sauna while you are pregnant, especially during your first trimester  Hot tubs and saunas may raise your baby's temperature and increase the risk of birth defects  · Avoid toxoplasmosis  This is an infection caused by eating raw meat or being around infected cat feces  It can cause birth defects, miscarriages, and other problems  Wash your hands after you touch raw meat  Make sure any meat is well-cooked before you eat it  Avoid raw eggs and unpasteurized milk  Use gloves or ask someone else to clean your cat's litter box while you are pregnant  Changes that are happening with your baby:   Your baby has fully formed fingernails and toenails  Your baby's heartbeat can now be heard  Ask your healthcare provider if you can listen to your baby's heartbeat  By week 14, your baby is over 4 inches long from the top of the head to the rump (baby's bottom)  Your baby weighs over 3 ounces  What you need to know about prenatal care:  During the first 28 weeks of your pregnancy, you will see your healthcare provider once a month  Prenatal care can help prevent problems during pregnancy and childbirth  Your healthcare provider will check your blood pressure and weight  You may also need any of the following:  · A urine test  may also be done to check for sugar and protein  These can be signs of gestational diabetes or infection  · Genetic disorders screening tests  may be offered to you  This screening test checks your baby's risk of genetic disorders such as Down syndrome  The screening test includes a blood test and ultrasound  · Your baby's heart rate  will be checked  © 2017 [address] Information is for End User's use only and may not be sold, redistributed or otherwise used for commercial purposes  All illustrations and images included in CareNotes® are the copyrighted property of [employer]  or Ed Chiang  The above information is an  only  It is not intended as medical advice for individual conditions or treatments  Talk to your doctor, nurse or pharmacist before following any medical regimen to see if it is safe and effective for you

## 2019-10-15 NOTE — PROGRESS NOTES
OB INTAKE INTERVIEW  * Pt presents for OB intake with madhuri Richards in network for ER registration  This is FOB # 2  Not new paternity  Fransico Garnett had child outside of this relationship which  at 1 months old due to SIDS  * Accompanied by: Jacob Richards  *  *Hx of  delivery prior to 36 weeks 6 days :No  *Last Menstrual Period: Pt's LMP was  2019  *Ultrasound date :2019   8w0d  *Estimated date of delivery: 2019   * confirmed by [address]  *Signs/Symptoms of Pregnancy   * Nausea, Fatigue   *constipation : Yes  Discussed diet and stool softeners   *headaches :No   *cramping/spotting:No   *PICA cravings :No    *Diabetes- if you answer yes, please order 1 hour GTT, 50g   *hx of GDM :No   *BMI >35 :No   *first degree relative with type 2 diabetes :No   *hx of PCOS :No   *current metformin use:No   *prior hx of LGA/macrosomia :No   *AMA with other risk factors: Yes, Increased BMI  *Hypertension- if you answer yes, please order preeclampsia labs including 24 hour urine protein   *Hx of chronic HTN :No   *hx of gestational HTN :No   *hx of preeclampsia, eclampsia, or HELLP syndrome :No    *Infection Screening-    *does the pt have a hx of MRSA? No   *if yes- please follow MRSA protocol and obtain a nasal swab for MRSA culture   *history of herpes? No    *Immunizations:   *influenza vaccine given today: Received at work today at [facility]  *discussed Tdap vaccine    *Interview education   *[facility] Pregnancy Essentials Book reviewed and discussed   *Handouts given:    *Baby and Me phone james guide    *Baby and Me support center    *[facility]     *discussed genetic testing: Yes     *appointment at [facility] made: Yes    *Prenatal lab work scripts provided including 1 hour Glucola   *Nurse/Family Partnership- pt may qualify :No    *I have these concerns about this prenatal patient: AMA, Increased BMI  Patient recently lost 105lb on Keto diet  Gaining weight very quickly   Requests nutrition referral  Referral placed  *Details that I feel the provider should be aware of: Same as above  PN1 visit scheduled  The patient was oriented to our practice and all questions were answered      Interviewed by: KYLIE Aguero

## 2019-10-16 LAB
C TRACH DNA SPEC QL NAA+PROBE: NEGATIVE
N GONORRHOEA DNA SPEC QL NAA+PROBE: NEGATIVE

## 2019-10-17 ENCOUNTER — TELEPHONE (OUTPATIENT)
Dept: OBGYN CLINIC | Facility: CLINIC | Age: 39
End: 2019-10-17

## 2019-10-17 DIAGNOSIS — Z34.01 ENCOUNTER FOR SUPERVISION OF NORMAL FIRST PREGNANCY IN FIRST TRIMESTER: Primary | ICD-10-CM

## 2019-10-17 LAB
HPV HR 12 DNA CVX QL NAA+PROBE: NEGATIVE
HPV16 DNA CVX QL NAA+PROBE: NEGATIVE
HPV18 DNA CVX QL NAA+PROBE: NEGATIVE

## 2019-10-17 NOTE — TELEPHONE ENCOUNTER
Pt is currently pregnant  Pt was seen on 10/15 for ob interview and 1st prenatal  Pt is currently 12w 7d  Pt still did not get a call from [facility]   Pt was told to call if she did not receive a call from [facility] by yesterday please advise

## 2019-10-17 NOTE — TELEPHONE ENCOUNTER
Called to [facility] to inform patient is anxious to schedule as she is already 13 weeks  They said they will call her

## 2019-10-18 ENCOUNTER — CONSULT (OUTPATIENT)
Dept: PERINATAL CARE | Facility: CLINIC | Age: 39
End: 2019-10-18
Payer: COMMERCIAL

## 2019-10-18 ENCOUNTER — ROUTINE PRENATAL (OUTPATIENT)
Dept: PERINATAL CARE | Facility: CLINIC | Age: 39
End: 2019-10-18
Payer: COMMERCIAL

## 2019-10-18 VITALS
BODY MASS INDEX: 43.98 KG/M2 | DIASTOLIC BLOOD PRESSURE: 84 MMHG | SYSTOLIC BLOOD PRESSURE: 111 MMHG | WEIGHT: 239 LBS | HEART RATE: 103 BPM | HEIGHT: 62 IN

## 2019-10-18 DIAGNOSIS — Z98.891 PREVIOUS CESAREAN SECTION: ICD-10-CM

## 2019-10-18 DIAGNOSIS — O09.521 MULTIGRAVIDA OF ADVANCED MATERNAL AGE IN FIRST TRIMESTER: ICD-10-CM

## 2019-10-18 DIAGNOSIS — O09.521 ADVANCED MATERNAL AGE IN MULTIGRAVIDA, FIRST TRIMESTER: Primary | ICD-10-CM

## 2019-10-18 DIAGNOSIS — Z31.5 ENCOUNTER FOR PROCREATIVE GENETIC COUNSELING: ICD-10-CM

## 2019-10-18 DIAGNOSIS — Z36.82 ENCOUNTER FOR (NT) NUCHAL TRANSLUCENCY SCAN: ICD-10-CM

## 2019-10-18 DIAGNOSIS — Z3A.13 13 WEEKS GESTATION OF PREGNANCY: Primary | ICD-10-CM

## 2019-10-18 DIAGNOSIS — E03.9 HYPOTHYROIDISM, UNSPECIFIED TYPE: ICD-10-CM

## 2019-10-18 DIAGNOSIS — Z13.71 TESTING OF FEMALE FOR GENETIC DISEASE CARRIER STATUS: ICD-10-CM

## 2019-10-18 DIAGNOSIS — Z34.01 ENCOUNTER FOR SUPERVISION OF NORMAL FIRST PREGNANCY IN FIRST TRIMESTER: ICD-10-CM

## 2019-10-18 DIAGNOSIS — O35.2XX0 HEREDITARY DISEASE IN FAMILY POSSIBLY AFFECTING FETUS, AFFECTING MANAGEMENT OF MOTHER IN PREGNANCY, SINGLE OR UNSPECIFIED FETUS: ICD-10-CM

## 2019-10-18 PROBLEM — J45.909 ASTHMA: Status: ACTIVE | Noted: 2019-10-18

## 2019-10-18 PROBLEM — O99.210 OBESITY AFFECTING PREGNANCY: Status: ACTIVE | Noted: 2019-10-18

## 2019-10-18 PROCEDURE — 76813 OB US NUCHAL MEAS 1 GEST: CPT | Performed by: OBSTETRICS & GYNECOLOGY

## 2019-10-18 PROCEDURE — 99242 OFF/OP CONSLTJ NEW/EST SF 20: CPT | Performed by: OBSTETRICS & GYNECOLOGY

## 2019-10-18 PROCEDURE — 76801 OB US < 14 WKS SINGLE FETUS: CPT | Performed by: OBSTETRICS & GYNECOLOGY

## 2019-10-18 NOTE — LETTER
2019     Lorie Barahona,  [address] [facility] [address] [address] [place] [address]    Patient: Milena Maldonado   YOB: 1980   Date of Visit: 10/18/2019       Dear Dr Mounika Urban: Thank you for referring Milena Maldonado to me for evaluation  Below are my notes for this consultation  If you have questions, please do not hesitate to call me  I look forward to following your patient along with you  Sincerely,        Hafsa Mayo MD        CC: No Recipients  Hafsa Mayo MD  10/18/2019  2:48 PM  Sign at close encounter  [address]: Ms Angela Hardin was seen today at 13w1d for nuchal translucency ultrasound  See ultrasound report under "OB Procedures" tab  My recommendations are as follows:    1  We reviewed the availability of genetic screening, as well as diagnostic testing, which are available to all pregnant women  We reviewed limitations, risks, and benefits of screening and testing  She elected to proceed with NIPT, which was drawn in our office today  She does not wish to pursue diagnostic testing at this time  She had genetic counseling following today's appointment, which will be separately reported  2   A detailed anatomic survey as well as transvaginal cervical length screening are recommended between 18-22 weeks gestation  3    Obesity in pregnancy (defined as body mass index > 30 kg/m2) is associated with an increased risk of several pregnancy complications, including hypertensive disorders, diabetes, abnormal fetal growth, fetal malformations  The risk of  delivery is also increased, as are wound complications in the event of  delivery  A healthy diet and exercise, as well as appropriate gestational weight gain (no more than 11-20 pounds) can help reduce risk of these complications  150 minutes of moderate exercise per week is recommended for all pregnant women  Nutrition counseling is also available if desired  Early screening for gestational diabetes is recommended (she has an order and is planning to complete soon), as well as routine re-screening at 24-28 weeks if early screening results are normal   fetal surveillance is also recommended as follows:BMI 30-40:  Evaluation of fetal growth at 32 weeks gestation  BMI >40: Evaluation of fetal growth at 28, 34 weeks gestation, as well as antepartum fetal surveillance once weekly beginning at 36 weeks gestation  4  Advanced Maternal Age (AMA) is defined as maternal age 28 or greater at [place] [address]  Advanced maternal age is associated with an increased risk of several pregnancy outcomes, including aneuploidy/genetic syndromes, poor fetal growth, stillbirth, maternal hypertensive disorders, and gestational diabetes  Despite these increased risks, many women of advanced maternal age have normal, healthy pregnancy outcomes, particularly if they have no co-existing medical conditions  Genetic counseling was performed today in our office  Risk of adverse outcomes is proportional to patient age  For women age 36 and older, we recommend serial ultrasounds for fetal growth (at 29 and 34 weeks), as well as initiating antepartum fetal surveillance weekly at 36 weeks gestation  5  Ms Osbaldo Quinones has a history of two prior  deliveries  Most women with one or two prior low-transverse uterine incisions are candidates for trial of labor after  (TOLAC)  The majority of women attempting a trial of labor after  will succeed, but individual chance of success varies based on individual patient factors, including the circumstances of prior  delivery/deliveries  The most significant, but fortunately rare, risk of TOLAC is uterine rupture, which occurs in 0 5-0 9% of women attempting a TOLAC, and is associated with risks of maternal and fetal morbidity and mortality   Decision regarding mode of delivery (planned repeat  versus TOLAC) should be made after counseling by the patient and her obstetric care provider  Individual chance of success can be estimated using a predictive tool ( calculator), available at [email or web address] [email or web address] html  Her planned mode of delivery at present is repeat   6  We reviewed her history of hypothyroidism, and management in pregnancy  The goal in pregnancy is to maintain a euthyroid maternal state, which is important for maternal health and fetal development  Thyroid function should be assessed at least each trimester using thyroid stimulating hormone (TSH) levels  She has a lab requisition for first trimester thyroid function that she was reminded to complete  After medication dose-adjustments, labs should be repeated every 4-6 weeks until euthyroid  Goal TSH levels in pregnancy recommended by the American Thyroid Association are 0 1-2 5 mIU/L (first trimester), 0 2-3 0 mIU/L (second trimester) and 0 3-3 0 mIU/L (third trimester)  Levothyroxine dose should be titrated to achieve these trimester-specific ranges  It is typical for women to require a dose increase of levothyroxine during pregnancy, often with a reduction needed postpartum  Although women with overt, uncontrolled hypothyroidism are at risk for adverse pregnancy outcomes (including miscarriage, preeclampsia,  birth, placental abruption, and stillbirth), adequate thyroid hormone replacement in pregnancy minimizes the risk of adverse outcomes  For patients who are well-controlled in pregnancy, no additional fetal surveillance is indicated beyond usual prenatal care  7  We reviewed her history of seasonal mild intermittent asthma  Asthma complicates 4-4% of pregnancies  The majority of women with asthma experience stability or improvement in symptoms in pregnancy, while approximately 1/3 experience worsening   Need for a rescue inhaler more than twice weekly indicates suboptimal asthma control and need for escalation in therapy  The majority of asthma medications are safe for pregnancy, and disease control is important for maternal and fetal health in pregnancy  If her symptoms remain well-controlled in pregnancy, no additional obstetric surveillance is indicated  However, if control is poor, there is a risk of fetal growth restriction, and evaluation of fetal growth in the second half of pregnancy is likely warranted  Prevention of respiratory morbidity is particularly important in pregnancy  Annual influenza recommendation is recommended (she has already been vaccinated this season), as is pneumococcal vaccination if not performed previously  8  The use of low dose aspirin in pregnancy (81-162mg) is recommended in women with a high risk, or multiple moderate risk factors for preeclampsia  Aspirin therapy should be initiated between 12-28 weeks gestation, and is most effective if started prior to 16 weeks gestation, and continued until delivery  Low dose aspirin in pregnancy has been shown to reduce the incidence of preeclampsia in women with risk factors, and has been shown to be safe and without significant maternal or fetal risk  In light of her risk factors which include maternal age and BMI, I recommend initiating aspirin therapy  I did not have the opportunity to discuss this recommendation with her, if you could kindly offer at her next appointment that would be appreciated       Please don't hesitate to contact our office with any concerns or questions     -David Paulino MD

## 2019-10-18 NOTE — PROGRESS NOTES
Janessa 21 test drawn on patient in right hand  Patient called [employer]  to check lab costs prior to blood draw   Patient verbalizes understanding about test

## 2019-10-18 NOTE — PROGRESS NOTES
[address]: Ms Radha Aguilera was seen today at 13w1d for nuchal translucency ultrasound  See ultrasound report under "OB Procedures" tab  My recommendations are as follows:    1  We reviewed the availability of genetic screening, as well as diagnostic testing, which are available to all pregnant women  We reviewed limitations, risks, and benefits of screening and testing  She elected to proceed with NIPT, which was drawn in our office today  She does not wish to pursue diagnostic testing at this time  She had genetic counseling following today's appointment, which will be separately reported  2   A detailed anatomic survey as well as transvaginal cervical length screening are recommended between 18-22 weeks gestation  3    Obesity in pregnancy (defined as body mass index > 30 kg/m2) is associated with an increased risk of several pregnancy complications, including hypertensive disorders, diabetes, abnormal fetal growth, fetal malformations  The risk of  delivery is also increased, as are wound complications in the event of  delivery  A healthy diet and exercise, as well as appropriate gestational weight gain (no more than 11-20 pounds) can help reduce risk of these complications  150 minutes of moderate exercise per week is recommended for all pregnant women  Nutrition counseling is also available if desired  Early screening for gestational diabetes is recommended (she has an order and is planning to complete soon), as well as routine re-screening at 24-28 weeks if early screening results are normal   fetal surveillance is also recommended as follows:BMI 30-40:  Evaluation of fetal growth at 32 weeks gestation  BMI >40: Evaluation of fetal growth at 28, 34 weeks gestation, as well as antepartum fetal surveillance once weekly beginning at 36 weeks gestation  4  Advanced Maternal Age (AMA) is defined as maternal age 28 or greater at [place] [address]   Advanced maternal age is associated with an increased risk of several pregnancy outcomes, including aneuploidy/genetic syndromes, poor fetal growth, stillbirth, maternal hypertensive disorders, and gestational diabetes  Despite these increased risks, many women of advanced maternal age have normal, healthy pregnancy outcomes, particularly if they have no co-existing medical conditions  Genetic counseling was performed today in our office  Risk of adverse outcomes is proportional to patient age  For women age 36 and older, we recommend serial ultrasounds for fetal growth (at 29 and 34 weeks), as well as initiating antepartum fetal surveillance weekly at 36 weeks gestation  5  Ms Jeannette Haynes has a history of two prior  deliveries  Most women with one or two prior low-transverse uterine incisions are candidates for trial of labor after  (TOLAC)  The majority of women attempting a trial of labor after  will succeed, but individual chance of success varies based on individual patient factors, including the circumstances of prior  delivery/deliveries  The most significant, but fortunately rare, risk of TOLAC is uterine rupture, which occurs in 0 5-0 9% of women attempting a TOLAC, and is associated with risks of maternal and fetal morbidity and mortality  Decision regarding mode of delivery (planned repeat  versus TOLAC) should be made after counseling by the patient and her obstetric care provider  Individual chance of success can be estimated using a predictive tool ( calculator), available at [email or web address]/VGBirthCalc/[email or web address] html  Her planned mode of delivery at present is repeat   6  We reviewed her history of hypothyroidism, and management in pregnancy  The goal in pregnancy is to maintain a euthyroid maternal state, which is important for maternal health and fetal development   Thyroid function should be assessed at least each trimester using thyroid stimulating hormone (TSH) levels  She has a lab requisition for first trimester thyroid function that she was reminded to complete  After medication dose-adjustments, labs should be repeated every 4-6 weeks until euthyroid  Goal TSH levels in pregnancy recommended by the American Thyroid Association are 0 1-2 5 mIU/L (first trimester), 0 2-3 0 mIU/L (second trimester) and 0 3-3 0 mIU/L (third trimester)  Levothyroxine dose should be titrated to achieve these trimester-specific ranges  It is typical for women to require a dose increase of levothyroxine during pregnancy, often with a reduction needed postpartum  Although women with overt, uncontrolled hypothyroidism are at risk for adverse pregnancy outcomes (including miscarriage, preeclampsia,  birth, placental abruption, and stillbirth), adequate thyroid hormone replacement in pregnancy minimizes the risk of adverse outcomes  For patients who are well-controlled in pregnancy, no additional fetal surveillance is indicated beyond usual prenatal care  7  We reviewed her history of seasonal mild intermittent asthma  Asthma complicates 1-0% of pregnancies  The majority of women with asthma experience stability or improvement in symptoms in pregnancy, while approximately 1/3 experience worsening  Need for a rescue inhaler more than twice weekly indicates suboptimal asthma control and need for escalation in therapy  The majority of asthma medications are safe for pregnancy, and disease control is important for maternal and fetal health in pregnancy  If her symptoms remain well-controlled in pregnancy, no additional obstetric surveillance is indicated  However, if control is poor, there is a risk of fetal growth restriction, and evaluation of fetal growth in the second half of pregnancy is likely warranted  Prevention of respiratory morbidity is particularly important in pregnancy   Annual influenza recommendation is recommended (she has already been vaccinated this season), as is pneumococcal vaccination if not performed previously  8  The use of low dose aspirin in pregnancy (81-162mg) is recommended in women with a high risk, or multiple moderate risk factors for preeclampsia  Aspirin therapy should be initiated between 12-28 weeks gestation, and is most effective if started prior to 16 weeks gestation, and continued until delivery  Low dose aspirin in pregnancy has been shown to reduce the incidence of preeclampsia in women with risk factors, and has been shown to be safe and without significant maternal or fetal risk  In light of her risk factors which include maternal age and BMI, I recommend initiating aspirin therapy  I did not have the opportunity to discuss this recommendation with her, if you could kindly offer at her next appointment that would be appreciated       Please don't hesitate to contact our office with any concerns or questions     -Homer Delarosa MD

## 2019-10-20 LAB
LAB AP GYN PRIMARY INTERPRETATION: NORMAL
Lab: NORMAL

## 2019-10-22 ENCOUNTER — LAB (OUTPATIENT)
Dept: LAB | Facility: CLINIC | Age: 39
End: 2019-10-22
Payer: COMMERCIAL

## 2019-10-22 DIAGNOSIS — Z13.71 TESTING OF FEMALE FOR GENETIC DISEASE CARRIER STATUS: ICD-10-CM

## 2019-10-22 DIAGNOSIS — Z34.81 PRENATAL CARE, SUBSEQUENT PREGNANCY, FIRST TRIMESTER: ICD-10-CM

## 2019-10-22 DIAGNOSIS — O09.521 ADVANCED MATERNAL AGE IN MULTIGRAVIDA, FIRST TRIMESTER: ICD-10-CM

## 2019-10-22 DIAGNOSIS — E03.9 HYPOTHYROIDISM, UNSPECIFIED TYPE: ICD-10-CM

## 2019-10-22 LAB
BACTERIA UR QL AUTO: NORMAL /HPF
BASOPHILS # BLD AUTO: 0.02 THOUSANDS/ΜL (ref 0–0.1)
BASOPHILS NFR BLD AUTO: 0 % (ref 0–1)
BILIRUB UR QL STRIP: NEGATIVE
CLARITY UR: NORMAL
COLOR UR: YELLOW
EOSINOPHIL # BLD AUTO: 0.28 THOUSAND/ΜL (ref 0–0.61)
EOSINOPHIL NFR BLD AUTO: 3 % (ref 0–6)
ERYTHROCYTE [DISTWIDTH] IN BLOOD BY AUTOMATED COUNT: 17.2 % (ref 11.6–15.1)
GLUCOSE 1H P 50 G GLC PO SERPL-MCNC: 121 MG/DL
GLUCOSE UR STRIP-MCNC: NEGATIVE MG/DL
HBV SURFACE AG SER QL: NORMAL
HCT VFR BLD AUTO: 38.4 % (ref 34.8–46.1)
HGB BLD-MCNC: 11.7 G/DL (ref 11.5–15.4)
HGB UR QL STRIP.AUTO: NEGATIVE
IMM GRANULOCYTES # BLD AUTO: 0.03 THOUSAND/UL (ref 0–0.2)
IMM GRANULOCYTES NFR BLD AUTO: 0 % (ref 0–2)
KETONES UR STRIP-MCNC: NEGATIVE MG/DL
LEUKOCYTE ESTERASE UR QL STRIP: NEGATIVE
LYMPHOCYTES # BLD AUTO: 2.25 THOUSANDS/ΜL (ref 0.6–4.47)
LYMPHOCYTES NFR BLD AUTO: 27 % (ref 14–44)
MCH RBC QN AUTO: 25.7 PG (ref 26.8–34.3)
MCHC RBC AUTO-ENTMCNC: 30.5 G/DL (ref 31.4–37.4)
MCV RBC AUTO: 84 FL (ref 82–98)
MONOCYTES # BLD AUTO: 0.34 THOUSAND/ΜL (ref 0.17–1.22)
MONOCYTES NFR BLD AUTO: 4 % (ref 4–12)
NEUTROPHILS # BLD AUTO: 5.52 THOUSANDS/ΜL (ref 1.85–7.62)
NEUTS SEG NFR BLD AUTO: 66 % (ref 43–75)
NITRITE UR QL STRIP: NEGATIVE
NON-SQ EPI CELLS URNS QL MICRO: NORMAL /HPF
NRBC BLD AUTO-RTO: 0 /100 WBCS
PH UR STRIP.AUTO: 6.5 [PH]
PLATELET # BLD AUTO: 273 THOUSANDS/UL (ref 149–390)
PMV BLD AUTO: 11.3 FL (ref 8.9–12.7)
PROT UR STRIP-MCNC: NEGATIVE MG/DL
RBC # BLD AUTO: 4.56 MILLION/UL (ref 3.81–5.12)
RBC #/AREA URNS AUTO: NORMAL /HPF
RUBV IGG SERPL IA-ACNC: 168.4 IU/ML
SP GR UR STRIP.AUTO: 1.02 (ref 1–1.03)
T4 FREE SERPL-MCNC: 0.84 NG/DL (ref 0.76–1.46)
TSH SERPL DL<=0.05 MIU/L-ACNC: 5.36 UIU/ML (ref 0.36–3.74)
UROBILINOGEN UR QL STRIP.AUTO: 0.2 E.U./DL
WBC # BLD AUTO: 8.44 THOUSAND/UL (ref 4.31–10.16)
WBC #/AREA URNS AUTO: NORMAL /HPF

## 2019-10-22 PROCEDURE — 87086 URINE CULTURE/COLONY COUNT: CPT

## 2019-10-22 PROCEDURE — 82950 GLUCOSE TEST: CPT

## 2019-10-22 PROCEDURE — 84443 ASSAY THYROID STIM HORMONE: CPT

## 2019-10-22 PROCEDURE — 36415 COLL VENOUS BLD VENIPUNCTURE: CPT

## 2019-10-22 PROCEDURE — 81001 URINALYSIS AUTO W/SCOPE: CPT

## 2019-10-22 PROCEDURE — 80081 OBSTETRIC PANEL INC HIV TSTG: CPT

## 2019-10-22 PROCEDURE — 81401 MOPATH PROCEDURE LEVEL 2: CPT

## 2019-10-22 PROCEDURE — 81243 FMR1 GEN ALY DETC ABNL ALLEL: CPT

## 2019-10-22 PROCEDURE — 84439 ASSAY OF FREE THYROXINE: CPT

## 2019-10-23 ENCOUNTER — TELEPHONE (OUTPATIENT)
Dept: OBGYN CLINIC | Facility: CLINIC | Age: 39
End: 2019-10-23

## 2019-10-23 DIAGNOSIS — E03.9 HYPOTHYROIDISM, UNSPECIFIED TYPE: Primary | ICD-10-CM

## 2019-10-23 PROBLEM — O09.91 SUPERVISION OF HIGH RISK PREGNANCY IN FIRST TRIMESTER: Status: ACTIVE | Noted: 2019-10-23

## 2019-10-23 LAB
ABO GROUP BLD: NORMAL
BACTERIA UR CULT: NORMAL
BLD GP AB SCN SERPL QL: NEGATIVE
HIV 1+2 AB+HIV1 P24 AG SERPL QL IA: NORMAL
RH BLD: POSITIVE
RPR SER QL: NORMAL
SPECIMEN EXPIRATION DATE: NORMAL

## 2019-10-23 RX ORDER — LEVOTHYROXINE SODIUM 0.07 MG/1
75 TABLET ORAL DAILY
Qty: 30 TABLET | Refills: 1 | Status: SHIPPED | OUTPATIENT
Start: 2019-10-23 | End: 2020-02-28 | Stop reason: SDUPTHER

## 2019-10-23 NOTE — TELEPHONE ENCOUNTER
----- Message from Pepper Torrez MD sent at 10/23/2019  9:09 AM EDT -----  Please let Enzo Grimaldo know that her TSH was elevated, as expected in pregnancy  Sent new Rx for 75 mcg levothyroxine to her pharmacy    Plan to repeat TSH in 4 weeks to determine if need to readjust   Thank you! -AMM

## 2019-10-23 NOTE — PROGRESS NOTES
Lab Results   Component Value Date    [id number] 5 360 (H) 10/22/2019     Plan to titrate up levothyroxine from 50 to 75 mcg  Plan to re-measure TSH in 4 weeks to allow for adjustments as needed

## 2019-10-23 NOTE — RESULT ENCOUNTER NOTE
Please let Mary Mccallum know that her TSH was elevated, as expected in pregnancy  Sent new Rx for 75 mcg levothyroxine to her pharmacy    Plan to repeat TSH in 4 weeks to determine if need to readjust   Thank you! -AMM

## 2019-10-25 NOTE — PROGRESS NOTES
Genetic Counseling   High-Risk Gestation Note    Appointment Date:  10/25/2019  Referred By: Misha Eduardo,*  YOB: 1980  Partner:  Avel Bailey     Indication for Visit:  advanced maternal age    Pregnancy History:   Estimated Date of Delivery: 20  Estimated Gestational Age: 13w1d    Genetic Counseling: Bernardino Peres is a 44year old female who is here to discuss maternal age related risks for aneuploidy as well as a family history of learning differences and ADD/ADHD  Issues Discussed:  average population risk- 3-4% in the average pregnancy of serious condition or birth defect  2-3% risk of intellectual disabilites  Not all detected by prenatal testing  Chromosomal: non-disjunction-  for Down syndrome and  at age 36 at delivery  Clinical course and variability of ADD/ADHD and learning differences  Mode of inheritance/mechanism:  Multifactorial inheritance for ADD/ADHD and leanring differnces  Risk to future pregnancies:  up to 50% chance for ADD/ADHD and learning differences  Options Discussed:  Amniocentesis-risks and limitations discussed  CVS-Risks and limitations discussed  Ethnic screening discussed-clinical and genetic basis of SMA, Fragile X, hemoglobinopathies  Variability and treatment addressed  Level II ultrasound to screen for structural anomalies  Serum AFP screen recommended at 16-18 weeks to check for open neural tube defects  Cell free fetal DNA testing  Additional Information / Impression:  Bernardino Peres is a 44 y o  female who presented for genetic counseling to discuss maternal age related risks as noted above and a family history of ADD/ADHD and learning differences  The risks, benefits, and limitations of amniocentesis were discussed with the patient  Amniocentesis is performed under direct real time ultrasound visualization to avoid both the fetus and the placenta    Once amniotic fluid is withdrawn, laboratory analysis is performed and amniotic fluid alpha-fetoprotein, as well as chromosome analysis is undertaken  The risk of genetic amniocentesis includes, but is not limited to less than 1 in 300 pregnancy loss rate or  delivery rate if 23 weeks or greater, infection, bleeding, rupture of membranes, failure of cells to grow, karyotype error, laboratory error, etc   Occasionally a repeat amniocentesis is necessary due to cell culture failure  Chromosome analysis from amniocentesis is 99 9% accurate and alpha-fetoprotein analysis can detect approximately 95% of open neural tube defects  Chorionic villus sampling (CVS) is another diagnostic testing option that is available earlier than amniocentesis, between 10-14 weeks gestation  Like amniocentesis, CVS is 99% accurate for detecting chromosomal problems  Unlike amniocentesis, CVS cannot detect alpha-fetoprotein levels in order to determine the risk for open neural tube defects  MSAFP testing would need to be performed at 15-20 weeks gestation for this purpose  The risk of CVS includes, but is not limited to, less than a 1 in 300 risk for pregnancy loss  There is also a 1% risk for maternal cell contamination and cell culture failure, in which case the CVS would need to be followed-up with amniocentesis  We reviewed the testing option of cell free fetal DNA screening (also known as noninvasive prenatal testing or NIPT)  We discussed that it is a serum test to identify fragments of fetal DNA in maternal blood  We reviewed the benefits and limitations of cell free fetal DNA screening in detecting Down syndrome, Trisomy 13, Trisomy 25 and sex chromosome aneuploidies  We also discussed that cell free fetal DNA screening does not detect additional chromosomal abnormalities and the possibility of a failed test result  As cell free fetal DNA screening does not detect open neural tube defects, MSAFP screening is available at 15-20 weeks gestation      We reviewed that level II anatomy ultrasound is typically performed at approximately 20 weeks gestation  Level II ultrasound evaluation is between 60-80% accurate in detecting major physical birth defects and variations in fetal development that may be associated with chromosome abnormalities  Level II ultrasound evaluation is not able to detect all birth defects or health problems  After discussing the available prenatal screening and testing options Carlos James elected to pursue cell free fetal DNA screening  Her blood was drawn immediately after the counseling session for Avel Hughesilling  Results take approximately 7-10 days  The patient declined CVS and amniocentesis secondary to procedural related complications  She may reconsider diagnostic testing should the cell free fetal DNA screening come back abnormal   Carlos James is also planning on pursuing MSAFP screening and Level II ultrasound at the appropriate times  Carlos James reports a niece who has progressive vision loss and is now legally blind  She is not aware of a specific diagnosis, thus we discussed that without further information on potential etiology an accurate risk to the current pregnancy cannot be determined  She also reports histories of mental health concerns, ADD/ADHD and learning differences in several of her family members including her daughter and son  Linda Conley also has a personal history of attention issues and learning differences  We reviewed the possible etiologies of mental health concerns, ADD/ADHD and learning differences, including multifactorial and genetic  It may be associated with a chromosomal abnormality or single gene disorder  Without further information as to the potential etiology an accurate risk to the current pregnancy cannot be provided, but could be up to 50% as autosomal dominant inheritance cannot be ruled  The benefits and limitations of Fragile X carrier screening were discussed    The patient elected to pursue this testing following our appointment  Further review of family history for the patient and her partner was noncontributory  The family history was not significant for other genetic diseases or disorders, intellectual disability, birth defects, fetal loss, or consanguinity  Patient reports being of Polish/[place]se/Polish decent and that her partner is of [place]/Danish decent  She denies either of them having known Ashkenazi Jew ancestry  The patient had negative cystic fibrosis carrier screening in 2012 during a previous pregnancy  We reviewed that her CBC showed a low MCV and MCH which can be evidence of iron deficiency anemia, or that she is a carrier of a thalassemia  Iron studies and a hemoglobin electrophoresis would be needed to clarify the cause of the decreased values, which can be ordered by her OB  The benefits and limitations of Spinal muscular atrophy (SMA), and expanded carrier screening was discussed  The patient elected to pursue SMA carrier screening and declined expanded carrier screening at this time  Labslips for Fragile X and SMA carrier screening were provided to the patient and she will be contacted for her blood draw once approval is obtained  Lastly, we discussed the fact that everyone in the general population regardless of age, family history, or medical background has approximately a 3-5% risk of having a child with some type of congenital anomaly, genetic disease or intellectual disability  Currently there are no tests available to rule out all birth defects or health problems  Ludlow was provided with take home literature and our contact information  I encouraged her to call with any questions or concerns  Time spent with Genetic Counselor: 60 minutes    Plan / Tests Ordered:  1) Patient declined CVS, amniocentesis, expanded carrier screening  2) Patient elected cell free fetal DNA testing - [id number] drawn following our appointment    3) Patient elected Fragile X and SMA carrier screening following our appointment  4) MSAFP screening at 15-20 weeks gestation  5) Level II anatomy ultrasound at approximately 20 weeks gestation  6) Iron studies and hemoglobin electrophoresis to be done by referring OB

## 2019-10-28 ENCOUNTER — TELEPHONE (OUTPATIENT)
Dept: PERINATAL CARE | Facility: CLINIC | Age: 39
End: 2019-10-28

## 2019-10-28 NOTE — TELEPHONE ENCOUNTER
Telephone call to patient number listed on communication consent  Voicemail had 1st and last name identifier  Left detailed message reporting cell free fetal DNA test results that were screen negative for trisomy 21, 18 and 13  Patient advised to expect MSAFP paperwork  Patient encouraged to call back with any questions including fetal gender      10/30/19- [facility] paperwork completed and mailed to patient

## 2019-10-30 LAB
ANNOTATION COMMENT IMP: NORMAL
COMMENTX: (FRAGILE X): NORMAL
ETHNIC BACKGROUND STATED: NORMAL
FMR1 GENE CGG RPT BLD/T QL: NORMAL
REF LAB TEST METHOD: NORMAL
SL AMB CARRIER DETECTION RATE: NORMAL
SL AMB CLIENT SPECIMEN ID: NORMAL
SL AMB CLINICAL DATA: NORMAL
SL AMB DISCLAIMER: NORMAL
SL AMB ELECTONICALLY SIGNED BY: NORMAL
SL AMB GENETIC COUNSELOR: NORMAL
SL AMB REFERENCES: NORMAL
SL AMB SPECIMEN(S) RECEIVED: NORMAL
SMN1 GENE MUT ANL BLD/T: NORMAL
SMN1 GENE MUT ANL BLD/T: NORMAL
SPECIMEN SOURCE: NORMAL

## 2019-10-31 NOTE — RESULT ENCOUNTER NOTE
[facility] staff, I've reviewed this SMA and fragile X screening result which is normal, can you confirm she received my results message sent via [employer]? Thank you    Abdirashid Ramírez MD

## 2019-11-06 ENCOUNTER — HOSPITAL ENCOUNTER (EMERGENCY)
Facility: HOSPITAL | Age: 39
Discharge: HOME | End: 2019-11-06
Attending: EMERGENCY MEDICINE
Payer: COMMERCIAL

## 2019-11-06 ENCOUNTER — APPOINTMENT (EMERGENCY)
Dept: RADIOLOGY | Facility: HOSPITAL | Age: 39
End: 2019-11-06
Payer: COMMERCIAL

## 2019-11-06 VITALS
DIASTOLIC BLOOD PRESSURE: 90 MMHG | RESPIRATION RATE: 20 BRPM | WEIGHT: 244.05 LBS | BODY MASS INDEX: 44.64 KG/M2 | TEMPERATURE: 98 F | OXYGEN SATURATION: 98 % | HEART RATE: 110 BPM | SYSTOLIC BLOOD PRESSURE: 159 MMHG

## 2019-11-06 DIAGNOSIS — J06.9 VIRAL URI WITH COUGH: Primary | ICD-10-CM

## 2019-11-06 PROCEDURE — 71046 X-RAY EXAM CHEST 2 VIEWS: CPT

## 2019-11-06 PROCEDURE — 99283 EMERGENCY DEPT VISIT LOW MDM: CPT

## 2019-11-06 PROCEDURE — 99284 EMERGENCY DEPT VISIT MOD MDM: CPT | Performed by: EMERGENCY MEDICINE

## 2019-11-06 RX ORDER — ACETAMINOPHEN 325 MG/1
975 TABLET ORAL ONCE
Status: COMPLETED | OUTPATIENT
Start: 2019-11-06 | End: 2019-11-06

## 2019-11-06 RX ADMIN — ACETAMINOPHEN 975 MG: 325 TABLET ORAL at 11:13

## 2019-11-06 NOTE — ED ATTENDING ATTESTATION
2019  Devan Costa DO, saw and evaluated the patient  I have discussed the patient with the resident/non-physician practitioner and agree with the resident's/non-physician practitioner's findings, Plan of Care, and MDM as documented in the resident's/non-physician practitioner's note, except where noted  All available labs and Radiology studies were reviewed  I was present for key portions of any procedure(s) performed by the resident/non-physician practitioner and I was immediately available to provide assistance  At this point I agree with the current assessment done in the Emergency Department  I have conducted an independent evaluation of this patient a history and physical is as follows:    39 , 15 weeks gestation, yof with cough, sore throat, rhinorrhea, congestion  Pt believes she has bronchitis  No abd/pelvic pain, no vaginal bleeding or discharge  No dysuria  Past Medical History:   Diagnosis Date    Abnormal Pap smear of cervix     Anemia     Disease of thyroid gland     Fibroid     Was told during last pregnancy had fibroid  Asymtomtic    Hypothyroidism     Kidney stone     Years ago   Had during last pregnancy    Varicella     Patient did at child     /80   Pulse 101   Temp 98 °F (36 7 °C) (Oral)   Resp 20   Wt 111 kg (244 lb 0 8 oz)   LMP 2019 (Exact Date)   SpO2 95%   BMI 44 64 kg/m²   NAD, pharynx, coughing on exam, CTA, , abd soft/NT, ext [facility]              ED Course         Critical Care Time  Procedures

## 2019-11-07 ENCOUNTER — APPOINTMENT (OUTPATIENT)
Dept: LAB | Facility: HOSPITAL | Age: 39
End: 2019-11-07
Attending: OBSTETRICS & GYNECOLOGY
Payer: COMMERCIAL

## 2019-11-07 ENCOUNTER — TRANSCRIBE ORDERS (OUTPATIENT)
Dept: LAB | Facility: HOSPITAL | Age: 39
End: 2019-11-07

## 2019-11-07 DIAGNOSIS — Z36.9 UNSPECIFIED ANTENATAL SCREENING: ICD-10-CM

## 2019-11-07 DIAGNOSIS — Z33.1 PREGNANT STATE, INCIDENTAL: ICD-10-CM

## 2019-11-07 DIAGNOSIS — Z33.1 PREGNANT STATE, INCIDENTAL: Primary | ICD-10-CM

## 2019-11-07 PROCEDURE — 36415 COLL VENOUS BLD VENIPUNCTURE: CPT

## 2019-11-07 PROCEDURE — 82105 ALPHA-FETOPROTEIN SERUM: CPT

## 2019-11-08 ENCOUNTER — ROUTINE PRENATAL (OUTPATIENT)
Dept: OBGYN CLINIC | Facility: CLINIC | Age: 39
End: 2019-11-08

## 2019-11-08 VITALS — DIASTOLIC BLOOD PRESSURE: 88 MMHG | SYSTOLIC BLOOD PRESSURE: 126 MMHG | BODY MASS INDEX: 44.81 KG/M2 | WEIGHT: 245 LBS

## 2019-11-08 DIAGNOSIS — O09.522 MULTIGRAVIDA OF ADVANCED MATERNAL AGE IN SECOND TRIMESTER: ICD-10-CM

## 2019-11-08 DIAGNOSIS — O09.92 SUPERVISION OF HIGH RISK PREGNANCY IN SECOND TRIMESTER: Primary | ICD-10-CM

## 2019-11-08 DIAGNOSIS — J45.909 ASTHMA, UNSPECIFIED ASTHMA SEVERITY, UNSPECIFIED WHETHER COMPLICATED, UNSPECIFIED WHETHER PERSISTENT: ICD-10-CM

## 2019-11-08 DIAGNOSIS — O99.212 OBESITY AFFECTING PREGNANCY IN SECOND TRIMESTER: ICD-10-CM

## 2019-11-08 DIAGNOSIS — E03.9 HYPOTHYROIDISM, UNSPECIFIED TYPE: ICD-10-CM

## 2019-11-08 PROCEDURE — PNV: Performed by: NURSE PRACTITIONER

## 2019-11-08 NOTE — ED PROVIDER NOTES
History  Chief Complaint   Patient presents with    URI     15WKS PREGNANT WITH URI S/S SINCE MONDAY  SORE THROAT, COUGH, NASAL CONGESTION  TRIED SUDAFED AND STARRATUSSIN (THIS MORNING)  NOTES URINATING SELF WITH COUGHING  Patient is a 42-year-old  that is 15 weeks pregnant that presents with upper respiratory symptoms  Patient says that she has had several days of cough productive of whitish/yellowish sputum, rhinorrhea, congestion, sore throat  Patient has taken over-the-counter cough suppressants with little relief  She denies any significant dyspnea  She has sharp/stabbing chest pain when she is coughing that is completely resolved when she is not coughing  She denies any associated nausea, vomiting  She has been able to stay hydrated despite her illness  She also notes some abdominal cramping pain that after coughing for extended period time  Currently she has absolutely no abdominal pain  Abdominal pain resolved after coughing episodes  She denies any vaginal bleeding or vaginal discharge  She denies dysuria or hematuria  She denies any diarrhea, melena, hematochezia  No known sick contacts  She denies fevers, chills, rigors  Patient says that pregnancy has been unremarkable to this point  Ultrasound has confirmed intrauterine pregnancy  Prior to Admission Medications   Prescriptions Last Dose Informant Patient Reported? Taking?    Prenatal MV-Min-FA-Omega-3 (PRENATAL GUMMIES/DHA & FA PO)  Self Yes No   Sig: Take by mouth   acetaminophen (TYLENOL) 500 mg tablet Past Week at Unknown time Self No Yes   Sig: Take 2 tablets (1,000 mg total) by mouth every 6 (six) hours as needed for mild pain or moderate pain   albuterol (2 5 mg/3 mL) 0 083 % nebulizer solution Not Taking at Unknown time Self No No   Sig: Take 1 vial (2 5 mg total) by nebulization every 6 (six) hours as needed for wheezing or shortness of breath   Patient not taking: Reported on 2019   albuterol (PROVENTIL HFA,VENTOLIN HFA) 90 mcg/act inhaler Not Taking at Unknown time Self No No   Sig: Inhale 2 puffs every 4 (four) hours as needed for wheezing   Patient not taking: Reported on 2019   doxylamine (UNISON) 25 MG tablet Not Taking at Unknown time Self No No   Sig: Take 1 tablet (25 mg total) by mouth daily at bedtime as needed for nausea   Patient not taking: Reported on 2019   levothyroxine 75 mcg tablet 2019 at Unknown time  No Yes   Sig: Take 1 tablet (75 mcg total) by mouth daily   lidocaine (LMX) 4 % cream  Self No No   Sig: Apply topically as needed for mild pain   Patient not taking: Reported on 10/15/2019   promethazine (PHENERGAN) 12 5 MG tablet Not Taking at Unknown time Self No No   Sig: Take 1 tablet (12 5 mg total) by mouth every 6 (six) hours as needed for nausea or vomiting   Patient not taking: Reported on 2019   pyridoxine (B-6) 25 MG tablet  Self No No   Sig: Take 1 tablet (25 mg total) by mouth every 8 (eight) hours as needed (nausea)      Facility-Administered Medications: None       Past Medical History:   Diagnosis Date    Abnormal Pap smear of cervix     Anemia     Disease of thyroid gland     Fibroid     Was told during last pregnancy had fibroid  Asymtomtic    Hypothyroidism     Kidney stone     Years ago   Had during last pregnancy    Varicella     Patient did at child       Past Surgical History:   Procedure Laterality Date     SECTION  2009     SECTION  2012    CHOLECYSTECTOMY  2013    DENTAL SURGERY      [place] teeth extraction       Family History   Problem Relation Age of Onset    COPD Mother     Heart disease Mother    [employer] HIV Father     Alcohol abuse Father     Substance Abuse Father     Seizures Father     Depression Father     Seizures Sister     Post-traumatic stress disorder Sister     Anxiety disorder Sister     Depression Sister     Bipolar disorder Brother     Anxiety disorder Brother     Depression Brother  Seizures Daughter     Anxiety disorder Daughter     Depression Daughter     Hypothyroidism Paternal Grandmother     Breast cancer Maternal Aunt     Colon cancer Maternal Aunt     Anxiety disorder Son     ADD / ADHD Son     Parkinsonism Maternal Grandmother     No Known Problems Maternal Grandfather     No Known Problems Paternal Grandfather     No Known Problems Half-Sister     No Known Problems Half-Sister     Diabetes Half-Brother     No Known Problems Daughter      I have reviewed and agree with the history as documented  Social History     Tobacco Use    Smoking status: Never Smoker    Smokeless tobacco: Never Used   Substance Use Topics    Alcohol use: Yes     Comment: not currently    Drug use: No        Review of Systems   Constitutional: Negative for chills, diaphoresis, fatigue and fever  HENT: Positive for congestion and rhinorrhea  Negative for facial swelling, sore throat and trouble swallowing  Respiratory: Positive for cough and shortness of breath  Negative for chest tightness and wheezing  Cardiovascular: Negative for chest pain  Gastrointestinal: Negative for abdominal distention, abdominal pain, diarrhea, nausea and vomiting  Genitourinary: Negative for dysuria  Musculoskeletal: Negative for back pain, neck pain and neck stiffness  Skin: Negative for color change, pallor, rash and wound  Neurological: Negative for weakness, light-headedness and numbness  Psychiatric/Behavioral: Negative for agitation  All other systems reviewed and are negative        Physical Exam  ED Triage Vitals   Temperature Pulse Respirations Blood Pressure SpO2   11/06/19 0919 11/06/19 0919 11/06/19 0919 11/06/19 0923 11/06/19 0919   98 °F (36 7 °C) 101 20 168/80 95 %      Temp Source Heart Rate Source Patient Position - Orthostatic VS BP Location FiO2 (%)   11/06/19 0919 11/06/19 0919 11/06/19 1041 11/06/19 1041 --   Oral Monitor Sitting Right arm       Pain Score       11/06/19 0919       7             Orthostatic Vital Signs  Vitals:    11/06/19 0919 11/06/19 0923 11/06/19 1041   BP:  168/80 159/90   Pulse: 101  (!) 110   Patient Position - Orthostatic VS:   Sitting       Physical Exam   Constitutional: She is oriented to person, place, and time  She appears well-developed and well-nourished  No distress  HENT:   Head: Normocephalic  Eyes: Pupils are equal, round, and reactive to light  Neck: Normal range of motion  Neck supple  Cardiovascular: Normal rate, regular rhythm, normal heart sounds and intact distal pulses  Pulmonary/Chest: Effort normal and breath sounds normal    Lungs are clear bilaterally   Abdominal: Soft  Bowel sounds are normal  She exhibits distension  There is no tenderness  There is no guarding  Abdomen is soft, distended, nontender  No rebound tenderness or guarding is noted  No masses palpated  Normal bowel sounds  Musculoskeletal: Normal range of motion  She exhibits no edema, tenderness or deformity  Neurological: She is alert and oriented to person, place, and time  No cranial nerve deficit or sensory deficit  Skin: Skin is warm and dry  Capillary refill takes less than 2 seconds  Psychiatric: She has a normal mood and affect  Her behavior is normal  Judgment and thought content normal    Vitals reviewed  ED Medications  Medications   acetaminophen (TYLENOL) tablet 975 mg (975 mg Oral Given 11/6/19 1113)       Diagnostic Studies  Results Reviewed     None                 XR chest 2 views   Final Result by Madeline Gibson MD (11/06 1124)      No acute cardiopulmonary disease  Workstation performed: [id number]               Procedures  Procedures        ED Course                               MDM  Number of Diagnoses or Management Options  Viral URI with cough: new and does not require workup  Diagnosis management comments: Patient is a 60-year-old female who presents with cough, rhinorrhea, congestion due to viral URI  Chest x-ray negative for any pneumonia  Transabdominal ultrasound showed good fetal movement with fetal heart rate in the 150s  Patient was advised to use Tylenol for symptomatic treatment along with honey for cough suppression  She was also advised to follow up with her obstetrician on Friday to ensure resolution of symptoms  Patient communicated understanding and was advised to return to care if she has worsening symptoms  Amount and/or Complexity of Data Reviewed  Clinical lab tests: ordered and reviewed  Tests in the radiology section of CPT®: ordered and reviewed    Risk of Complications, Morbidity, and/or Mortality  Presenting problems: low  Diagnostic procedures: low  Management options: low    Patient Progress  Patient progress: improved      Disposition  Final diagnoses:   Viral URI with cough     Time reflects when diagnosis was documented in both MDM as applicable and the Disposition within this note     Time User Action Codes Description Comment    11/6/2019 11:21 AM Maira Angry Add [J06 9,  B97 89] Viral URI with cough       ED Disposition     ED Disposition Condition Date/Time Comment    Discharge Stable Wed Nov 6, 2019 11:21 AM Jordyn Sosa discharge to home/self care              Follow-up Information     Follow up With Specialties Details Why Contact Info Additional [address] Emergency Department Emergency Medicine  If symptoms worsen [address]  [phone]  ED, [address] [place] [address] 108          Discharge Medication List as of 11/6/2019 11:21 AM      CONTINUE these medications which have NOT CHANGED    Details   acetaminophen (TYLENOL) 500 mg tablet Take 2 tablets (1,000 mg total) by mouth every 6 (six) hours as needed for mild pain or moderate pain, Starting Sun 9/22/2019, Print      levothyroxine 75 mcg tablet Take 1 tablet (75 mcg total) by mouth daily, Starting Wed 10/23/2019, Normal      albuterol (2 5 mg/3 mL) 0 083 % nebulizer solution Take 1 vial (2 5 mg total) by nebulization every 6 (six) hours as needed for wheezing or shortness of breath, Starting Thu 3/14/2019, Normal      albuterol (PROVENTIL HFA,VENTOLIN HFA) 90 mcg/act inhaler Inhale 2 puffs every 4 (four) hours as needed for wheezing, Starting Fri 11/23/2018, Print      doxylamine (UNISON) 25 MG tablet Take 1 tablet (25 mg total) by mouth daily at bedtime as needed for nausea, Starting Sun 9/22/2019, Print      lidocaine (LMX) 4 % cream Apply topically as needed for mild pain, Starting Sun 9/22/2019, Print      Prenatal MV-Min-FA-Omega-3 (PRENATAL GUMMIES/DHA & FA PO) Take by mouth, Historical Med      promethazine (PHENERGAN) 12 5 MG tablet Take 1 tablet (12 5 mg total) by mouth every 6 (six) hours as needed for nausea or vomiting, Starting Tue 10/15/2019, Normal      pyridoxine (B-6) 25 MG tablet Take 1 tablet (25 mg total) by mouth every 8 (eight) hours as needed (nausea), Starting Sun 9/22/2019, Print           No discharge procedures on file  ED Provider  Attending physically available and evaluated Olamide Szymanski I managed the patient along with the ED Attending      Electronically Signed by         Katy Devlin MD  11/08/19 0875

## 2019-11-08 NOTE — ASSESSMENT & PLAN NOTE
Cellfree DNA testing completed through [facility] was negative  Having a girl!

## 2019-11-08 NOTE — PROGRESS NOTES
Problem List Items Addressed This Visit        Endocrine    Hypothyroidism     Last TSH 5 360 on 10/22/19  Levothyroxine was increased to 75mcg at that time and patient has slip to complete repeat TSH in 2 weeks  Respiratory    Asthma     Asthma symptoms have been stable  Inhaler use is prn and she has not used it at all so far throughout this pregnancy  Other    AMA (advanced maternal age) multigravida 33+     Cellfree DNA testing completed through [facility] was negative  Having a girl! Obesity affecting pregnancy     Early glucola WNL  Will have third trimester growth scan and  surveillance beginning at 36 weeks  Supervision of high risk pregnancy in second trimester - Primary     Patient c/o of ongoing cough, which she was evaluated for in the ED 2 days ago  Diagnosed with viral URI  Has been taking honey for relief  Synptoms are improving per patient and she denies chest pain, SOB  Recommended use of Sudafed and Robitussin  Should f/u with PCP if symptoms worsen  Cannot appreciate fetal movement yet  Denies contractions, cramping, leakage of fluid or vaginal bleeding  Level 2 ultrasound is scheduled  Flu vaccine should be offered at next prenatal visit  Reviewed reasons to call  RTO in 4 weeks

## 2019-11-08 NOTE — ASSESSMENT & PLAN NOTE
Asthma symptoms have been stable  Inhaler use is prn and she has not used it at all so far throughout this pregnancy

## 2019-11-08 NOTE — ASSESSMENT & PLAN NOTE
Patient c/o of ongoing cough, which she was evaluated for in the ED 2 days ago  Diagnosed with viral URI  Has been taking honey for relief  Synptoms are improving per patient and she denies chest pain, SOB  Recommended use of Sudafed and Robitussin  Should f/u with PCP if symptoms worsen  Cannot appreciate fetal movement yet  Denies contractions, cramping, leakage of fluid or vaginal bleeding  Level 2 ultrasound is scheduled  Flu vaccine should be offered at next prenatal visit  Reviewed reasons to call  RTO in 4 weeks

## 2019-11-08 NOTE — ASSESSMENT & PLAN NOTE
Last TSH 5 360 on 10/22/19  Levothyroxine was increased to 75mcg at that time and patient has slip to complete repeat TSH in 2 weeks

## 2019-11-09 LAB
2ND TRIMESTER 4 SCREEN SERPL-IMP: NORMAL
AFP ADJ MOM SERPL: 0.88
AFP INTERP AMN-IMP: NORMAL
AFP INTERP SERPL-IMP: NORMAL
AFP INTERP SERPL-IMP: NORMAL
AFP SERPL-MCNC: 17.5 NG/ML
AGE AT DELIVERY: 40.2 YR
GA METHOD: NORMAL
GA: 16 WEEKS
IDDM PATIENT QL: YES
MULTIPLE PREGNANCY: NO
NEURAL TUBE DEFECT RISK FETUS: 5738 %

## 2019-11-13 ENCOUNTER — TELEPHONE (OUTPATIENT)
Dept: PERINATAL CARE | Facility: CLINIC | Age: 39
End: 2019-11-13

## 2019-11-13 NOTE — TELEPHONE ENCOUNTER
----- Message from Bisi Mejia MD sent at 11/9/2019  9:43 AM EST -----  [facility] RN staff, I've reviewed this MSAFP result which is normal, can you call her regarding this result? Thank you    Bisi Mejia MD

## 2019-12-04 ENCOUNTER — ROUTINE PRENATAL (OUTPATIENT)
Dept: OBGYN CLINIC | Facility: CLINIC | Age: 39
End: 2019-12-04

## 2019-12-04 VITALS — DIASTOLIC BLOOD PRESSURE: 84 MMHG | SYSTOLIC BLOOD PRESSURE: 112 MMHG | WEIGHT: 260.2 LBS | BODY MASS INDEX: 47.59 KG/M2

## 2019-12-04 DIAGNOSIS — Z34.82 PRENATAL CARE, SUBSEQUENT PREGNANCY, SECOND TRIMESTER: Primary | ICD-10-CM

## 2019-12-04 LAB
SL AMB  POCT GLUCOSE, UA: NORMAL
SL AMB POCT URINE PROTEIN: NORMAL

## 2019-12-04 PROCEDURE — PNV: Performed by: OBSTETRICS & GYNECOLOGY

## 2019-12-06 ENCOUNTER — ROUTINE PRENATAL (OUTPATIENT)
Dept: PERINATAL CARE | Facility: CLINIC | Age: 39
End: 2019-12-06
Payer: COMMERCIAL

## 2019-12-06 VITALS
BODY MASS INDEX: 47.55 KG/M2 | HEART RATE: 91 BPM | DIASTOLIC BLOOD PRESSURE: 70 MMHG | HEIGHT: 62 IN | WEIGHT: 258.4 LBS | SYSTOLIC BLOOD PRESSURE: 105 MMHG

## 2019-12-06 DIAGNOSIS — O44.42 LOW LYING PLACENTA NOS OR WITHOUT HEMORRHAGE, SECOND TRIMESTER: ICD-10-CM

## 2019-12-06 DIAGNOSIS — Z3A.20 20 WEEKS GESTATION OF PREGNANCY: ICD-10-CM

## 2019-12-06 DIAGNOSIS — O09.522 MULTIGRAVIDA OF ADVANCED MATERNAL AGE IN SECOND TRIMESTER: Primary | ICD-10-CM

## 2019-12-06 DIAGNOSIS — Z36.86 ENCOUNTER FOR ANTENATAL SCREENING FOR CERVICAL LENGTH: ICD-10-CM

## 2019-12-06 PROCEDURE — 76817 TRANSVAGINAL US OBSTETRIC: CPT | Performed by: OBSTETRICS & GYNECOLOGY

## 2019-12-06 PROCEDURE — 99212 OFFICE O/P EST SF 10 MIN: CPT | Performed by: OBSTETRICS & GYNECOLOGY

## 2019-12-06 PROCEDURE — 76811 OB US DETAILED SNGL FETUS: CPT | Performed by: OBSTETRICS & GYNECOLOGY

## 2019-12-06 NOTE — PROGRESS NOTES
A transvaginal ultrasound was performed  Sonographer note on use of High Level Disinfection Process (Trophon) for transvaginal probe# 3 used, serial [id number]    [address] Tabitha Liz

## 2019-12-06 NOTE — PROGRESS NOTES
The patient was seen today for an ultrasound  Please see ultrasound report (located under Ob Procedures) for additional details  Thank you very much for allowing us to participate in the care of this very nice patient  Should you have any questions, please do not hesitate to contact me  Hola De La Paz MD [id number]  Attending Physician, Jose

## 2020-01-02 ENCOUNTER — ROUTINE PRENATAL (OUTPATIENT)
Dept: OBGYN CLINIC | Facility: CLINIC | Age: 40
End: 2020-01-02

## 2020-01-02 VITALS — SYSTOLIC BLOOD PRESSURE: 130 MMHG | WEIGHT: 263.4 LBS | BODY MASS INDEX: 48.18 KG/M2 | DIASTOLIC BLOOD PRESSURE: 84 MMHG

## 2020-01-02 DIAGNOSIS — Z98.891 PREVIOUS CESAREAN SECTION: ICD-10-CM

## 2020-01-02 DIAGNOSIS — O09.522 MULTIGRAVIDA OF ADVANCED MATERNAL AGE IN SECOND TRIMESTER: Primary | ICD-10-CM

## 2020-01-02 DIAGNOSIS — E03.9 HYPOTHYROIDISM, UNSPECIFIED TYPE: ICD-10-CM

## 2020-01-02 DIAGNOSIS — Z34.82 PRENATAL CARE, SUBSEQUENT PREGNANCY, SECOND TRIMESTER: ICD-10-CM

## 2020-01-02 LAB
SL AMB  POCT GLUCOSE, UA: NORMAL
SL AMB POCT URINE PROTEIN: NORMAL

## 2020-01-02 PROCEDURE — PNV: Performed by: NURSE PRACTITIONER

## 2020-01-02 NOTE — LETTER
January 2, 2020     Patient: Diana Maldonado   YOB: 1980   Date of Visit: 1/2/2020       To Whom it May Concern:    Diana Maldonado is under my professional care  She was seen in my office on 1/2/2020  She is currently pregnant and receiving prenatal care at our office  Her due date is 4/23/20  If you have any questions or concerns, please don't hesitate to call           Sincerely,          RAFAELA Heck        CC: No Recipients

## 2020-01-02 NOTE — ASSESSMENT & PLAN NOTE
Denies OB complaints  Good fetal movement  Denies contractions, cramping, leakage of fluid or vaginal bleeding  Has repeat ultrasound at 28 weeks for low-lying placenta and missed anatomy  Patient plans repeat   28 week labs slip provided and explained  S/p flu vaccine  Patient is a [employer] employee and received at work  Reviewed reasons to call  RTO in 4 weeks

## 2020-01-02 NOTE — PROGRESS NOTES
28 wk labs given  Breast pump RX faxed  Early 4HS=104 Level 2 U/S done  Still has nausea and vomiting- not as frequently  GFM! Having a girl!

## 2020-01-02 NOTE — PROGRESS NOTES
Problem List Items Addressed This Visit        Endocrine    Hypothyroidism     Currently maintained on Levothyroxine 75mcg PO daily  Recommended repeat TSH with 28 week labs  Relevant Orders    TSH, 3rd generation with Free T4 reflex       Other    AMA (advanced maternal age) multigravida 33+ - Primary     Denies OB complaints  Good fetal movement  Denies contractions, cramping, leakage of fluid or vaginal bleeding  Has repeat ultrasound at 28 weeks for low-lying placenta and missed anatomy  Patient plans repeat   28 week labs slip provided and explained  S/p flu vaccine  Patient is a [employer] employee and received at work  Reviewed reasons to call  RTO in 4 weeks             Previous  section      Other Visit Diagnoses     Prenatal care, subsequent pregnancy, second trimester        Relevant Orders    CBC    Glucose, 1H PG    RPR    POCT urine dip

## 2020-01-21 ENCOUNTER — TELEPHONE (OUTPATIENT)
Dept: OBGYN CLINIC | Facility: CLINIC | Age: 40
End: 2020-01-21

## 2020-01-21 NOTE — TELEPHONE ENCOUNTER
Pt got 10 fkc in 1 1/2 hrs, is still worried I made her an earlier apt this week in place of next week for her ob check

## 2020-01-21 NOTE — TELEPHONE ENCOUNTER
Pt hit her belly against her tv stand and said she has some diminished baby movement, told her to do fkc for 1 1/2 to 2 hrs and call us back, no bleeding no pain

## 2020-01-24 ENCOUNTER — ROUTINE PRENATAL (OUTPATIENT)
Dept: OBGYN CLINIC | Facility: CLINIC | Age: 40
End: 2020-01-24

## 2020-01-24 ENCOUNTER — APPOINTMENT (OUTPATIENT)
Dept: LAB | Facility: CLINIC | Age: 40
End: 2020-01-24
Payer: COMMERCIAL

## 2020-01-24 VITALS — BODY MASS INDEX: 49.93 KG/M2 | SYSTOLIC BLOOD PRESSURE: 118 MMHG | DIASTOLIC BLOOD PRESSURE: 78 MMHG | WEIGHT: 273 LBS

## 2020-01-24 DIAGNOSIS — E03.9 HYPOTHYROIDISM, UNSPECIFIED TYPE: ICD-10-CM

## 2020-01-24 DIAGNOSIS — Z3A.27 27 WEEKS GESTATION OF PREGNANCY: ICD-10-CM

## 2020-01-24 DIAGNOSIS — O99.212 OBESITY AFFECTING PREGNANCY IN SECOND TRIMESTER: ICD-10-CM

## 2020-01-24 DIAGNOSIS — Z34.82 PRENATAL CARE, SUBSEQUENT PREGNANCY, SECOND TRIMESTER: ICD-10-CM

## 2020-01-24 DIAGNOSIS — O09.522 MULTIGRAVIDA OF ADVANCED MATERNAL AGE IN SECOND TRIMESTER: Primary | ICD-10-CM

## 2020-01-24 DIAGNOSIS — O44.42 LOW LYING PLACENTA NOS OR WITHOUT HEMORRHAGE, SECOND TRIMESTER: ICD-10-CM

## 2020-01-24 DIAGNOSIS — O24.419 GESTATIONAL DIABETES MELLITUS (GDM) IN SECOND TRIMESTER, GESTATIONAL DIABETES METHOD OF CONTROL UNSPECIFIED: ICD-10-CM

## 2020-01-24 LAB
ERYTHROCYTE [DISTWIDTH] IN BLOOD BY AUTOMATED COUNT: 15.9 % (ref 11.6–15.1)
GLUCOSE 1H P 50 G GLC PO SERPL-MCNC: 182 MG/DL
HCT VFR BLD AUTO: 35.9 % (ref 34.8–46.1)
HGB BLD-MCNC: 10.8 G/DL (ref 11.5–15.4)
MCH RBC QN AUTO: 26.2 PG (ref 26.8–34.3)
MCHC RBC AUTO-ENTMCNC: 30.1 G/DL (ref 31.4–37.4)
MCV RBC AUTO: 87 FL (ref 82–98)
PLATELET # BLD AUTO: 278 THOUSANDS/UL (ref 149–390)
PMV BLD AUTO: 10.9 FL (ref 8.9–12.7)
RBC # BLD AUTO: 4.13 MILLION/UL (ref 3.81–5.12)
RPR SER QL: NORMAL
TSH SERPL DL<=0.05 MIU/L-ACNC: 1.54 UIU/ML (ref 0.36–3.74)
WBC # BLD AUTO: 10.29 THOUSAND/UL (ref 4.31–10.16)

## 2020-01-24 PROCEDURE — 82950 GLUCOSE TEST: CPT | Performed by: NURSE PRACTITIONER

## 2020-01-24 PROCEDURE — PNV: Performed by: NURSE PRACTITIONER

## 2020-01-24 PROCEDURE — 86592 SYPHILIS TEST NON-TREP QUAL: CPT

## 2020-01-24 PROCEDURE — 85027 COMPLETE CBC AUTOMATED: CPT | Performed by: NURSE PRACTITIONER

## 2020-01-24 PROCEDURE — 84443 ASSAY THYROID STIM HORMONE: CPT

## 2020-01-24 PROCEDURE — 36415 COLL VENOUS BLD VENIPUNCTURE: CPT | Performed by: NURSE PRACTITIONER

## 2020-01-24 NOTE — PROGRESS NOTES
Patient presents for problem visit  Hit her belly on tv stand on Monday 1/20/2020  Has had pain in her groin ever since  Baby has been moving well since Tuesday

## 2020-01-24 NOTE — ASSESSMENT & PLAN NOTE
Denies OB complaints  Patient tripped over a laundry basket 4 days ago and hit the side of her abdomen on a tv stand  Good fetal movement  Denies contractions, cramping, leakage of fluid or vaginal bleeding  S/p flu vaccine  Reviewed reasons to call  RTO in 2 weeks

## 2020-01-24 NOTE — ASSESSMENT & PLAN NOTE
Patient had TSH drawn today with 28 week labs  Results pending  Currently taking Levothyroxine 75mcg PO daily

## 2020-01-24 NOTE — ASSESSMENT & PLAN NOTE
Patient failed 1 hour gtt-182  Discussed with patient that she is now considered GDM and referred to diabetic pathways for blood glucose monitoring  Patient became very angry and stated that "she will no longer come to her visits" if this is the case  Patient feels it was elevated because she was told not to fast  Reviewed with patient that she should not fast before 1 hour gtt  She is requesting to redo her 1 hour gtt prior to next appointment in 2 weeks

## 2020-01-24 NOTE — PROGRESS NOTES
Problem List Items Addressed This Visit        Endocrine    Hypothyroidism     Patient had TSH drawn today with 28 week labs  Results pending  Currently taking Levothyroxine 75mcg PO daily  Gestational diabetes mellitus (GDM) in second trimester     Patient failed 1 hour gtt-182  Discussed with patient that she is now considered GDM and referred to diabetic pathways for blood glucose monitoring  Patient became very angry and stated that "she will no longer come to her visits" if this is the case  Patient feels it was elevated because she was told not to fast  Reviewed with patient that she should not fast before 1 hour gtt  She is requesting to redo her 1 hour gtt prior to next appointment in 2 weeks  Relevant Orders    Ambulatory Referral to Maternal Fetal Medicine       Other    AMA (advanced maternal age) multigravida 33+ - Primary     Denies OB complaints  Patient tripped over a laundry basket 4 days ago and hit the side of her abdomen on a tv stand  Good fetal movement  Denies contractions, cramping, leakage of fluid or vaginal bleeding  S/p flu vaccine  Reviewed reasons to call  RTO in 2 weeks  Relevant Orders    Glucose, 1H PG    Obesity affecting pregnancy     Will have 3rd trimester growth scan and  surveillance beginning at 36 weeks  27 weeks gestation of pregnancy    Low lying placenta nos or without hemorrhage, second trimester     Repeat ultrasound scheduled with MFM next week

## 2020-01-27 ENCOUNTER — TELEPHONE (OUTPATIENT)
Dept: OBGYN CLINIC | Facility: CLINIC | Age: 40
End: 2020-01-27

## 2020-01-28 NOTE — PATIENT INSTRUCTIONS
Thank you for choosing us for your  care today  If you have any questions about your ultrasound or care, please do not hesitate to contact us or your primary obstetrician  Some general instructions for your pregnancy are:     Exercise: we encourage most pregnant women to get regular physical activity in pregnancy  Exercise has been shown to reduce the risk of several pregnancy-related complications  Unless instructed otherwise, you can aim for 22 minutes per day (150 minutes per week! )   Nutrition: aim for calcium-rich and iron-rich foods as well as healthy sources of protein   Weight: ask your doctor what is the appropriate amount of weight for you to gain in pregnancy  We have nutritionists here if you would like to meet with them   Protect against the flu: get yourself and your entire household vaccinated against influenza  Tell your partner to get vaccinated as well  Good hand hygiene can reduce the spread of this potentially deadly virus  Insist that everyone who is going to hold or be around your baby get vaccinated   Learn about Preeclampsia: preeclampsia is a common, serious complication in pregnancy  A blood pressure of 140mmHg (top number or systolic) OR 12FNRN (bottom number or diastolic) is elevated and needs evaluation by your doctor  Ask your doctor early in pregnancy if you should take aspirin (not motrin or tylenol) to prevent preeclampsia  If you were advised to take aspirin to prevent preeclampsia, a daily dose of 162mg or 81mg is advised  One resource to learn more is www  preeclampsia org    If you smoke, try to reduce how many cigarettes you smoke or quit completely  Do not vape   Other warning signs to watch out for in pregnancy or postpartum: chest pain, obstructed breathing or shortness of breath, seizures, thoughts of hurting yourself or your baby, bleeding, a painful or swollen leg, fever, or headache ([facility] POST-BIRTH Warning Signs campaign)    If these happen call 911  Itching is also not normal in pregnancy and if you experience this, especially over your hands and feet, potentially worse at night, notify your doctors

## 2020-01-31 ENCOUNTER — ULTRASOUND (OUTPATIENT)
Dept: PERINATAL CARE | Facility: CLINIC | Age: 40
End: 2020-01-31
Payer: COMMERCIAL

## 2020-01-31 VITALS
HEART RATE: 109 BPM | HEIGHT: 62 IN | BODY MASS INDEX: 50.79 KG/M2 | WEIGHT: 276 LBS | DIASTOLIC BLOOD PRESSURE: 77 MMHG | SYSTOLIC BLOOD PRESSURE: 134 MMHG

## 2020-01-31 DIAGNOSIS — Z36.89 ENCOUNTER FOR ULTRASOUND TO CHECK FETAL GROWTH: ICD-10-CM

## 2020-01-31 DIAGNOSIS — O09.523 MULTIGRAVIDA OF ADVANCED MATERNAL AGE IN THIRD TRIMESTER: Primary | ICD-10-CM

## 2020-01-31 DIAGNOSIS — IMO0002 EVALUATE ANATOMY NOT SEEN ON PRIOR SONOGRAM: ICD-10-CM

## 2020-01-31 DIAGNOSIS — O99.810 ABNORMAL GLUCOSE AFFECTING PREGNANCY: ICD-10-CM

## 2020-01-31 PROCEDURE — 76816 OB US FOLLOW-UP PER FETUS: CPT | Performed by: OBSTETRICS & GYNECOLOGY

## 2020-01-31 PROCEDURE — 99212 OFFICE O/P EST SF 10 MIN: CPT | Performed by: OBSTETRICS & GYNECOLOGY

## 2020-01-31 NOTE — PROGRESS NOTES
[address] [facility], [address]: Ms Camila Nolen was seen today at 28w1d for fetal growth and followup missed anatomy ultrasound  See ultrasound report under "OB Procedures" tab  Please don't hesitate to contact our office with any concerns or questions    Villa Rogers MD

## 2020-02-04 ENCOUNTER — APPOINTMENT (OUTPATIENT)
Dept: LAB | Facility: HOSPITAL | Age: 40
End: 2020-02-04
Payer: COMMERCIAL

## 2020-02-04 DIAGNOSIS — O99.810 ABNORMAL GLUCOSE AFFECTING PREGNANCY: ICD-10-CM

## 2020-02-04 LAB — GLUCOSE P FAST SERPL-MCNC: 96 MG/DL (ref 70–99)

## 2020-02-04 PROCEDURE — 82951 GLUCOSE TOLERANCE TEST (GTT): CPT

## 2020-02-04 PROCEDURE — 36415 COLL VENOUS BLD VENIPUNCTURE: CPT

## 2020-02-05 ENCOUNTER — TELEPHONE (OUTPATIENT)
Dept: PERINATAL CARE | Facility: OTHER | Age: 40
End: 2020-02-05

## 2020-02-05 NOTE — TELEPHONE ENCOUNTER
Left VMM on cell phone with results of fasting blood sugar  Pt advised of being referred to the diabetes in pregnancy program   Instructed to contact office with questions

## 2020-02-05 NOTE — TELEPHONE ENCOUNTER
----- Message from Rusty Cazares MD sent at 2/4/2020  3:25 PM EST -----  [facility] RN staff, I've reviewed this fasting result which is ELEVATED, can you call her regarding this result and let her know we are referring her to diabetes education? Maria D Escudero is cc'd here  Dr Supriya Edgartiff you are seeing her in 2 days  Thank you    Rusty Cazares MD

## 2020-02-06 ENCOUNTER — ROUTINE PRENATAL (OUTPATIENT)
Dept: OBGYN CLINIC | Facility: CLINIC | Age: 40
End: 2020-02-06
Payer: COMMERCIAL

## 2020-02-06 VITALS — SYSTOLIC BLOOD PRESSURE: 118 MMHG | WEIGHT: 274.6 LBS | BODY MASS INDEX: 50.22 KG/M2 | DIASTOLIC BLOOD PRESSURE: 58 MMHG

## 2020-02-06 DIAGNOSIS — Z34.83 PRENATAL CARE, SUBSEQUENT PREGNANCY, THIRD TRIMESTER: Primary | ICD-10-CM

## 2020-02-06 DIAGNOSIS — Z98.891 PREVIOUS CESAREAN SECTION: ICD-10-CM

## 2020-02-06 DIAGNOSIS — O24.419 GESTATIONAL DIABETES MELLITUS (GDM) IN SECOND TRIMESTER, GESTATIONAL DIABETES METHOD OF CONTROL UNSPECIFIED: ICD-10-CM

## 2020-02-06 DIAGNOSIS — Z23 NEED FOR DIPHTHERIA-TETANUS-PERTUSSIS (TDAP) VACCINE: ICD-10-CM

## 2020-02-06 DIAGNOSIS — O44.43 LOW-LYING PLACENTA WITHOUT HEMORRHAGE, THIRD TRIMESTER: ICD-10-CM

## 2020-02-06 LAB
SL AMB  POCT GLUCOSE, UA: NORMAL
SL AMB POCT URINE PROTEIN: NORMAL

## 2020-02-06 PROCEDURE — PNV: Performed by: OBSTETRICS & GYNECOLOGY

## 2020-02-06 PROCEDURE — 90471 IMMUNIZATION ADMIN: CPT

## 2020-02-06 PROCEDURE — 90715 TDAP VACCINE 7 YRS/> IM: CPT

## 2020-02-06 NOTE — ASSESSMENT & PLAN NOTE
Discussed with patient the diagnosis and expectations of care moving forward  She is aware diabetic pathways will be calling and scheduling her  FOB has had diabetes for the last 13y and is supportive  She is stressed and she has felt very shamed about her weight

## 2020-02-06 NOTE — PROGRESS NOTES
Not sleeping  Slept 27 hrs this week so far  Working nights 10hr shifts  Took Unisom 1 day this week without success  Tdap shot given today  Kick count paper given

## 2020-02-06 NOTE — PROGRESS NOTES
Discussed lack of sleep with Samantha Bacon  Discussed sleep hygeine and potential use of unisom or melatonin if needed  Kick count instructions reviewed  Problem List Items Addressed This Visit        Endocrine    Gestational diabetes mellitus (GDM) in second trimester     Discussed with patient the diagnosis and expectations of care moving forward  She is aware diabetic pathways will be calling and scheduling her  FOB has had diabetes for the last 13y and is supportive  She is stressed and she has felt very shamed about her weight  Other    Previous  section     Plan for repeat  and tubal ligation  Low lying placenta nos or without hemorrhage, second trimester     Has follow up scheduled              Other Visit Diagnoses     Prenatal care, subsequent pregnancy, third trimester    -  Primary    Relevant Orders    POCT urine dip    Need for diphtheria-tetanus-pertussis (Tdap) vaccine        Relevant Orders    TDAP VACCINE GREATER THAN OR EQUAL TO 8YO IM (Completed)

## 2020-02-08 ENCOUNTER — APPOINTMENT (EMERGENCY)
Dept: NON INVASIVE DIAGNOSTICS | Facility: HOSPITAL | Age: 40
End: 2020-02-08
Payer: COMMERCIAL

## 2020-02-08 ENCOUNTER — HOSPITAL ENCOUNTER (EMERGENCY)
Facility: HOSPITAL | Age: 40
Discharge: HOME | End: 2020-02-08
Attending: EMERGENCY MEDICINE | Admitting: EMERGENCY MEDICINE
Payer: COMMERCIAL

## 2020-02-08 VITALS
DIASTOLIC BLOOD PRESSURE: 79 MMHG | TEMPERATURE: 98.4 F | HEIGHT: 62 IN | BODY MASS INDEX: 50.42 KG/M2 | WEIGHT: 274 LBS | SYSTOLIC BLOOD PRESSURE: 138 MMHG | OXYGEN SATURATION: 99 % | RESPIRATION RATE: 18 BRPM | HEART RATE: 100 BPM

## 2020-02-08 DIAGNOSIS — R51.9 HEADACHE: ICD-10-CM

## 2020-02-08 DIAGNOSIS — M79.622 LEFT UPPER ARM PAIN: Primary | ICD-10-CM

## 2020-02-08 DIAGNOSIS — R07.89 ATYPICAL CHEST PAIN: ICD-10-CM

## 2020-02-08 LAB
ALBUMIN SERPL BCP-MCNC: 2.1 G/DL (ref 3.5–5)
ALP SERPL-CCNC: 131 U/L (ref 46–116)
ALT SERPL W P-5'-P-CCNC: 15 U/L (ref 12–78)
ANION GAP SERPL CALCULATED.3IONS-SCNC: 6 MMOL/L (ref 4–13)
AST SERPL W P-5'-P-CCNC: 36 U/L (ref 5–45)
ATRIAL RATE: 99 BPM
BACTERIA UR QL AUTO: ABNORMAL /HPF
BASOPHILS # BLD AUTO: 0.02 THOUSANDS/ΜL (ref 0–0.1)
BASOPHILS NFR BLD AUTO: 0 % (ref 0–1)
BILIRUB SERPL-MCNC: 0.35 MG/DL (ref 0.2–1)
BILIRUB UR QL STRIP: NEGATIVE
BUN SERPL-MCNC: 6 MG/DL (ref 5–25)
CALCIUM SERPL-MCNC: 9.1 MG/DL (ref 8.3–10.1)
CHLORIDE SERPL-SCNC: 110 MMOL/L (ref 100–108)
CLARITY UR: ABNORMAL
CO2 SERPL-SCNC: 21 MMOL/L (ref 21–32)
COLOR UR: YELLOW
CREAT SERPL-MCNC: 0.57 MG/DL (ref 0.6–1.3)
EOSINOPHIL # BLD AUTO: 0.33 THOUSAND/ΜL (ref 0–0.61)
EOSINOPHIL NFR BLD AUTO: 4 % (ref 0–6)
ERYTHROCYTE [DISTWIDTH] IN BLOOD BY AUTOMATED COUNT: 15.9 % (ref 11.6–15.1)
GFR SERPL CREATININE-BSD FRML MDRD: 116 ML/MIN/1.73SQ M
GLUCOSE SERPL-MCNC: 80 MG/DL (ref 65–140)
GLUCOSE UR STRIP-MCNC: NEGATIVE MG/DL
HCT VFR BLD AUTO: 33.2 % (ref 34.8–46.1)
HGB BLD-MCNC: 10.4 G/DL (ref 11.5–15.4)
HGB UR QL STRIP.AUTO: NEGATIVE
HYALINE CASTS #/AREA URNS LPF: ABNORMAL /LPF
IMM GRANULOCYTES # BLD AUTO: 0.08 THOUSAND/UL (ref 0–0.2)
IMM GRANULOCYTES NFR BLD AUTO: 1 % (ref 0–2)
KETONES UR STRIP-MCNC: ABNORMAL MG/DL
LEUKOCYTE ESTERASE UR QL STRIP: NEGATIVE
LYMPHOCYTES # BLD AUTO: 1.58 THOUSANDS/ΜL (ref 0.6–4.47)
LYMPHOCYTES NFR BLD AUTO: 18 % (ref 14–44)
MAGNESIUM SERPL-MCNC: 1.9 MG/DL (ref 1.6–2.6)
MCH RBC QN AUTO: 26.3 PG (ref 26.8–34.3)
MCHC RBC AUTO-ENTMCNC: 31.3 G/DL (ref 31.4–37.4)
MCV RBC AUTO: 84 FL (ref 82–98)
MONOCYTES # BLD AUTO: 0.71 THOUSAND/ΜL (ref 0.17–1.22)
MONOCYTES NFR BLD AUTO: 8 % (ref 4–12)
NEUTROPHILS # BLD AUTO: 6.27 THOUSANDS/ΜL (ref 1.85–7.62)
NEUTS SEG NFR BLD AUTO: 69 % (ref 43–75)
NITRITE UR QL STRIP: NEGATIVE
NON-SQ EPI CELLS URNS QL MICRO: ABNORMAL /HPF
NRBC BLD AUTO-RTO: 0 /100 WBCS
NT-PROBNP SERPL-MCNC: 31 PG/ML
P AXIS: 52 DEGREES
PH UR STRIP.AUTO: 6 [PH]
PLATELET # BLD AUTO: 292 THOUSANDS/UL (ref 149–390)
PMV BLD AUTO: 10.4 FL (ref 8.9–12.7)
POTASSIUM SERPL-SCNC: 5.1 MMOL/L (ref 3.5–5.3)
PR INTERVAL: 114 MS
PROT SERPL-MCNC: 7.4 G/DL (ref 6.4–8.2)
PROT UR STRIP-MCNC: ABNORMAL MG/DL
QRS AXIS: 48 DEGREES
QRSD INTERVAL: 74 MS
QT INTERVAL: 366 MS
QTC INTERVAL: 469 MS
RBC # BLD AUTO: 3.95 MILLION/UL (ref 3.81–5.12)
RBC #/AREA URNS AUTO: ABNORMAL /HPF
SODIUM SERPL-SCNC: 137 MMOL/L (ref 136–145)
SP GR UR STRIP.AUTO: 1.02 (ref 1–1.03)
T WAVE AXIS: 40 DEGREES
TROPONIN I SERPL-MCNC: <0.02 NG/ML
TROPONIN I SERPL-MCNC: <0.02 NG/ML
UROBILINOGEN UR QL STRIP.AUTO: 0.2 E.U./DL
VENTRICULAR RATE: 99 BPM
WBC # BLD AUTO: 8.99 THOUSAND/UL (ref 4.31–10.16)
WBC #/AREA URNS AUTO: ABNORMAL /HPF

## 2020-02-08 PROCEDURE — 93970 EXTREMITY STUDY: CPT

## 2020-02-08 PROCEDURE — 84484 ASSAY OF TROPONIN QUANT: CPT | Performed by: EMERGENCY MEDICINE

## 2020-02-08 PROCEDURE — 93005 ELECTROCARDIOGRAM TRACING: CPT

## 2020-02-08 PROCEDURE — 83735 ASSAY OF MAGNESIUM: CPT | Performed by: EMERGENCY MEDICINE

## 2020-02-08 PROCEDURE — 85025 COMPLETE CBC W/AUTO DIFF WBC: CPT | Performed by: EMERGENCY MEDICINE

## 2020-02-08 PROCEDURE — 87086 URINE CULTURE/COLONY COUNT: CPT | Performed by: EMERGENCY MEDICINE

## 2020-02-08 PROCEDURE — 81001 URINALYSIS AUTO W/SCOPE: CPT | Performed by: EMERGENCY MEDICINE

## 2020-02-08 PROCEDURE — 93971 EXTREMITY STUDY: CPT

## 2020-02-08 PROCEDURE — 96365 THER/PROPH/DIAG IV INF INIT: CPT

## 2020-02-08 PROCEDURE — 36415 COLL VENOUS BLD VENIPUNCTURE: CPT | Performed by: EMERGENCY MEDICINE

## 2020-02-08 PROCEDURE — 99284 EMERGENCY DEPT VISIT MOD MDM: CPT | Performed by: EMERGENCY MEDICINE

## 2020-02-08 PROCEDURE — 99285 EMERGENCY DEPT VISIT HI MDM: CPT

## 2020-02-08 PROCEDURE — 83880 ASSAY OF NATRIURETIC PEPTIDE: CPT | Performed by: EMERGENCY MEDICINE

## 2020-02-08 PROCEDURE — 93010 ELECTROCARDIOGRAM REPORT: CPT | Performed by: INTERNAL MEDICINE

## 2020-02-08 PROCEDURE — 80053 COMPREHEN METABOLIC PANEL: CPT | Performed by: EMERGENCY MEDICINE

## 2020-02-08 RX ORDER — SODIUM CHLORIDE, SODIUM GLUCONATE, SODIUM ACETATE, POTASSIUM CHLORIDE, MAGNESIUM CHLORIDE, SODIUM PHOSPHATE, DIBASIC, AND POTASSIUM PHOSPHATE .53; .5; .37; .037; .03; .012; .00082 G/100ML; G/100ML; G/100ML; G/100ML; G/100ML; G/100ML; G/100ML
1000 INJECTION, SOLUTION INTRAVENOUS ONCE
Status: COMPLETED | OUTPATIENT
Start: 2020-02-08 | End: 2020-02-08

## 2020-02-08 RX ORDER — LIDOCAINE 50 MG/G
1 PATCH TOPICAL ONCE
Status: DISCONTINUED | OUTPATIENT
Start: 2020-02-08 | End: 2020-02-08 | Stop reason: HOSPADM

## 2020-02-08 RX ORDER — ACETAMINOPHEN 325 MG/1
975 TABLET ORAL ONCE
Status: COMPLETED | OUTPATIENT
Start: 2020-02-08 | End: 2020-02-08

## 2020-02-08 RX ORDER — CEPHALEXIN 500 MG/1
500 CAPSULE ORAL EVERY 8 HOURS SCHEDULED
Qty: 21 CAPSULE | Refills: 0 | Status: SHIPPED | OUTPATIENT
Start: 2020-02-08 | End: 2020-02-15

## 2020-02-08 RX ORDER — OXYCODONE HYDROCHLORIDE 5 MG/1
5 TABLET ORAL ONCE
Status: COMPLETED | OUTPATIENT
Start: 2020-02-08 | End: 2020-02-08

## 2020-02-08 RX ADMIN — ACETAMINOPHEN 975 MG: 325 TABLET ORAL at 19:09

## 2020-02-08 RX ADMIN — OXYCODONE HYDROCHLORIDE 5 MG: 5 TABLET ORAL at 19:09

## 2020-02-08 RX ADMIN — SODIUM CHLORIDE, SODIUM GLUCONATE, SODIUM ACETATE, POTASSIUM CHLORIDE, MAGNESIUM CHLORIDE, SODIUM PHOSPHATE, DIBASIC, AND POTASSIUM PHOSPHATE 1000 ML: .53; .5; .37; .037; .03; .012; .00082 INJECTION, SOLUTION INTRAVENOUS at 19:23

## 2020-02-08 RX ADMIN — LIDOCAINE 1 PATCH: 50 PATCH TOPICAL at 19:08

## 2020-02-09 LAB
ATRIAL RATE: 112 BPM
P AXIS: 72 DEGREES
PR INTERVAL: 114 MS
QRS AXIS: 77 DEGREES
QRSD INTERVAL: 76 MS
QT INTERVAL: 332 MS
QTC INTERVAL: 453 MS
T WAVE AXIS: 58 DEGREES
VENTRICULAR RATE: 112 BPM

## 2020-02-09 PROCEDURE — 93010 ELECTROCARDIOGRAM REPORT: CPT | Performed by: INTERNAL MEDICINE

## 2020-02-09 PROCEDURE — 93971 EXTREMITY STUDY: CPT | Performed by: SURGERY

## 2020-02-09 PROCEDURE — 93970 EXTREMITY STUDY: CPT | Performed by: SURGERY

## 2020-02-09 NOTE — ED ATTENDING ATTESTATION
2/8/2020  IJosy MD, saw and evaluated the patient  I have discussed the patient with the resident and agree with the resident's findings, Plan of Care, and MDM as documented in the resident's note, unless otherwise documented below  All available labs and Radiology studies were reviewed by myself  I was present for key portions of any procedure(s) performed by the resident and I was immediately available to provide assistance  I agree with the current assessment done in the Emergency Department  I have conducted an independent evaluation of this patient  Briefly, this is a 36 y o  female at 29w2d pregnancy stenting with chest pain as well as left arm swelling and erythema  Patient is currently in 3rd trimester pregnancy, has a newly diagnosed gestational diabetes  She received a tetanus immunization in left upper arm on Thursday, 2 days ago  Since then, the site of the immunization became more painful, full, and red  She has not had similar reaction to immunizations in the past   She has not had any fevers or chills  In addition, today, around 1:00 p m , patient developed a central anterior chest pain, nonradiating, not associated with breathing, not associated with shortness of breath  Touching the area is painful  She has not had any fevers or chills  She has not had any shortness of breath or cough  There has been no chest trauma  She has no history of blood clots  Pain has not improved with Tylenol  There is no abdominal pain or nausea  Physical Exam  Vitals:    02/08/20 1530 02/08/20 1600 02/08/20 1730 02/08/20 1915   BP: 138/83 112/72 129/83 134/70   TempSrc:       Pulse: 104 96 100 (!) 120   Resp: 20 20 20 (!) 24   Patient Position - Orthostatic VS: Lying Lying Sitting Sitting   Temp:           Constitutional:  Awake, alert, oriented  No acute distress  HEENT:  Normocephalic, atraumatic  Sclera anicteric, conjunctiva not injected  Moist oral mucosa    Cardiac:  Tachycardic, regular, no murmurs, rubs, or gallops  2+ radial pulses  There is tenderness with palpation along the left parasternal lower costochondral joints  There is no associated bony instability or crepitus  Respiratory:  Lungs are clear to auscultation bilaterally, no wheezes or rales  Abdomen:  Overweight  Nondistended  Gravid uterus  Extremities:  Examination of left upper arm reveals a 3 cm x 3 cm patch of erythema with induration without associated fluctuance  There are no red streaks traveling from the area of erythema  There is no pain out of proportion on exam   Integument:  No rashes or exposed areas, cap refill less than 2 seconds  Neurologic:  Awake, alert, and oriented x3    Nonfocal exam   Psychiatric:  Normal affect    Labs  Labs Reviewed   CBC AND DIFFERENTIAL - Abnormal       Result Value Ref Range Status    WBC 8 99  4 31 - 10 16 Thousand/uL Final    RBC 3 95  3 81 - 5 12 Million/uL Final    Hemoglobin 10 4 (*) 11 5 - 15 4 g/dL Final    Hematocrit 33 2 (*) 34 8 - 46 1 % Final    MCV 84  82 - 98 fL Final    MCH 26 3 (*) 26 8 - 34 3 pg Final    MCHC 31 3 (*) 31 4 - 37 4 g/dL Final    RDW 15 9 (*) 11 6 - 15 1 % Final    MPV 10 4  8 9 - 12 7 fL Final    Platelets 909  736 - 390 Thousands/uL Final    nRBC 0  /100 WBCs Final    Neutrophils Relative 69  43 - 75 % Final    Immat GRANS % 1  0 - 2 % Final    Lymphocytes Relative 18  14 - 44 % Final    Monocytes Relative 8  4 - 12 % Final    Eosinophils Relative 4  0 - 6 % Final    Basophils Relative 0  0 - 1 % Final    Neutrophils Absolute 6 27  1 85 - 7 62 Thousands/µL Final    Immature Grans Absolute 0 08  0 00 - 0 20 Thousand/uL Final    Lymphocytes Absolute 1 58  0 60 - 4 47 Thousands/µL Final    Monocytes Absolute 0 71  0 17 - 1 22 Thousand/µL Final    Eosinophils Absolute 0 33  0 00 - 0 61 Thousand/µL Final    Basophils Absolute 0 02  0 00 - 0 10 Thousands/µL Final   COMPREHENSIVE METABOLIC PANEL - Abnormal    Sodium 137  136 - 145 mmol/L Final Potassium 5 1  3 5 - 5 3 mmol/L Final    Comment: Slightly Hemolyzed; Results May be Affected    Chloride 110 (*) 100 - 108 mmol/L Final    CO2 21  21 - 32 mmol/L Final    ANION GAP 6  4 - 13 mmol/L Final    BUN 6  5 - 25 mg/dL Final    Creatinine 0 57 (*) 0 60 - 1 30 mg/dL Final    Comment: Standardized to IDMS reference method    Glucose 80  65 - 140 mg/dL Final    Comment:   If the patient is fasting, the ADA then defines impaired fasting glucose as > 100 mg/dL and diabetes as > or equal to 123 mg/dL  Specimen collection should occur prior to Sulfasalazine administration due to the potential for falsely depressed results  Specimen collection should occur prior to Sulfapyridine administration due to the potential for falsely elevated results  Calcium 9 1  8 3 - 10 1 mg/dL Final    AST 36  5 - 45 U/L Final    Comment: Slightly Hemolyzed; Results May be Affected  Specimen collection should occur prior to Sulfasalazine administration due to the potential for falsely depressed results  ALT 15  12 - 78 U/L Final    Comment:   Specimen collection should occur prior to Sulfasalazine and/or Sulfapyridine administration due to the potential for falsely depressed results       Alkaline Phosphatase 131 (*) 46 - 116 U/L Final    Total Protein 7 4  6 4 - 8 2 g/dL Final    Albumin 2 1 (*) 3 5 - 5 0 g/dL Final    Total Bilirubin 0 35  0 20 - 1 00 mg/dL Final    eGFR 116  ml/min/1 73sq m Final    Narrative:     [place] guidelines for Chronic Kidney Disease (CKD):     Stage 1 with normal or high GFR (GFR > 90 mL/min/1 73 square meters)    Stage 2 Mild CKD (GFR = 60-89 mL/min/1 73 square meters)    Stage 3A Moderate CKD (GFR = 45-59 mL/min/1 73 square meters)    Stage 3B Moderate CKD (GFR = 30-44 mL/min/1 73 square meters)    Stage 4 Severe CKD (GFR = 15-29 mL/min/1 73 square meters)    Stage 5 End Stage CKD (GFR <15 mL/min/1 73 square meters)  Note: GFR calculation is accurate only with a steady state creatinine   MAGNESIUM - Normal    Magnesium 1 9  1 6 - 2 6 mg/dL Final    Comment: Slightly Hemolyzed; Results May be Affected   TROPONIN I - Normal    Troponin I <0 02  <=0 04 ng/mL Final    Comment:   [employer] Chemistry analyzer 99% cutoff is > 0 04 ng/mL in network labs     o cTnI 99% cutoff is useful only when applied to patients in the clinical setting of myocardial ischemia   o cTnI 99% cutoff should be interpreted in the context of clinical history, ECG findings and possibly cardiac imaging to establish correct diagnosis  o cTnI 99% cutoff may be suggestive but clearly not indicative of a coronary event without the clinical setting of myocardial ischemia  NT-BNP PRO (BRAIN NATRIURETIC PEPTIDE) - Normal    NT-proBNP 31  <125 pg/mL Final   TROPONIN I         Tests  VAS upper limb venous duplex scan, unilateral/limited   Final Result      VAS lower limb venous duplex study, complete bilateral   Final Result          Procedures  ECG 12 Lead Documentation Only  Date/Time: 2/8/2020 5:01 PM  Performed by: Lauren Caputo MD  Authorized by: Lauren Caputo MD     Comments:      Normal sinus rhythm, ventricular rate 99, WI interval 114, QRS 74, , normal axis, no ST/T-wave changes to suggest ischemia, no STEMI        ED Course  Medications   lidocaine (LIDODERM) 5 % patch 1 patch (1 patch Topical Medication Applied 2/8/20 1908)   multi-electrolyte (ISOLYTE-S PH 7 4) bolus 1,000 mL (1,000 mL Intravenous New Bag 2/8/20 1923)   oxyCODONE (ROXICODONE) IR tablet 5 mg (5 mg Oral Given 2/8/20 1909)   acetaminophen (TYLENOL) tablet 975 mg (975 mg Oral Given 2/8/20 1909)       68-year-old female in 3rd trimester pregnancy presenting with chest pain as well as patch of erythema overlying immunization site  Vital signs reviewed, tachycardic, blood pressure 138/83, not hypoxic, not tachypneic    Differential diagnosis for anterior chest pain includes chest wall pain secondary to costochondritis or another musculoskeletal condition, acute coronary syndrome, PE, dissection, pneumonia  Concerning left upper arm, physical exam is most consistent with local immune reaction to Tdap, although differential does include cellulitis  There is no pain out of proportion a crepitus on exam to suggest more invasive infection such as necrotizing fasciitis  EKG obtained, is reviewed by me, as above  Tylenol and a single dose of oxycodone administered for pain while we are assessing the etiology of the pain  Troponin x1 is negative  Basic labs reveal no transaminitis, no hyperbilirubinemia, no thrombocytopenia  UA requested to rule out proteinuria  Left upper extremity and bilateral lower extremity ultrasound reveals no deep vein thrombosis  By extension, pulmonary embolism is deemed somewhat less likely in this otherwise healthy 51-year-old female who denies shortness of breath and has SpO2 of 99% on room air  Given that pain started 1:00 p m , will obtain 2nd troponin  We will also discuss the case with OBGYN given the patient is in 3rd trimester pregnancy  Delta troponin negative  ObGyn recommends evaluation in the office tomorrow  Patient discharged on a course of cephalexin in case left deltoid erythema is 2/2 cellulitis  She may have Tylenol and lidocaine patches OTC for anterior chest wall pain  Patient discharged to home with recommendations for symptom control, return precautions, and plan for follow up       Clinical Impression  Final diagnoses:   Left upper arm pain   Atypical chest pain   Headache

## 2020-02-09 NOTE — ED PROVIDER NOTES
ASSESSMENT AND PLAN    Shiva Zhang is a 36 y o  female, currently approximately 29 weeks pregnant, who presents for evaluation of left arm erythema, induration, as well as occasional episodes of chest pain over the last several hours, as well as headache    On arrival, the patient is mildly tachycardic with a heart rate of 110-120, but otherwise hemodynamically stable and well-appearing without acute distress, with a nontoxic appearance  On exam, the patient has a normal neurologic exam   She has erythema over the left deltoid, with induration, concerning for cellulitis  She has no unilateral lower extremity swelling or edema    The physical exam is otherwise unremarkable   -heart rate slightly improved without intervention to 105  -duplex studies of the bilateral lower extremities, left upper extremity negative for DVT  -lab work unremarkable  -EKG displayed sinus tachycardia, without signs of ischemia  Initial troponin was negative  -will treat pain with Tylenol, oxycodone, Lidoderm patch  -will check urinalysis to evaluate for proteinuria  -will re-evaluate, disposition currently pending  If delta troponin, delta EKG unchanged, likely discharge home   -patient was signed out to Dr Sylvain Barahona at 1900    History  Chief Complaint   Patient presents with    Arm Pain     thursday had vaccination in left arm, arm and site is swollen, painful, and she states her fingers are tingling    Chest Pain     pt reports left sided chest pain x 1 hour   Headache     HPI this is a 55-year-old female who presents for evaluation of multiple complaints  The patient is currently 29 weeks pregnant, who presents for evaluation of left arm pain  The patient notes that she recently got a Tdap vaccination, and at the site of the vaccination, developed pain and redness in fullness  She has never had a reaction like this before    Starting at 1:00 p m , the patient also developed intermittent episodes of dull, nonradiating chest pain, without aggravating or alleviating factors, and specifically without pleurisy  The episodes last several minutes at time,, spontaneously, and resolved spontaneously  She has had several episodes since 1:00 p m , but does not know the exact number  She has never had pain like this before  She denies any pleurisy, hemoptysis, or unilateral leg pain  She denies any fevers, chills, cough, sputum production, nausea, vomiting, diarrhea, abdominal pain  She does endorse a headache, which she describes as diffuse, similar to her normal headaches, and gradual in onset  She denies any unilateral weakness, numbness, tingling  She denies any urinary symptoms    Prior to Admission Medications   Prescriptions Last Dose Informant Patient Reported? Taking? Prenatal MV-Min-FA-Omega-3 (PRENATAL GUMMIES/DHA & FA PO)  Self Yes No   Sig: Take by mouth   acetaminophen (TYLENOL) 500 mg tablet  Self No No   Sig: Take 2 tablets (1,000 mg total) by mouth every 6 (six) hours as needed for mild pain or moderate pain   albuterol (2 5 mg/3 mL) 0 083 % nebulizer solution  Self No No   Sig: Take 1 vial (2 5 mg total) by nebulization every 6 (six) hours as needed for wheezing or shortness of breath   albuterol (PROVENTIL HFA,VENTOLIN HFA) 90 mcg/act inhaler  Self No No   Sig: Inhale 2 puffs every 4 (four) hours as needed for wheezing   doxylamine (UNISON) 25 MG tablet  Self No No   Sig: Take 1 tablet (25 mg total) by mouth daily at bedtime as needed for nausea   levothyroxine 75 mcg tablet  Self No No   Sig: Take 1 tablet (75 mcg total) by mouth daily   promethazine (PHENERGAN) 12 5 MG tablet  Self No No   Sig: Take 1 tablet (12 5 mg total) by mouth every 6 (six) hours as needed for nausea or vomiting      Facility-Administered Medications: None       Past Medical History:   Diagnosis Date    Abnormal Pap smear of cervix     Anemia     Disease of thyroid gland     Fibroid     Was told during last pregnancy had fibroid  Asymtomtic    Hypothyroidism     Kidney stone     Years ago  Had during last pregnancy    Varicella     Patient did at child       Past Surgical History:   Procedure Laterality Date     SECTION  2009     SECTION  2012    CHOLECYSTECTOMY  2013    DENTAL SURGERY      [place] teeth extraction       Family History   Problem Relation Age of Onset    COPD Mother     Heart disease Mother    [place] HIV Father     Alcohol abuse Father     Substance Abuse Father     Seizures Father     Depression Father     Seizures Sister     Post-traumatic stress disorder Sister     Anxiety disorder Sister     Depression Sister     Bipolar disorder Brother     Anxiety disorder Brother     Depression Brother     Seizures Daughter     Anxiety disorder Daughter     Depression Daughter     Hypothyroidism Paternal Grandmother     Breast cancer Maternal Aunt     Colon cancer Maternal Aunt     Anxiety disorder Son     ADD / ADHD Son     Parkinsonism Maternal Grandmother     No Known Problems Maternal Grandfather     No Known Problems Paternal Grandfather     No Known Problems Half-Sister     No Known Problems Half-Sister     Diabetes Half-Brother     No Known Problems Daughter      I have reviewed and agree with the history as documented  Social History     Tobacco Use    Smoking status: Never Smoker    Smokeless tobacco: Never Used   Substance Use Topics    Alcohol use: Yes     Comment: not currently    Drug use: No        Review of Systems   Constitutional: Negative for chills and fever  HENT: Negative for congestion and rhinorrhea  Eyes: Negative for photophobia and visual disturbance  Respiratory: Negative for cough and shortness of breath  Cardiovascular: Positive for chest pain  Negative for palpitations  Gastrointestinal: Negative for abdominal pain, diarrhea, nausea and vomiting  Genitourinary: Negative for dysuria and frequency     Musculoskeletal: Negative for neck pain and neck stiffness  Skin: Negative for pallor and rash  Neurological: Positive for headaches  Negative for light-headedness  All other systems reviewed and are negative  Physical Exam  ED Triage Vitals [02/08/20 1428]   Temperature Pulse Respirations Blood Pressure SpO2   98 4 °F (36 9 °C) (!) 122 18 (!) 167/105 98 %      Temp Source Heart Rate Source Patient Position - Orthostatic VS BP Location FiO2 (%)   Oral Monitor Sitting Right arm --      Pain Score       4             Orthostatic Vital Signs  Vitals:    02/08/20 1600 02/08/20 1730 02/08/20 1915 02/08/20 2044   BP: 112/72 129/83 134/70 138/79   Pulse: 96 100 (!) 120 100   Patient Position - Orthostatic VS: Lying Sitting Sitting Sitting       Physical Exam   Constitutional: She is oriented to person, place, and time  Awake and alert, well appearing, no acute distress  Nontoxic in appearance  Mental status appropriate   HENT:   Head: Normocephalic and atraumatic  Mouth/Throat: Oropharynx is clear and moist  No oropharyngeal exudate  Eyes: Pupils are equal, round, and reactive to light  EOM are normal  Right eye exhibits no discharge  Left eye exhibits no discharge  No scleral icterus  Neck: Normal range of motion  Neck supple  No JVD present  No tracheal deviation present  Cardiovascular: Normal rate, regular rhythm and normal heart sounds  No murmur heard  Pulmonary/Chest: Effort normal  No stridor  No respiratory distress  She has no wheezes  She has no rales  Abdominal: Soft  She exhibits no distension and no mass  There is no tenderness  Musculoskeletal: Normal range of motion  She exhibits no edema or deformity  Chest pains tender to palpation  No edema, or tenderness to the bilateral lower extremities  There is an area of erythema, and induration without fluctuance over the posterior aspect of the left arm, at the area of the deltoid  Neurological: She is alert and oriented to person, place, and time   No cranial nerve deficit  She exhibits normal muscle tone  Strength is 5/5 and equal in all extremities bilaterally  Sensation grossly intact and equal in all extremities  No dysmetria finger-to-nose testing  No cranial nerve deficits appreciated   Skin: Skin is warm and dry  No rash noted  No pallor  ED Medications  Medications   oxyCODONE (ROXICODONE) IR tablet 5 mg (5 mg Oral Given 2/8/20 1909)   acetaminophen (TYLENOL) tablet 975 mg (975 mg Oral Given 2/8/20 1909)   multi-electrolyte (ISOLYTE-S PH 7 4) bolus 1,000 mL (0 mL Intravenous Stopped 2/8/20 2025)       Diagnostic Studies  Results Reviewed     Procedure Component Value Units Date/Time    Urine Microscopic [[id number]]  (Abnormal) Collected:  02/08/20 2025    Lab Status:  Final result Specimen:  Urine, Clean Catch Updated:  02/08/20 2105     RBC, UA None Seen /hpf      WBC, UA 2-4 /hpf      Epithelial Cells Moderate /hpf      Bacteria, UA Occasional /hpf      Hyaline Casts, UA 10-25 /lpf      URINE COMMENT --    UA w Reflex to Microscopic w Reflex to Culture [[id number]]  (Abnormal) Collected:  02/08/20 2025    Lab Status:  Final result Specimen:  Urine, Clean Catch Updated:  02/08/20 2101     Color, UA Yellow     Clarity, UA Cloudy     Specific Gravity, UA 1 025     pH, UA 6 0     Leukocytes, UA Negative     Nitrite, UA Negative     Protein, UA Trace mg/dl      Glucose, UA Negative mg/dl      Ketones, UA 40 (2+) mg/dl      Urobilinogen, UA 0 2 E U /dl      Bilirubin, UA Negative     Blood, UA Negative     URINE COMMENT --    Urine culture [[id number]] Collected:  02/08/20 2025    Lab Status:   In process Specimen:  Urine, Clean Catch Updated:  02/08/20 2101    Troponin I [[id number]]  (Normal) Collected:  02/08/20 1956    Lab Status:  Final result Specimen:  Blood from Arm, Left Updated:  02/08/20 2032     Troponin I <0 02 ng/mL     Comprehensive metabolic panel [[id number]]  (Abnormal) Collected:  02/08/20 1739    Lab Status:  Final result Specimen: Blood from Arm, Right Updated:  02/08/20 1822     Sodium 137 mmol/L      Potassium 5 1 mmol/L      Chloride 110 mmol/L      CO2 21 mmol/L      ANION GAP 6 mmol/L      BUN 6 mg/dL      Creatinine 0 57 mg/dL      Glucose 80 mg/dL      Calcium 9 1 mg/dL      AST 36 U/L      ALT 15 U/L      Alkaline Phosphatase 131 U/L      Total Protein 7 4 g/dL      Albumin 2 1 g/dL      Total Bilirubin 0 35 mg/dL      eGFR 116 ml/min/1 73sq m     Narrative:       [place] guidelines for Chronic Kidney Disease (CKD):     Stage 1 with normal or high GFR (GFR > 90 mL/min/1 73 square meters)    Stage 2 Mild CKD (GFR = 60-89 mL/min/1 73 square meters)    Stage 3A Moderate CKD (GFR = 45-59 mL/min/1 73 square meters)    Stage 3B Moderate CKD (GFR = 30-44 mL/min/1 73 square meters)    Stage 4 Severe CKD (GFR = 15-29 mL/min/1 73 square meters)    Stage 5 End Stage CKD (GFR <15 mL/min/1 73 square meters)  Note: GFR calculation is accurate only with a steady state creatinine    NT-BNP PRO [[id number]]  (Normal) Collected:  02/08/20 1739    Lab Status:  Final result Specimen:  Blood from Arm, Right Updated:  02/08/20 1822     NT-proBNP 31 pg/mL     Troponin I [[id number]]  (Normal) Collected:  02/08/20 1739    Lab Status:  Final result Specimen:  Blood from Arm, Right Updated:  02/08/20 1815     Troponin I <0 02 ng/mL     Magnesium [[id number]]  (Normal) Collected:  02/08/20 1739    Lab Status:  Final result Specimen:  Blood from Arm, Right Updated:  02/08/20 1810     Magnesium 1 9 mg/dL     CBC and differential [[id number]]  (Abnormal) Collected:  02/08/20 1739    Lab Status:  Final result Specimen:  Blood from Arm, Right Updated:  02/08/20 1747     WBC 8 99 Thousand/uL      RBC 3 95 Million/uL      Hemoglobin 10 4 g/dL      Hematocrit 33 2 %      MCV 84 fL      MCH 26 3 pg      MCHC 31 3 g/dL      RDW 15 9 %      MPV 10 4 fL      Platelets 300 Thousands/uL      nRBC 0 /100 WBCs      Neutrophils Relative 69 % Immat GRANS % 1 %      Lymphocytes Relative 18 %      Monocytes Relative 8 %      Eosinophils Relative 4 %      Basophils Relative 0 %      Neutrophils Absolute 6 27 Thousands/µL      Immature Grans Absolute 0 08 Thousand/uL      Lymphocytes Absolute 1 58 Thousands/µL      Monocytes Absolute 0 71 Thousand/µL      Eosinophils Absolute 0 33 Thousand/µL      Basophils Absolute 0 02 Thousands/µL                  VAS upper limb venous duplex scan, unilateral/limited    (Results Pending)   VAS lower limb venous duplex study, complete bilateral    (Results Pending)         Procedures  Procedures      ED Course                               MDM      Disposition  Final diagnoses:   Left upper arm pain   Atypical chest pain   Headache     Time reflects when diagnosis was documented in both MDM as applicable and the Disposition within this note     Time User Action Codes Description Comment    2/8/2020  7:34 PM Ema Garza Add [D93 572] Left upper arm pain     2/8/2020  7:34 PM Ema Garza Add [R07 89] Atypical chest pain     2/8/2020  7:34 PM Vera Pabon, [address] [place] [address] [R51] Headache       ED Disposition     ED Disposition Condition Date/Time Comment    Discharge Stable Sat Feb 8, 2020  7:33 PM Mello Emerson discharge to home/self care              Follow-up Information     Follow up With Specialties Details [address], [address]  [phone]            Discharge Medication List as of 2/8/2020  7:36 PM      START taking these medications    Details   cephalexin (KEFLEX) 500 mg capsule Take 1 capsule (500 mg total) by mouth every 8 (eight) hours for 7 days, Starting Sat 2/8/2020, Until Sat 2/15/2020, Print         CONTINUE these medications which have NOT CHANGED    Details   acetaminophen (TYLENOL) 500 mg tablet Take 2 tablets (1,000 mg total) by mouth every 6 (six) hours as needed for mild pain or moderate pain, Starting Sun 9/22/2019, Print      albuterol (2 5 mg/3 mL) 0 083 % nebulizer solution Take 1 vial (2 5 mg total) by nebulization every 6 (six) hours as needed for wheezing or shortness of breath, Starting Thu 3/14/2019, Normal      albuterol (PROVENTIL HFA,VENTOLIN HFA) 90 mcg/act inhaler Inhale 2 puffs every 4 (four) hours as needed for wheezing, Starting Fri 11/23/2018, Print      doxylamine (UNISON) 25 MG tablet Take 1 tablet (25 mg total) by mouth daily at bedtime as needed for nausea, Starting Sun 9/22/2019, Print      levothyroxine 75 mcg tablet Take 1 tablet (75 mcg total) by mouth daily, Starting Wed 10/23/2019, Normal      Prenatal MV-Min-FA-Omega-3 (PRENATAL GUMMIES/DHA & FA PO) Take by mouth, Historical Med      promethazine (PHENERGAN) 12 5 MG tablet Take 1 tablet (12 5 mg total) by mouth every 6 (six) hours as needed for nausea or vomiting, Starting Tue 10/15/2019, Normal           No discharge procedures on file  ED Provider  Attending physically available and evaluated Deidre Miguelhugo CARY managed the patient along with the ED Attending      Electronically Signed by         Ritu Rocha MD  02/09/20 6185

## 2020-02-10 ENCOUNTER — TELEPHONE (OUTPATIENT)
Dept: PERINATAL CARE | Facility: CLINIC | Age: 40
End: 2020-02-10

## 2020-02-10 LAB — BACTERIA UR CULT: NORMAL

## 2020-02-12 ENCOUNTER — TELEPHONE (OUTPATIENT)
Dept: PERINATAL CARE | Facility: CLINIC | Age: 40
End: 2020-02-12

## 2020-02-13 ENCOUNTER — TELEPHONE (OUTPATIENT)
Dept: OBGYN CLINIC | Facility: CLINIC | Age: 40
End: 2020-02-13

## 2020-02-13 NOTE — TELEPHONE ENCOUNTER
----- Message from RAFAELA Limon sent at 2/13/2020  1:00 PM EST -----  Regarding: f/u with patient GDM  Good afternoon,  There is a prenatal patient, Laurel Emmanuel MRN # [id number], that is 30w0d  She failed her glucose testing and was referred to diabteic education  I received a message from [facility] that she has not scheduled an appointment with them yet and that they cannot get a hold of her  Is there anyway our office can reach out to her as well  It looks like her next prenatal visit is not until next week on 2/20/20  Thank you!     Tom Vazquez

## 2020-02-13 NOTE — TELEPHONE ENCOUNTER
Certified letter regarding missed "[facility]" appointment mailed today to Pt's mailing address in EHR per RAFAELA Santana's directive

## 2020-02-18 ENCOUNTER — HOSPITAL ENCOUNTER (OUTPATIENT)
Facility: HOSPITAL | Age: 40
Discharge: HOME | End: 2020-02-18
Attending: OBSTETRICS & GYNECOLOGY | Admitting: OBSTETRICS & GYNECOLOGY
Payer: COMMERCIAL

## 2020-02-18 ENCOUNTER — TELEPHONE (OUTPATIENT)
Dept: OTHER | Facility: OTHER | Age: 40
End: 2020-02-18

## 2020-02-18 VITALS
WEIGHT: 274 LBS | SYSTOLIC BLOOD PRESSURE: 124 MMHG | DIASTOLIC BLOOD PRESSURE: 71 MMHG | TEMPERATURE: 98.4 F | BODY MASS INDEX: 50.42 KG/M2 | HEART RATE: 106 BPM | HEIGHT: 62 IN

## 2020-02-18 DIAGNOSIS — B37.3 VAGINAL CANDIDOSIS: Primary | ICD-10-CM

## 2020-02-18 PROBLEM — Z3A.30 30 WEEKS GESTATION OF PREGNANCY: Status: ACTIVE | Noted: 2019-10-23

## 2020-02-18 PROCEDURE — NC001 PR NO CHARGE: Performed by: OBSTETRICS & GYNECOLOGY

## 2020-02-18 PROCEDURE — 99214 OFFICE O/P EST MOD 30 MIN: CPT

## 2020-02-18 NOTE — PROGRESS NOTES
L&D Triage Note - OB/GYN  Jose Angel Radford 36 y o  female MRN: [id number]  Unit/Bed#: LD TRIAGE 2 Encounter: [id number]      Assessment:  36 y o  [id number] at 30w5d presenting with concern for vaginal bleeding and discharge  Plan:  1  Vaginal candidiasis: Script for [employer] sent to pharmacy  Reviewed instructions for use with pt  ENcouraged pt to complete script even if feeling better in a few days  2  Signs and symptoms of labor and rupture reviewed with pt with precautions to return  3  Keep regularly scheduled prenatal appointments  4  Discussed importance of follow up with the  center and the diabetes educators, as yeast infections can be a sign of poorly controlled glucose levels  5  Discharge to home    Discussed with Dr Angela Ghosh      ______________________________________________________________________      Chief Compliant: I had some vaginal bleeding    TIME: 08  Subjective:  36 y o  [id number] at 30w5d presents to triage with concern for vaginal bleeding  Pt reports she worked overnight last night and went to the bathroom at the end of her shift  She noticed dark red discharge upon wiping, and some blood in her underwear  She immediately presented for evaluation  Pt reports she has had no abdominal pain or discomfort overnight  +FM  Denies LOF, cramping, contractions    Complications in this pregnancy include: Advanced maternal age, hx  section x2, hypothyroidism, obesity, low lying placenta, GDM    Objective:  Vitals:    20 0819   Temp: 98 4 °F (36 9 °C)       Physical Exam:    SVE: Deferred  FHT:  140 / Moderate 6 - 25 bpm / +accels, -decels  [place]: Quiet  SSE: External genitalia grossly normal in appearance  On speculum examination, a large amount of thick, white discharge is noted  No blood whatsoever appreciated within the vault  Cervix does not appear friable, appears visually closed  Large amount of hyphae on KOH slide  No clue cells or trichomonads on wet mount  Gideon Adam 47 DO Prabhakar 2/18/2020 8:56 AM

## 2020-02-20 ENCOUNTER — ROUTINE PRENATAL (OUTPATIENT)
Dept: OBGYN CLINIC | Facility: CLINIC | Age: 40
End: 2020-02-20

## 2020-02-20 VITALS — SYSTOLIC BLOOD PRESSURE: 120 MMHG | BODY MASS INDEX: 51.58 KG/M2 | DIASTOLIC BLOOD PRESSURE: 78 MMHG | WEIGHT: 282 LBS

## 2020-02-20 DIAGNOSIS — O24.419 GESTATIONAL DIABETES MELLITUS (GDM) IN THIRD TRIMESTER, GESTATIONAL DIABETES METHOD OF CONTROL UNSPECIFIED: ICD-10-CM

## 2020-02-20 DIAGNOSIS — Z34.83 PRENATAL CARE, SUBSEQUENT PREGNANCY, THIRD TRIMESTER: Primary | ICD-10-CM

## 2020-02-20 LAB
SL AMB  POCT GLUCOSE, UA: NEGATIVE
SL AMB POCT URINE PROTEIN: NEGATIVE

## 2020-02-20 PROCEDURE — PNV: Performed by: OBSTETRICS & GYNECOLOGY

## 2020-02-20 NOTE — PROGRESS NOTES
Patient was seen in ER after she got cellulitis from the Tdap vaccine  She was given dose of antibiotics and developed vaginal irritation from it  She will start treatment soon for this issue  S/p Tdap and Flu shot  Appointment with the gestational diabetes counselor tomorrow  Patient requests tubal during her   She just received her breast pump

## 2020-02-20 NOTE — PROGRESS NOTES
Carinjaz Howardmarge is doing okay  Bleeding has resolved  Completed her antibiotics, and is going to finish her monistat  Discussed GDM - has follow up tomorrow  Plan for RLTCS and Salpingectomy

## 2020-02-21 ENCOUNTER — OFFICE VISIT (OUTPATIENT)
Dept: PERINATAL CARE | Facility: CLINIC | Age: 40
End: 2020-02-21
Payer: COMMERCIAL

## 2020-02-21 VITALS
HEART RATE: 100 BPM | BODY MASS INDEX: 51.75 KG/M2 | SYSTOLIC BLOOD PRESSURE: 118 MMHG | WEIGHT: 281.2 LBS | DIASTOLIC BLOOD PRESSURE: 74 MMHG | HEIGHT: 62 IN

## 2020-02-21 DIAGNOSIS — E03.9 HYPOTHYROIDISM, UNSPECIFIED TYPE: ICD-10-CM

## 2020-02-21 DIAGNOSIS — O24.419 GESTATIONAL DIABETES MELLITUS (GDM) IN SECOND TRIMESTER, GESTATIONAL DIABETES METHOD OF CONTROL UNSPECIFIED: Primary | ICD-10-CM

## 2020-02-21 DIAGNOSIS — Z3A.31 31 WEEKS GESTATION OF PREGNANCY: ICD-10-CM

## 2020-02-21 DIAGNOSIS — O99.213 OBESITY AFFECTING PREGNANCY IN THIRD TRIMESTER: ICD-10-CM

## 2020-02-21 PROCEDURE — G0108 DIAB MANAGE TRN  PER INDIV: HCPCS

## 2020-02-21 RX ORDER — BLOOD SUGAR DIAGNOSTIC
STRIP MISCELLANEOUS
Qty: 100 EACH | Refills: 4 | Status: SHIPPED | OUTPATIENT
Start: 2020-02-21 | End: 2020-04-06 | Stop reason: ALTCHOICE

## 2020-02-21 RX ORDER — LANCETS 33 GAUGE
EACH MISCELLANEOUS
Qty: 100 EACH | Refills: 4 | Status: SHIPPED | OUTPATIENT
Start: 2020-02-21 | End: 2020-04-06 | Stop reason: ALTCHOICE

## 2020-02-21 NOTE — PROGRESS NOTES
20  Diana Arm   1980  Estimated Date of Delivery: 20   EGA: 31w1d  Referring Provider: Tiesha Martines    Thank you for referring your patient to the Diabetes and Pregnancy Program at [address] [place] [address]  The patient had several concerns given diagnosis and glucose tolerance test results  Results of glucose tolerance tests were reviewed with patient and her   She reported having discussed these results with her OB  The patient attended a class 1 individual appointment and patient received the following education:     Pathophysiology of diabetes and pregnancy  This includes maternal-fetal complications such as fetal macrosomia,  hypoglycemia, polyhydramnios, increased incidence of  section, pre-term labor and in severe cases, fetal demise and stillbirth   Instruction on diet and glucometer use was provided  Self-monitoring of blood glucose levels: fasting (goal 60mg/dl to 90mg/dl) and two hours after the start of the meal less (goal less than 120mg/dl)  The patient was provided with a OneTouch Verio blood glucose meter and supplies based on insurance coverage  The patient expressed financial concerns regarding purchasing meter supplies  If the patient is unable to afford the cost of meter supplies, [employer] Confirm was suggested   Medical Nutrition Therapy for diabetes and pregnancy  The patient was provided with a 2000 calorie meal plan and the following was reviewed:     o Basic review of macronutrients   o Meal pattern should consist of three small meals and three snacks daily  o Carbohydrate gram amounts per meal   o Instructions on how to read a food label  o Appropriate serving sizes for carbohydrates and proteins  o Incorporating protein at each meal and snack  o Maintain a three day food diary and bring to class 2   o The patient reported having followed a Ketogenic diet prior to pregnancy with a subsequent 100 pound weight loss  The patient also 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  o Report blood glucose levels to the [address] weekly or as directed:  o Phone : [phone]  If no response in 24 hours, call [phone]   o Fax: [phone]  o Email: [email or web address]  The patient is scheduled to attend class 2 individual appointment on 2/28/20  Additionally, fetal ultrasound evaluation by the Perinatologist has been scheduled to assure continuity of care  Please contact the Diabetes and Pregnancy Program at [phone] if you have any questions  Time spent with patient 9:00-10:00 AM; time spent face to face counseling greater than 50% of the appointment      Dennis Baldwin RD,LDN,CDE  Diabetes Educator   Diabetes [facility] Pregnancy Program

## 2020-02-28 ENCOUNTER — OFFICE VISIT (OUTPATIENT)
Dept: PERINATAL CARE | Facility: CLINIC | Age: 40
End: 2020-02-28
Payer: COMMERCIAL

## 2020-02-28 ENCOUNTER — ULTRASOUND (OUTPATIENT)
Dept: PERINATAL CARE | Facility: CLINIC | Age: 40
End: 2020-02-28
Payer: COMMERCIAL

## 2020-02-28 ENCOUNTER — DOCUMENTATION (OUTPATIENT)
Dept: PERINATAL CARE | Facility: CLINIC | Age: 40
End: 2020-02-28

## 2020-02-28 VITALS — SYSTOLIC BLOOD PRESSURE: 138 MMHG | DIASTOLIC BLOOD PRESSURE: 78 MMHG

## 2020-02-28 VITALS
SYSTOLIC BLOOD PRESSURE: 138 MMHG | BODY MASS INDEX: 51.19 KG/M2 | HEIGHT: 62 IN | WEIGHT: 278.2 LBS | DIASTOLIC BLOOD PRESSURE: 78 MMHG | HEART RATE: 106 BPM

## 2020-02-28 DIAGNOSIS — O24.410 DIET CONTROLLED GESTATIONAL DIABETES MELLITUS (GDM) IN THIRD TRIMESTER: ICD-10-CM

## 2020-02-28 DIAGNOSIS — O24.410 DIET CONTROLLED GESTATIONAL DIABETES MELLITUS (GDM) IN THIRD TRIMESTER: Primary | ICD-10-CM

## 2020-02-28 DIAGNOSIS — Z98.891 PREVIOUS CESAREAN SECTION: ICD-10-CM

## 2020-02-28 DIAGNOSIS — E03.9 HYPOTHYROIDISM, UNSPECIFIED TYPE: ICD-10-CM

## 2020-02-28 DIAGNOSIS — O99.213 OBESITY AFFECTING PREGNANCY IN THIRD TRIMESTER: ICD-10-CM

## 2020-02-28 DIAGNOSIS — Z3A.32 32 WEEKS GESTATION OF PREGNANCY: ICD-10-CM

## 2020-02-28 DIAGNOSIS — Z3A.32 32 WEEKS GESTATION OF PREGNANCY: Primary | ICD-10-CM

## 2020-02-28 DIAGNOSIS — O09.523 MULTIGRAVIDA OF ADVANCED MATERNAL AGE IN THIRD TRIMESTER: ICD-10-CM

## 2020-02-28 PROCEDURE — 76811 OB US DETAILED SNGL FETUS: CPT | Performed by: OBSTETRICS & GYNECOLOGY

## 2020-02-28 PROCEDURE — 76817 TRANSVAGINAL US OBSTETRIC: CPT | Performed by: OBSTETRICS & GYNECOLOGY

## 2020-02-28 PROCEDURE — G0108 DIAB MANAGE TRN  PER INDIV: HCPCS

## 2020-02-28 RX ORDER — LEVOTHYROXINE SODIUM 0.07 MG/1
75 TABLET ORAL DAILY
Qty: 30 TABLET | Refills: 1 | Status: SHIPPED | OUTPATIENT
Start: 2020-02-28 | End: 2020-11-13 | Stop reason: SDUPTHER

## 2020-02-28 NOTE — PROGRESS NOTES
A transvaginal ultrasound was performed  Sonographer note on use of High Level Disinfection process (Trophon) for transvaginal probe #1 used, serial [id number]     [place] GUZMAN, DANIEL, RVT

## 2020-02-28 NOTE — PROGRESS NOTES
DATE:  20  RE: Agustina Ernandez    : 1980    KONRAD: Estimated Date of Delivery: 20    EGA: 32w1d  Referring Provider: Tyrell Andujar      Thank you for referring your patient to the Diabetes and Pregnancy Program at [address] [place] [address]  The patient attended Class 2 received the following education:    Weight gain during in pregnancy  Based on the patients height of 62 inches, pre-pregnancy weight of 108 kg (237 lb) pounds 43 3 BMI, we would recommend a total weight gain of no more than 11-20 pounds for the pregnancy   The patients current weight is 282 pounds, and her weight gain to date is 45 pounds  Based on this, we recommend minimal weight gain for the remainder of the pregnancy   Medical Nutrition Therapy for diabetes and pregnancy  The patients three day food diary was reviewed and discussed  The patient was instructed on the following:  o Individualized meal plan    o Use of food diary to maintain a meal plan    o Importance of protein as it relates to blood glucose control   Review of blood glucose log  Reinforcement of blood glucose goals and reporting guidelines   Ultrasounds every four weeks in the [facility] to evaluate fetal growth   Exercise Guidelines:   o Walking up to thirty minutes daily can reduce blood glucose levels  o Monitor for greater than four contractions per hour     o The patient has been instructed not to begin physical activity if she has been instructed not to exercise by your office   Sick day guidelines and hypoglycemia with treatment   Post-partum guidelines:  o Completion of a 75 gram glucose tolerance test at 6 weeks post-partum to check for type 2 diabetes  o 20% weight loss and 30 minutes of exercise 5 times per week reduces the risk of type 2 diabetes   Breastfeeding guidelines   Report blood glucose levels to [facility] weekly or as directed  o Phone: [phone]   If no response in 24 hours, call [phone]    o Fax: [phone]  o Email: [email or web address]    Please contact the Diabetes and Pregnancy Program at [phone] if you have questions  Time spent with patient 9:00-10:00 AM; time spent face to face counseling greater than 50% of the appointment    ,     Fredrick Mae RD,LDN,CDE  Diabetes Educator  Diabetes and Pregnancy Program

## 2020-02-28 NOTE — PROGRESS NOTES
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[employer] [employer]  Works with 8:30 PM to 7:00 AM or 2:30 PM- 1AM   Patient reports following a Ketogenic diet and approximately 100 pound weight loss prior to pregnancy     Current regimen:   2000 calorie gestational meal plan; 3 meals and 3 snacks daily  SMBG 4 times per day; fasting and 2 hrs pp with a OneTouch Verio blood glucose meter  Plan:  Patient has a better understanding of diet following class 2 today  She has also been able to grocery shop for foods more appropriate  Food budget is a concerns  Suggestions were provided especially protein sources to include on a budget  Patient receives [facility]  Patient also reported sleep deprivation as well as stress within the family that may cause some elevations  Continue diet including protein in all meals and snacks  Bedtime snack was changed to 1 serving of carbohydrate and 2-3 ounces of protein  Continue SMBG 4 times per day; fasting and 2 hrs pp  Maintain an 8-10 hr fast prior to FBG measurement  Walk 20-30 minutes per day if there are no exercise restrictions from OB  Patient was advised in future if blood glucose values continue to be consistently above target range that medications may need to be added to regimen  20 US impression: fetal growth and MADDISON appeared normal  Pending today's US results          Date due to report next:  Thursday, 3/5    Shireen Liz  Diabetes Educator  [facility]

## 2020-03-02 PROBLEM — Z3A.32 32 WEEKS GESTATION OF PREGNANCY: Status: ACTIVE | Noted: 2019-10-23

## 2020-03-06 ENCOUNTER — TELEPHONE (OUTPATIENT)
Dept: OBGYN CLINIC | Facility: CLINIC | Age: 40
End: 2020-03-06

## 2020-03-06 ENCOUNTER — ROUTINE PRENATAL (OUTPATIENT)
Dept: OBGYN CLINIC | Facility: CLINIC | Age: 40
End: 2020-03-06

## 2020-03-06 VITALS — DIASTOLIC BLOOD PRESSURE: 80 MMHG | WEIGHT: 278 LBS | BODY MASS INDEX: 50.85 KG/M2 | SYSTOLIC BLOOD PRESSURE: 116 MMHG

## 2020-03-06 DIAGNOSIS — Z3A.33 33 WEEKS GESTATION OF PREGNANCY: ICD-10-CM

## 2020-03-06 DIAGNOSIS — O99.213 OBESITY AFFECTING PREGNANCY IN THIRD TRIMESTER: ICD-10-CM

## 2020-03-06 DIAGNOSIS — O24.410 DIET CONTROLLED GESTATIONAL DIABETES MELLITUS (GDM) IN THIRD TRIMESTER: ICD-10-CM

## 2020-03-06 DIAGNOSIS — Z98.891 PREVIOUS CESAREAN SECTION: ICD-10-CM

## 2020-03-06 DIAGNOSIS — E03.9 HYPOTHYROIDISM, UNSPECIFIED TYPE: ICD-10-CM

## 2020-03-06 DIAGNOSIS — Z34.83 PRENATAL CARE, SUBSEQUENT PREGNANCY, THIRD TRIMESTER: ICD-10-CM

## 2020-03-06 DIAGNOSIS — O09.523 MULTIGRAVIDA OF ADVANCED MATERNAL AGE IN THIRD TRIMESTER: Primary | ICD-10-CM

## 2020-03-06 DIAGNOSIS — O44.42 LOW LYING PLACENTA NOS OR WITHOUT HEMORRHAGE, SECOND TRIMESTER: ICD-10-CM

## 2020-03-06 LAB
SL AMB  POCT GLUCOSE, UA: NORMAL
SL AMB POCT URINE PROTEIN: NORMAL

## 2020-03-06 PROCEDURE — PNV: Performed by: NURSE PRACTITIONER

## 2020-03-06 NOTE — ASSESSMENT & PLAN NOTE
Continues to report blood sugars weekly to diabetic pathways  Currently diet controlled  Fasting blood sugars are slightly elevated typically between

## 2020-03-06 NOTE — ASSESSMENT & PLAN NOTE
Denies OB complaints  Good fetal movement  Denies contractions, cramping, leakage of fluid or vaginal bleeding  S/p flu/Tdap vaccines  Reviewed  labor precautions, reasons to call, and FKCs  RTO in 2 weeks

## 2020-03-06 NOTE — PROGRESS NOTES
Problem List Items Addressed This Visit        Endocrine    Hypothyroidism     Last TSH 1 54  Continues with Levothyroxine 75mcg PO daily  Gestational diabetes mellitus (GDM) in third trimester     Continues to report blood sugars weekly to diabetic pathways  Currently diet controlled  Fasting blood sugars are slightly elevated typically between   Other    AMA (advanced maternal age) multigravida 33+ - Primary     Denies OB complaints  Good fetal movement  Denies contractions, cramping, leakage of fluid or vaginal bleeding  S/p flu/Tdap vaccines  Reviewed  labor precautions, reasons to call, and FKCs  RTO in 2 weeks  Previous  section     Plan for repeat  and tubal ligation  She would like  scheduled at 39w0d on 20 if possible  Message sent to staff to facilitate scheduling of this  Obesity affecting pregnancy    33 weeks gestation of pregnancy    Low lying placenta nos or without hemorrhage, second trimester     Placenta is no longer low-lying             Other Visit Diagnoses     Prenatal care, subsequent pregnancy, third trimester        Relevant Orders    POCT urine dip (Completed)

## 2020-03-06 NOTE — PROGRESS NOTES
Patient is doing well; no complaints  GFM  Reports blood sugars have been fine  Delivering at [place]

## 2020-03-06 NOTE — ASSESSMENT & PLAN NOTE
Plan for repeat  and tubal ligation  She would like  scheduled at 39w0d on 20 if possible  Message sent to staff to facilitate scheduling of this

## 2020-03-09 ENCOUNTER — DOCUMENTATION (OUTPATIENT)
Dept: OBGYN CLINIC | Facility: CLINIC | Age: 40
End: 2020-03-09

## 2020-03-20 ENCOUNTER — TELEPHONE (OUTPATIENT)
Dept: OBGYN CLINIC | Facility: MEDICAL CENTER | Age: 40
End: 2020-03-20

## 2020-03-20 ENCOUNTER — ROUTINE PRENATAL (OUTPATIENT)
Dept: OBGYN CLINIC | Facility: CLINIC | Age: 40
End: 2020-03-20

## 2020-03-20 VITALS — WEIGHT: 286.8 LBS | DIASTOLIC BLOOD PRESSURE: 82 MMHG | BODY MASS INDEX: 52.46 KG/M2 | SYSTOLIC BLOOD PRESSURE: 128 MMHG

## 2020-03-20 DIAGNOSIS — Z98.891 PREVIOUS CESAREAN SECTION: ICD-10-CM

## 2020-03-20 DIAGNOSIS — O09.523 MULTIGRAVIDA OF ADVANCED MATERNAL AGE IN THIRD TRIMESTER: Primary | ICD-10-CM

## 2020-03-20 DIAGNOSIS — Z3A.35 35 WEEKS GESTATION OF PREGNANCY: ICD-10-CM

## 2020-03-20 DIAGNOSIS — O24.410 DIET CONTROLLED GESTATIONAL DIABETES MELLITUS (GDM) IN THIRD TRIMESTER: ICD-10-CM

## 2020-03-20 DIAGNOSIS — O99.213 OBESITY AFFECTING PREGNANCY, ANTEPARTUM, THIRD TRIMESTER: ICD-10-CM

## 2020-03-20 LAB
SL AMB  POCT GLUCOSE, UA: NORMAL
SL AMB POCT URINE PROTEIN: NORMAL

## 2020-03-20 PROCEDURE — PNV: Performed by: NURSE PRACTITIONER

## 2020-03-20 NOTE — ASSESSMENT & PLAN NOTE
Denies OB complaints  Good fetal movement  Denies contractions, cramping, leakage of fluid or vaginal bleeding  Discussed GBS at next visit  S/p flu/Tdap vaccines  Reviewed  labor precautions, reasons to call, and FKCs  RTO in 1 week  Should begin  surveillance at 36 weeks d/t BMI 52 46 and AMA age 36  Referral placed for MFM today

## 2020-03-20 NOTE — TELEPHONE ENCOUNTER
Per provider Ronna-She needs to begin  surveillance weekly beginning at 36 weeks d/t obesity > 40 and AMA  Thank you!

## 2020-03-20 NOTE — TELEPHONE ENCOUNTER
Called patient and was sent to voicemail  I left her a message regarding recommendations of  surveillance beginning at 39 weeks with MFM d/t obesity and AMA  Recommend that patient call back either today until 4pm or on Monday from 7am-5pm to speak to myself and would be happy to further explain the recommendations

## 2020-03-20 NOTE — PROGRESS NOTES
Problem List Items Addressed This Visit        Endocrine    Gestational diabetes mellitus (GDM) in third trimester     Continues to report blood sugars weekly to MFM through fax  Currently diet controlled  Other    AMA (advanced maternal age) multigravida 33+ - Primary     Denies OB complaints  Good fetal movement  Denies contractions, cramping, leakage of fluid or vaginal bleeding  Discussed GBS at next visit  S/p flu/Tdap vaccines  Reviewed  labor precautions, reasons to call, and FKCs  RTO in 1 week  Should begin  surveillance at 36 weeks d/t BMI 52 46 and AMA age 36  Referral placed for MFM today  Relevant Orders    POCT urine dip    Ambulatory Referral to Maternal Fetal Medicine    Previous  section      with tubal scheduled for  at 12:30pm          Obesity affecting pregnancy, antepartum, third trimester     Recommended beginning weekly  surveillance at 36 weeks  Referral for MFM placed           Relevant Orders    Ambulatory Referral to Maternal Fetal Medicine    35 weeks gestation of pregnancy

## 2020-03-20 NOTE — TELEPHONE ENCOUNTER
Pt is concerned why she is getting a call from [facility] to schedule another u/s because they received a referral from [facility]  Please advise

## 2020-03-22 ENCOUNTER — HOSPITAL ENCOUNTER (OUTPATIENT)
Facility: HOSPITAL | Age: 40
Setting detail: OBSERVATION
Discharge: HOME | End: 2020-03-23
Attending: OBSTETRICS & GYNECOLOGY | Admitting: OBSTETRICS & GYNECOLOGY
Payer: COMMERCIAL

## 2020-03-22 DIAGNOSIS — O21.9 NAUSEA AND VOMITING DURING PREGNANCY: ICD-10-CM

## 2020-03-22 PROBLEM — O14.93 PRE-ECLAMPSIA IN THIRD TRIMESTER: Status: ACTIVE | Noted: 2020-03-22

## 2020-03-22 LAB
ALBUMIN SERPL BCP-MCNC: 2 G/DL (ref 3.5–5)
ALP SERPL-CCNC: 165 U/L (ref 46–116)
ALT SERPL W P-5'-P-CCNC: 14 U/L (ref 12–78)
ANION GAP SERPL CALCULATED.3IONS-SCNC: 14 MMOL/L (ref 4–13)
AST SERPL W P-5'-P-CCNC: 11 U/L (ref 5–45)
BACTERIA UR QL AUTO: ABNORMAL /HPF
BILIRUB SERPL-MCNC: 0.23 MG/DL (ref 0.2–1)
BILIRUB UR QL STRIP: NEGATIVE
BUN SERPL-MCNC: 11 MG/DL (ref 5–25)
CALCIUM SERPL-MCNC: 8.8 MG/DL (ref 8.3–10.1)
CHLORIDE SERPL-SCNC: 104 MMOL/L (ref 100–108)
CLARITY UR: CLEAR
CO2 SERPL-SCNC: 20 MMOL/L (ref 21–32)
COLOR UR: YELLOW
CREAT SERPL-MCNC: 0.77 MG/DL (ref 0.6–1.3)
CREAT UR-MCNC: 90 MG/DL
ERYTHROCYTE [DISTWIDTH] IN BLOOD BY AUTOMATED COUNT: 15.4 % (ref 11.6–15.1)
GFR SERPL CREATININE-BSD FRML MDRD: 97 ML/MIN/1.73SQ M
GLUCOSE P FAST SERPL-MCNC: 98 MG/DL (ref 65–99)
GLUCOSE SERPL-MCNC: 142 MG/DL (ref 65–140)
GLUCOSE SERPL-MCNC: 98 MG/DL (ref 65–140)
GLUCOSE UR STRIP-MCNC: NEGATIVE MG/DL
HCT VFR BLD AUTO: 33 % (ref 34.8–46.1)
HGB BLD-MCNC: 10.1 G/DL (ref 11.5–15.4)
HGB UR QL STRIP.AUTO: NEGATIVE
KETONES UR STRIP-MCNC: NEGATIVE MG/DL
LEUKOCYTE ESTERASE UR QL STRIP: NEGATIVE
MCH RBC QN AUTO: 25 PG (ref 26.8–34.3)
MCHC RBC AUTO-ENTMCNC: 30.6 G/DL (ref 31.4–37.4)
MCV RBC AUTO: 82 FL (ref 82–98)
NITRITE UR QL STRIP: NEGATIVE
NON-SQ EPI CELLS URNS QL MICRO: ABNORMAL /HPF
PH UR STRIP.AUTO: 6.5 [PH]
PLATELET # BLD AUTO: 306 THOUSANDS/UL (ref 149–390)
PMV BLD AUTO: 10.7 FL (ref 8.9–12.7)
POTASSIUM SERPL-SCNC: 3.7 MMOL/L (ref 3.5–5.3)
PROT SERPL-MCNC: 7.5 G/DL (ref 6.4–8.2)
PROT UR STRIP-MCNC: ABNORMAL MG/DL
PROT UR-MCNC: 78 MG/DL
PROT/CREAT UR: 0.87 MG/G{CREAT} (ref 0–0.1)
RBC # BLD AUTO: 4.04 MILLION/UL (ref 3.81–5.12)
RBC #/AREA URNS AUTO: ABNORMAL /HPF
SODIUM SERPL-SCNC: 138 MMOL/L (ref 136–145)
SP GR UR STRIP.AUTO: 1.02 (ref 1–1.03)
UROBILINOGEN UR QL STRIP.AUTO: 0.2 E.U./DL
WBC # BLD AUTO: 11.84 THOUSAND/UL (ref 4.31–10.16)
WBC #/AREA URNS AUTO: ABNORMAL /HPF

## 2020-03-22 PROCEDURE — 84156 ASSAY OF PROTEIN URINE: CPT | Performed by: OBSTETRICS & GYNECOLOGY

## 2020-03-22 PROCEDURE — 82570 ASSAY OF URINE CREATININE: CPT | Performed by: OBSTETRICS & GYNECOLOGY

## 2020-03-22 PROCEDURE — 81001 URINALYSIS AUTO W/SCOPE: CPT | Performed by: OBSTETRICS & GYNECOLOGY

## 2020-03-22 PROCEDURE — 82948 REAGENT STRIP/BLOOD GLUCOSE: CPT

## 2020-03-22 PROCEDURE — 99213 OFFICE O/P EST LOW 20 MIN: CPT

## 2020-03-22 PROCEDURE — 99218 PR INITIAL OBSERVATION CARE/DAY 30 MINUTES: CPT | Performed by: OBSTETRICS & GYNECOLOGY

## 2020-03-22 PROCEDURE — 80053 COMPREHEN METABOLIC PANEL: CPT | Performed by: OBSTETRICS & GYNECOLOGY

## 2020-03-22 PROCEDURE — 85027 COMPLETE CBC AUTOMATED: CPT | Performed by: OBSTETRICS & GYNECOLOGY

## 2020-03-22 RX ORDER — ALBUTEROL SULFATE 90 UG/1
2 AEROSOL, METERED RESPIRATORY (INHALATION) EVERY 4 HOURS PRN
Status: DISCONTINUED | OUTPATIENT
Start: 2020-03-22 | End: 2020-03-23 | Stop reason: HOSPADM

## 2020-03-22 RX ORDER — BETAMETHASONE SODIUM PHOSPHATE AND BETAMETHASONE ACETATE 3; 3 MG/ML; MG/ML
12 INJECTION, SUSPENSION INTRA-ARTICULAR; INTRALESIONAL; INTRAMUSCULAR; SOFT TISSUE EVERY 24 HOURS
Status: DISCONTINUED | OUTPATIENT
Start: 2020-03-22 | End: 2020-03-23

## 2020-03-22 RX ORDER — LEVOTHYROXINE SODIUM 0.07 MG/1
75 TABLET ORAL
Status: DISCONTINUED | OUTPATIENT
Start: 2020-03-23 | End: 2020-03-23 | Stop reason: HOSPADM

## 2020-03-22 RX ORDER — CALCIUM CARBONATE 200(500)MG
500 TABLET,CHEWABLE ORAL DAILY PRN
Status: DISCONTINUED | OUTPATIENT
Start: 2020-03-22 | End: 2020-03-23 | Stop reason: HOSPADM

## 2020-03-22 RX ORDER — ACETAMINOPHEN 325 MG/1
975 TABLET ORAL EVERY 6 HOURS PRN
Status: DISCONTINUED | OUTPATIENT
Start: 2020-03-22 | End: 2020-03-23 | Stop reason: HOSPADM

## 2020-03-22 RX ADMIN — CALCIUM CARBONATE (ANTACID) CHEW TAB 500 MG 500 MG: 500 CHEW TAB at 23:20

## 2020-03-22 RX ADMIN — BETAMETHASONE SODIUM PHOSPHATE AND BETAMETHASONE ACETATE 12 MG: 3; 3 INJECTION, SUSPENSION INTRA-ARTICULAR; INTRALESIONAL; INTRAMUSCULAR at 15:05

## 2020-03-22 RX ADMIN — ACETAMINOPHEN 975 MG: 325 TABLET, FILM COATED ORAL at 16:38

## 2020-03-22 NOTE — H&P
[place] [age] y o  female MRN: [id number]  Unit/Bed#: LD TRIAGE  Encounter: [id number]      Assessment: 36 y o  [id number] at 35w3d admitted for blood pressure monitoring for newly diagnosed pre-eclampsia w/o SF  Pregnancy also complicated by: AMA, B0QDN, hx of prior  x2  SVE: 0/0/-4  FHT: Category I        Plan:     Pre-eclampsia w/o SF: urine P/C 0 87, other labs WNL; BTM 3/22-3/23; monitor BPs  A1GDM: fasting + 2hr PP  Back pain: Tylenol 975mg PRN, aqua K pad; no contractions on toco; SVE 0/0/-4  IUP at 35w: NST q shift  Asthma: albuterol PRN                      Hypothyroidism: levothyroxine 75mcg    FEN: diabetic diet; hep lock             DVT ppx: SCDs      D/w Dr Sushma Caceres:    Chief Complaint: back pain and vaginal pressure    HPI: Marion Velásquez is a 36 y o  [id number] with an KONRAD of 2020, by Last Menstrual Period at 35w3d who is being admitted for blood pressure monitoring  She reports the pain started this morning at around 4am after getting back from work  She also feels vaginal pressure  Denies vaginal bleeding or leakage of fluid  She reports regular/good fetal movement  She denies actual contraction  She says what most bothers her is her back  She first went to [facility] ED and Dr Devorah Frances performed a cervical exam, she was closed/thick/high  In the ED there, she had a BP of 164/104  Review of her prenatal records show she meets criteria for gestational HTN  Her first BP in triage was 179/93  Give these severely elevated blood pressures, pre-eclamptic labs were ordered and IV access was established    She denies headache, changes in vision or RUQ pain    Pregnancy complications: I8AFF, asthma, hypothyroidism, BMI 46, AMA    Patient Active Problem List   Diagnosis    AMA (advanced maternal age) multigravida 33+    Previous  section    Hypothyroidism    Asthma    Obesity affecting pregnancy, antepartum, third trimester    35 weeks gestation of pregnancy    Low lying placenta nos or without hemorrhage, second trimester    Gestational diabetes mellitus (GDM) in third trimester    Pre-eclampsia in third trimester       Baby complications/comments: none    Review of Systems    OB History    Para Term  AB Living   5 3 3 0 1 3   SAB TAB Ectopic Multiple Live Births   1 0 0 0 3      # Outcome Date GA Lbr Rohit/2nd Weight Sex Delivery Anes PTL Lv   5 Current            4 SAB 10/2017           3 Term 12 39w0d  3459 g (7 lb 10 oz) F CS-Unspec Spinal N SHARA      Birth Comments: FOB#2   2 Term 05 39w0d  3090 g (6 lb 13 oz) M CS-Unspec Spinal N SHARA      Birth Comments: FOB#1      Complications: Cord prolapse   1 Term 99 40w0d  3062 g (6 lb 12 oz) F Vag-Spont None N SHARA      Birth Comments: FOB # 1       Past Medical History:   Diagnosis Date    Abnormal Pap smear of cervix     Anemia     Disease of thyroid gland     Fibroid     Was told during last pregnancy had fibroid  Asymtomtic    Hypothyroidism     Kidney stone     Years ago   Had during last pregnancy    Varicella     Patient did at child       Past Surgical History:   Procedure Laterality Date     SECTION  2009     SECTION  2012    CHOLECYSTECTOMY  2013    DENTAL SURGERY      Keiser teeth extraction       Social History     Tobacco Use    Smoking status: Never Smoker    Smokeless tobacco: Never Used   Substance Use Topics    Alcohol use: Yes     Comment: not currently       Allergies   Allergen Reactions    Other Allergic Rhinitis     Seasonal  Grass       Medications Prior to Admission   Medication    albuterol (PROVENTIL HFA,VENTOLIN HFA) 90 mcg/act inhaler    levothyroxine 75 mcg tablet    OneTouch Delica Lancets 74Y MISC    ONETOUCH VERIO test strip    Prenatal MV-Min-FA-Omega-3 (PRENATAL GUMMIES/DHA & FA PO)    promethazine (PHENERGAN) 12 5 MG tablet    acetaminophen (TYLENOL) 500 mg tablet    albuterol (2 5 mg/3 mL) 0 083 % nebulizer solution    doxylamine (UNISON) 25 MG tablet           OBJECTIVE:  Vitals:  Temp:  [97 7 °F (36 5 °C)-98 °F (36 7 °C)] 98 °F (36 7 °C)  HR:  [101-122] 101  Resp:  [20] 20  BP: (112-179)/() 128/72  Body mass index is 52 46 kg/m²  Physical Exam:  Physical Exam   Constitutional: She is oriented to person, place, and time  She appears well-developed and well-nourished  Cardiovascular: Normal rate  Pulmonary/Chest: Effort normal  No respiratory distress  Musculoskeletal: She exhibits no edema  Neurological: She is alert and oriented to person, place, and time  Skin: Skin is warm and dry  Psychiatric: She has a normal mood and affect   Her behavior is normal           FHT:  Baseline Rate: 135 bpm  Variability: Moderate 6-25 bpm  Accelerations: 15 x 15 or greater  Decelerations: Variable  FHR Category: Category II    TOCO:   Contraction Frequency (minutes): 0    LABS:  Recent Results (from the past 12 hour(s))   Protein / creatinine ratio, urine    Collection Time: 03/22/20  1:09 PM   Result Value Ref Range    Creatinine, Ur 90 0 mg/dL    Protein Urine Random 78 mg/dL    Prot/Creat Ratio, Ur 0 87 (H) 0 00 - 0 10   CBC and Platelet    Collection Time: 03/22/20  1:09 PM   Result Value Ref Range    WBC 11 84 (H) 4 31 - 10 16 Thousand/uL    RBC 4 04 3 81 - 5 12 Million/uL    Hemoglobin 10 1 (L) 11 5 - 15 4 g/dL    Hematocrit 33 0 (L) 34 8 - 46 1 %    MCV 82 82 - 98 fL    MCH 25 0 (L) 26 8 - 34 3 pg    MCHC 30 6 (L) 31 4 - 37 4 g/dL    RDW 15 4 (H) 11 6 - 15 1 %    Platelets 301 445 - 440 Thousands/uL    MPV 10 7 8 9 - 12 7 fL   Comprehensive metabolic panel    Collection Time: 03/22/20  1:09 PM   Result Value Ref Range    Sodium 138 136 - 145 mmol/L    Potassium 3 7 3 5 - 5 3 mmol/L    Chloride 104 100 - 108 mmol/L    CO2 20 (L) 21 - 32 mmol/L    ANION GAP 14 (H) 4 - 13 mmol/L    BUN 11 5 - 25 mg/dL    Creatinine 0 77 0 60 - 1 30 mg/dL    Glucose 98 65 - 140 mg/dL    Glucose, Fasting 98 65 - 99 mg/dL    Calcium 8 8 8 3 - 10 1 mg/dL    AST 11 5 - 45 U/L    ALT 14 12 - 78 U/L    Alkaline Phosphatase 165 (H) 46 - 116 U/L    Total Protein 7 5 6 4 - 8 2 g/dL    Albumin 2 0 (L) 3 5 - 5 0 g/dL    Total Bilirubin 0 23 0 20 - 1 00 mg/dL    eGFR 97 ml/min/1 73sq m   Urinalysis with microscopic    Collection Time: 03/22/20  1:09 PM   Result Value Ref Range    Clarity, UA Clear     Color, UA Yellow     Specific Concepcion, UA 1 020 1 003 - 1 030    pH, UA 6 5 4 5, 5 0, 5 5, 6 0, 6 5, 7 0, 7 5, 8 0    Glucose, UA Negative Negative mg/dl    Ketones, UA Negative Negative mg/dl    Blood, UA Negative Negative    Protein, UA 30 (1+) (A) Negative mg/dl    Nitrite, UA Negative Negative    Bilirubin, UA Negative Negative    Urobilinogen, UA 0 2 0 2, 1 0 E U /dl E U /dl    Leukocytes, UA Negative Negative    WBC, UA None Seen None Seen, 0-5, 5-55, 5-65 /hpf    RBC, UA None Seen None Seen, 0-5 /hpf    Bacteria, UA None Seen None Seen, Occasional /hpf    Epithelial Cells Occasional None Seen, Occasional /hpf         >2 Midnights  INPATIENT       Omid Esparza MD  OB/GYN PGY-2  3/22/2020  3:02 PM

## 2020-03-22 NOTE — LETTER
[address] AND DELIVERY  [address] [place], [place] [id number]  Dept: [phone]    March 23, 2020     Patient: Nurys Barron   YOB: 1980   Date of Visit: 3/6/2020       To Whom it May Concern:    Nurys Barron is under my professional care  She was seen in the hospital from 3/22/2020   to 03/23/20  It is my professional opinion that [facility]ia should stop working as of now for the foreseeable future until cleared  If you have any questions or concerns, please don't hesitate to call           Sincerely,          Gabino Flores MD

## 2020-03-22 NOTE — LETTER
[address] AND DELIVERY  [address] [facility] [address]  Dept: [phone]    March 23, 2020     Patient: Tyrell Bledsoe   YOB: 1980   Date of Visit: 3/22/20       To Whom it May Concern:    Tyrell Bledsoe is under my professional care  She was seen in the hospital from 3/22/20 to 03/23/20  It is my professional opinion that Jorge Body should stop working as of now for the foreseeable future until cleared  If you have any questions or concerns, please don't hesitate to call           Sincerely,          Zamzam Youssef MD

## 2020-03-23 ENCOUNTER — TELEPHONE (OUTPATIENT)
Dept: FAMILY MEDICINE CLINIC | Facility: CLINIC | Age: 40
End: 2020-03-23

## 2020-03-23 ENCOUNTER — TRANSITIONAL CARE MANAGEMENT (OUTPATIENT)
Dept: FAMILY MEDICINE CLINIC | Facility: CLINIC | Age: 40
End: 2020-03-23

## 2020-03-23 VITALS
TEMPERATURE: 98.6 F | DIASTOLIC BLOOD PRESSURE: 81 MMHG | RESPIRATION RATE: 18 BRPM | BODY MASS INDEX: 52.46 KG/M2 | OXYGEN SATURATION: 97 % | HEART RATE: 108 BPM | SYSTOLIC BLOOD PRESSURE: 137 MMHG | HEIGHT: 62 IN

## 2020-03-23 LAB
ALBUMIN SERPL BCP-MCNC: 2 G/DL (ref 3.5–5)
ALP SERPL-CCNC: 170 U/L (ref 46–116)
ALT SERPL W P-5'-P-CCNC: 16 U/L (ref 12–78)
ANION GAP SERPL CALCULATED.3IONS-SCNC: 15 MMOL/L (ref 4–13)
AST SERPL W P-5'-P-CCNC: 14 U/L (ref 5–45)
BASOPHILS # BLD AUTO: 0.01 THOUSANDS/ΜL (ref 0–0.1)
BASOPHILS NFR BLD AUTO: 0 % (ref 0–1)
BILIRUB SERPL-MCNC: 0.34 MG/DL (ref 0.2–1)
BUN SERPL-MCNC: 10 MG/DL (ref 5–25)
CALCIUM SERPL-MCNC: 9.2 MG/DL (ref 8.3–10.1)
CHLORIDE SERPL-SCNC: 99 MMOL/L (ref 100–108)
CO2 SERPL-SCNC: 18 MMOL/L (ref 21–32)
CREAT SERPL-MCNC: 0.7 MG/DL (ref 0.6–1.3)
EOSINOPHIL # BLD AUTO: 0 THOUSAND/ΜL (ref 0–0.61)
EOSINOPHIL NFR BLD AUTO: 0 % (ref 0–6)
ERYTHROCYTE [DISTWIDTH] IN BLOOD BY AUTOMATED COUNT: 15.4 % (ref 11.6–15.1)
GFR SERPL CREATININE-BSD FRML MDRD: 109 ML/MIN/1.73SQ M
GLUCOSE P FAST SERPL-MCNC: 128 MG/DL (ref 65–99)
GLUCOSE SERPL-MCNC: 114 MG/DL (ref 65–140)
GLUCOSE SERPL-MCNC: 118 MG/DL (ref 65–140)
GLUCOSE SERPL-MCNC: 128 MG/DL (ref 65–140)
HCT VFR BLD AUTO: 36.6 % (ref 34.8–46.1)
HGB BLD-MCNC: 11.1 G/DL (ref 11.5–15.4)
IMM GRANULOCYTES # BLD AUTO: 0.08 THOUSAND/UL (ref 0–0.2)
IMM GRANULOCYTES NFR BLD AUTO: 1 % (ref 0–2)
LYMPHOCYTES # BLD AUTO: 1.26 THOUSANDS/ΜL (ref 0.6–4.47)
LYMPHOCYTES NFR BLD AUTO: 12 % (ref 14–44)
MCH RBC QN AUTO: 25.1 PG (ref 26.8–34.3)
MCHC RBC AUTO-ENTMCNC: 30.3 G/DL (ref 31.4–37.4)
MCV RBC AUTO: 83 FL (ref 82–98)
MONOCYTES # BLD AUTO: 0.3 THOUSAND/ΜL (ref 0.17–1.22)
MONOCYTES NFR BLD AUTO: 3 % (ref 4–12)
NEUTROPHILS # BLD AUTO: 9.28 THOUSANDS/ΜL (ref 1.85–7.62)
NEUTS SEG NFR BLD AUTO: 84 % (ref 43–75)
NRBC BLD AUTO-RTO: 0 /100 WBCS
PLATELET # BLD AUTO: 323 THOUSANDS/UL (ref 149–390)
PMV BLD AUTO: 10.9 FL (ref 8.9–12.7)
POTASSIUM SERPL-SCNC: 4.2 MMOL/L (ref 3.5–5.3)
PROT SERPL-MCNC: 7.5 G/DL (ref 6.4–8.2)
RBC # BLD AUTO: 4.43 MILLION/UL (ref 3.81–5.12)
SODIUM SERPL-SCNC: 132 MMOL/L (ref 136–145)
WBC # BLD AUTO: 10.93 THOUSAND/UL (ref 4.31–10.16)

## 2020-03-23 PROCEDURE — 82948 REAGENT STRIP/BLOOD GLUCOSE: CPT

## 2020-03-23 PROCEDURE — 99217 PR OBSERVATION CARE DISCHARGE MANAGEMENT: CPT | Performed by: OBSTETRICS & GYNECOLOGY

## 2020-03-23 PROCEDURE — 85025 COMPLETE CBC W/AUTO DIFF WBC: CPT | Performed by: STUDENT IN AN ORGANIZED HEALTH CARE EDUCATION/TRAINING PROGRAM

## 2020-03-23 PROCEDURE — 80053 COMPREHEN METABOLIC PANEL: CPT | Performed by: STUDENT IN AN ORGANIZED HEALTH CARE EDUCATION/TRAINING PROGRAM

## 2020-03-23 RX ORDER — BETAMETHASONE SODIUM PHOSPHATE AND BETAMETHASONE ACETATE 3; 3 MG/ML; MG/ML
12 INJECTION, SUSPENSION INTRA-ARTICULAR; INTRALESIONAL; INTRAMUSCULAR; SOFT TISSUE ONCE
Status: COMPLETED | OUTPATIENT
Start: 2020-03-23 | End: 2020-03-23

## 2020-03-23 RX ORDER — PROMETHAZINE HYDROCHLORIDE 12.5 MG/1
12.5 TABLET ORAL EVERY 6 HOURS PRN
Qty: 30 TABLET | Refills: 2 | Status: SHIPPED | OUTPATIENT
Start: 2020-03-23 | End: 2020-11-13 | Stop reason: ALTCHOICE

## 2020-03-23 RX ADMIN — LEVOTHYROXINE SODIUM 75 MCG: 75 TABLET ORAL at 05:16

## 2020-03-23 RX ADMIN — BETAMETHASONE SODIUM PHOSPHATE AND BETAMETHASONE ACETATE 12 MG: 3; 3 INJECTION, SUSPENSION INTRA-ARTICULAR; INTRALESIONAL; INTRAMUSCULAR at 12:36

## 2020-03-23 NOTE — PLAN OF CARE
Problem: ANTEPARTUM  Goal: Maintain pregnancy as long as maternal and/or fetal condition is stable  Description  INTERVENTIONS:  - Maternal surveillance  - Fetal surveillance  - Monitor uterine activity  - Medications as ordered  - Bedrest  Outcome: Progressing     Problem: PAIN - ADULT  Goal: Verbalizes/displays adequate comfort level or baseline comfort level  Description  Interventions:  - Encourage patient to monitor pain and request assistance  - Assess pain using appropriate pain scale  - Administer analgesics based on type and severity of pain and evaluate response  - Implement non-pharmacological measures as appropriate and evaluate response  - Consider cultural and social influences on pain and pain management  - Notify physician/advanced practitioner if interventions unsuccessful or patient reports new pain  Outcome: Progressing     Problem: INFECTION - ADULT  Goal: Absence or prevention of progression during hospitalization  Description  INTERVENTIONS:  - Assess and monitor for signs and symptoms of infection  - Monitor lab/diagnostic results  - Monitor all insertion sites, i e  indwelling lines, tubes, and drains  - [place] appropriate cooling/warming therapies per order  - Administer medications as ordered  - Instruct and encourage patient and family to use good hand hygiene technique  - Identify and instruct in appropriate isolation precautions for identified infection/condition   Outcome: Progressing  Goal: Absence of fever/infection during neutropenic period  Description  INTERVENTIONS:  - Monitor WBC    Outcome: Progressing     Problem: SAFETY ADULT  Goal: Patient will remain free of falls  Description  INTERVENTIONS:  - Assess patient frequently for physical needs  -  Identify cognitive and physical deficits and behaviors that affect risk of falls    -  [place] fall precautions as indicated by assessment   - Educate patient/family on patient safety including physical limitations  - Instruct patient to call for assistance with activity based on assessment  - Modify environment to reduce risk of injury  - Consider OT/PT consult to assist with strengthening/mobility  Outcome: Progressing  Goal: Maintain or return to baseline ADL function  Description  INTERVENTIONS:  -  Assess patient's ability to carry out ADLs; assess patient's baseline for ADL function and identify physical deficits which impact ability to perform ADLs (bathing, care of mouth/teeth, toileting, grooming, dressing, etc )  - Assess/evaluate cause of self-care deficits   - Assess range of motion  - Assess patient's mobility; develop plan if impaired  - Assess patient's need for assistive devices and provide as appropriate  - Encourage maximum independence but intervene and supervise when necessary  - Involve family in performance of ADLs  - Assess for home care needs following discharge   - Consider OT consult to assist with ADL evaluation and planning for discharge  - Provide patient education as appropriate  Outcome: Progressing  Goal: Maintain or return mobility status to optimal level  Description  INTERVENTIONS:  - Assess patient's baseline mobility status (ambulation, transfers, stairs, etc )    - Identify cognitive and physical deficits and behaviors that affect mobility  - Identify mobility aids required to assist with transfers and/or ambulation (gait belt, sit-to-stand, lift, walker, cane, etc )  - Force fall precautions as indicated by assessment  - Record patient progress and toleration of activity level on Mobility SBAR; progress patient to next Phase/Stage  - Instruct patient to call for assistance with activity based on assessment  - Consider rehabilitation consult to assist with strengthening/weightbearing, etc   Outcome: Progressing     Problem: Knowledge Deficit  Goal: Patient/family/caregiver demonstrates understanding of disease process, treatment plan, medications, and discharge instructions  Description  Complete learning assessment and assess knowledge base    Interventions:  - Provide teaching at level of understanding  - Provide teaching via preferred learning methods  Outcome: Progressing     Problem: DISCHARGE PLANNING  Goal: Discharge to home or other facility with appropriate resources  Description  INTERVENTIONS:  - Identify barriers to discharge w/patient and caregiver  - Arrange for needed discharge resources and transportation as appropriate  - Identify discharge learning needs (meds, wound care, etc )  - Arrange for interpretive services to assist at discharge as needed  - Refer to Case Management Department for coordinating discharge planning if the patient needs post-hospital services based on physician/advanced practitioner order or complex needs related to functional status, cognitive ability, or social support system  Outcome: Progressing

## 2020-03-23 NOTE — PROGRESS NOTES
Progress Note - OB/GYN  Patsy Ayala 36 y o  female MRN: [id number]  Unit/Bed#: -01 Encounter: [id number]    Assessment:  36 y o  [id number] at 35w4d admitted with newly diagnosed pre-eclampsia w/o severe features  Plan:  #1  Pre-eclampsia w/o severe features:  - Blood pressures: 120s-140s/70s-80s  - Urine P/C 0 87; all other labs WNL    #2  A1GDM:  - Blood sugar checks: fasting + 2hr PP  - BGs: 110s-140s (expected after betamethasone)    #3  Back pain:  - Improved w/ tylenol and Aqua K pad    #4  Asthma:  - Albuterol PRN    #5  Hypothyroidism:  - Levothyroxine 75mcg qd    #6  IUP at 35w4d:  - Reactive NST, no contractions on toco    FEN: diabetic diet  DVT ppx: SCDs  Dispo: home today after 2nd dose of BTM; will need close f/u with MFM and her OB      Subjective/Objective     Subjective:     Feels better today  Reports her back pain has improved  Contractions: no  Loss of fluid: no  Vaginal bleeding: no  Fetal movement: yes    Pain: improving  Tolerating PO: yes  Voiding: yes  Ambulating: yes  Headaches: no  Visual changes: no  Chest pain: no  Shortness of breath: no  Nausea: no  Vomiting/Diarrhea: no  Dysuria: no  Leg pain: no    Objective:     Vitals:   Temp:  [97 7 °F (36 5 °C)-98 4 °F (36 9 °C)] 97 8 °F (36 6 °C)  HR:  [100-122] 100  Resp:  [18-20] 18  BP: (112-179)/() 140/85     Physical Exam:     Physical Exam   Constitutional: She is oriented to person, place, and time  She appears well-developed and well-nourished  No distress  Cardiovascular: Normal rate, regular rhythm and normal heart sounds  Pulmonary/Chest: Effort normal and breath sounds normal  No respiratory distress  Neurological: She is alert and oriented to person, place, and time  Skin: Skin is warm and dry  She is not diaphoretic  Psychiatric: She has a normal mood and affect   Her behavior is normal        Fetal Assessment:  FHT: 125 / Moderate 6 - 25 bpm / accels, no decels, reactive  [place]: none    Lab, Imaging and other studies: I have personally reviewed pertinent reports        Lab Results   Component Value Date    WBC 10 93 (H) 03/23/2020    HGB 11 1 (L) 03/23/2020    HCT 36 6 03/23/2020    MCV 83 03/23/2020     03/23/2020       Lab Results   Component Value Date    CALCIUM 9 2 03/23/2020    K 4 2 03/23/2020    CO2 18 (L) 03/23/2020    CL 99 (L) 03/23/2020    BUN 10 03/23/2020    CREATININE 0 70 03/23/2020       Lab Results   Component Value Date    ALT 16 03/23/2020    AST 14 03/23/2020    ALKPHOS 170 (H) 03/23/2020       Neeta Bernal MD  OBGYN, PGY-2  3/23/2020  8:09 AM

## 2020-03-23 NOTE — UTILIZATION REVIEW
Initial Clinical Review    Admission: Date/Time/Statement: Admission Orders (From admission, onward)     Ordered        03/22/20 1501  Place in Observation  Once                   Orders Placed This Encounter   Procedures    Place in Observation     Standing Status:   Standing     Number of Occurrences:   1     Order Specific Question:   Admitting Physician     Answer:   Daisy Davis [[id number]]     Order Specific Question:   Level of Care     Answer:   Med Surg [[id number]]     Chief Complaint   Patient presents with    Pain     back and vaginal     Assessment/Plan:   37 yo  G 5 P 3 @ 35 3/7 wks gestation seen in [facility] triage as observation status for increased blood pressures    Pregnancy also complicated by: AMA, L6KUR, asthma, hypothyroidism, BMI 52,    FHT:  Baseline Rate: 135 bpm  Variability: Moderate 6-25 bpm  Accelerations: 15 x 15 or greater  Decelerations: Variable  FHR Category: Category II     TOCO:   Contraction Frequency (minutes): 0    ED Triage Vitals   Temperature Pulse Respirations Blood Pressure SpO2   03/22/20 1230 03/22/20 1230 03/22/20 1230 03/22/20 1230 03/22/20 1915   98 °F (36 7 °C) (!) 122 20 (!) 179/93 98 %      Temp Source Heart Rate Source Patient Position - Orthostatic VS BP Location FiO2 (%)   03/22/20 1230 03/22/20 1230 03/22/20 1230 03/22/20 1230 --   Temporal Monitor Sitting Left arm       Pain Score       03/22/20 1230       8        Wt Readings from Last 1 Encounters:   03/20/20 130 kg (286 lb 12 8 oz)     Additional Vital Signs:   Date/Time  Temp  Pulse  Resp  BP  SpO2  Cardiac (WDL)  Patient Position - Orthostatic VS   03/23/20 0523  97 8 °F (36 6 °C)  100  18  140/85  98 %  --  Lying   03/22/20 2313  98 2 °F (36 8 °C)  109Abnormal   18  125/79  97 %  --  Lying   03/22/20 1937  --  100  --  141/84   --  --  --   BP: retaken at 03/22/20 1937 03/22/20 1935  98 4 °F (36 9 °C)  109Abnormal   20  140/86  98 %  --  Lying   03/22/20 1915  98 4 °F (36 9 °C)  109Abnormal   20  140/86  98 % WDL  Lying   03/22/20 1545  98 2 °F (36 8 °C)  104  18  139/85  --  --  --   03/22/20 1440  --  --  --  114/64  --  --  --   03/22/20 1420  --  100  --  118/64  --  --  --   03/22/20 1410  --  --  --  128/72  --  --  --   03/22/20 1350  --  101  --  130/71  --  --  --   03/22/20 1340  --  --  --  112/65  --  --  --   03/22/20 1320  --  106Abnormal   --  127/75  --  --  --   03/22/20 1310  --  --  --  122/73  --  --  --   03/22/20 1250  --  111Abnormal   --  143/94             Pertinent Labs/Diagnostic Test Results:   Results from last 7 days   Lab Units 03/23/20  0516 03/22/20  1309   WBC Thousand/uL 10 93* 11 84*   HEMOGLOBIN g/dL 11 1* 10 1*   HEMATOCRIT % 36 6 33 0*   PLATELETS Thousands/uL 323 306   NEUTROS ABS Thousands/µL 9 28*  --          Results from last 7 days   Lab Units 03/23/20  0516 03/22/20  1309   SODIUM mmol/L 132* 138   POTASSIUM mmol/L 4 2 3 7   CHLORIDE mmol/L 99* 104   CO2 mmol/L 18* 20*   ANION GAP mmol/L 15* 14*   BUN mg/dL 10 11   CREATININE mg/dL 0 70 0 77   EGFR ml/min/1 73sq m 109 97   CALCIUM mg/dL 9 2 8 8     Results from last 7 days   Lab Units 03/23/20  0516 03/22/20  1309   AST U/L 14 11   ALT U/L 16 14   ALK PHOS U/L 170* 165*   TOTAL PROTEIN g/dL 7 5 7 5   ALBUMIN g/dL 2 0* 2 0*   TOTAL BILIRUBIN mg/dL 0 34 0 23     Results from last 7 days   Lab Units 03/23/20  0630 03/22/20  1902   POC GLUCOSE mg/dl 114 142*     Results from last 7 days   Lab Units 03/23/20  0516 03/22/20  1309   GLUCOSE RANDOM mg/dL 128 98     Results from last 7 days   Lab Units 03/22/20  1309 03/20/20  1157   CLARITY UA  Clear  --    COLOR UA  Yellow  --    SPEC GRAV UA  1 020  --    PH UA  6 5  --    GLUCOSE UA mg/dl Negative neg   KETONES UA mg/dl Negative  --    BLOOD UA  Negative  --    PROTEIN UA mg/dl 30 (1+)* neg   NITRITE UA  Negative  --    BILIRUBIN UA  Negative  --    UROBILINOGEN UA E U /dl 0 2  --    LEUKOCYTES UA  Negative  --    WBC UA /hpf None Seen  --    RBC UA /hpf None Seen  --    BACTERIA UA /hpf None Seen  --    EPITHELIAL CELLS WET PREP /hpf Occasional  --    CREATININE UR mg/dL 90 0  --    PROTEIN UR mg/dL 78  --    PROT/CREAT RATIO UR  0 87*  --      Past Medical History:   Diagnosis Date    Abnormal Pap smear of cervix     Anemia     Disease of thyroid gland     Fibroid     Was told during last pregnancy had fibroid  Asymtomtic    Hypothyroidism     Kidney stone     Years ago  Had during last pregnancy    Varicella     Patient did at child     Present on Admission:   Gestational diabetes mellitus (GDM) in third trimester      Admitting Diagnosis: 35 weeks gestation of pregnancy [Z3A 35]  Age/Sex: 36 y o  female  Admission Orders:  Scheduled Medications:    Medications:  betamethasone acetate-betamethasone sodium phosphate 12 mg Intramuscular Q24H   levothyroxine 75 mcg Oral Early Morning     Continuous IV Infusions:     PRN Meds:    acetaminophen 975 mg Oral Q6H PRN   albuterol 2 puff Inhalation Q4H PRN   calcium carbonate 500 mg Oral Daily PRN       NST q shift    Network Utilization Review Department  [email or web address]  ATTENTION: Please call with any questions or concerns to [phone] and carefully listen to the prompts so that you are directed to the right person  All voicemails are confidential   Pieter Sena all requests for admission clinical reviews, approved or denied determinations and any other requests to dedicated fax number below belonging to the campus where the patient is receiving treatment   List of dedicated fax numbers for the Facilities:  FACILITY NAME UR FAX NUMBER   ADMISSION DENIALS (Administrative/Medical Necessity) [phone]   [address] [place] [address] (Maternity/NICU/Pediatrics) [phone]   Megha Walls [phone]   [facility] [phone]   Crystal Bellaire [phone]   [facility] Demarcus [address] [place] [address] [phone] Sandy Dan [phone]   [address] [facility]  [phone]   [address] [place] [address] [facility] [phone]

## 2020-03-23 NOTE — DISCHARGE INSTRUCTIONS
Preeclampsia   WHAT YOU NEED TO KNOW:   Preeclampsia is a condition that can develop during week 20 or later of your pregnancy  Preeclampsia means you have high blood pressure and may have protein in your urine  Preeclampsia can cause mild to life-threatening health problems for you and your unborn baby  DISCHARGE INSTRUCTIONS:   Call 911 for any of the following:   · You have a seizure  · You have severe abdominal pain with nausea and vomiting  Seek care immediately if:   · You develop a severe headache that does not go away  · You have blurred or spotted vision that does not go away  · You are bleeding from your vagina  · You have new or increased swelling in your face or hands  · You are urinating little or not at all  Contact your obstetrician if:   · You are urinating less than usual      · You do not feel your baby's movement as often as usual      · You have questions or concerns about your condition or care  Medicines:   · Blood pressure medicine  helps lower your blood pressure and protects your heart, lungs, brain, and kidneys  Take your blood pressure medicine exactly as directed  · Low doses of aspirin  may be recommended after 12 weeks of pregnancy if you are at high risk for preeclampsia  Aspirin may help prevent preeclampsia or problems that can happen from preeclampsia  Do not take aspirin unless directed by your healthcare provider  · Take your medicine as directed  Contact your healthcare provider if you think your medicine is not helping or if you have side effects  Tell him or her if you are allergic to any medicine  Keep a list of the medicines, vitamins, and herbs you take  Include the amounts, and when and why you take them  Bring the list or the pill bottles to follow-up visits  Carry your medicine list with you in case of an emergency  Follow up with your obstetrician as directed: You will need tests 1 to 2 times a week to check your condition   Tests include blood pressure checks, urine and blood tests, and fetal monitoring  Write down your questions so you remember to ask them during your visits  Steps to take if you have a seizure: You may have seizures if you develop eclampsia  You may feel confused, tense, or aggressive when the seizure ends  Tell your family, friends, or coworkers to do the following if you have a seizure:  · Clear the area to help prevent injuries from falls    · Place you on your left side so you do not choke    · Give oxygen if it is available    · Call 911     · Stay with you until medical help arrives  Blood pressure checks: You may need to check your blood pressure each day  Your obstetrician will teach you how to check your blood pressure at home  Measure your blood pressure on the same arm and in the same position each time  Write down the date and time you take your blood pressure, and bring your notes to your prenatal visits  Kick counts: You may need to keep track of how often your baby moves or kicks over a certain amount of time  Ask your obstetrician how to do kick counts and how often to do them  Daily weight:  Weigh yourself every day before breakfast  Weight gain can be a sign of extra fluid in your body  Call your obstetrician if you have gained 2 or more pounds in a week  Rest:  Your obstetrician may tell you to rest more often if you have mild symptoms of preeclampsia  You may need total bedrest for more severe symptoms  Try to lie on your left side  © 2017 [address] [employer] Information is for End User's use only and may not be sold, redistributed or otherwise used for commercial purposes  All illustrations and images included in CareNotes® are the copyrighted property of [employer] , [employer]  or Ed Chiang  The above information is an  only  It is not intended as medical advice for individual conditions or treatments   Talk to your doctor, nurse or pharmacist before following any medical regimen to see if it is safe and effective for you

## 2020-03-24 ENCOUNTER — TELEPHONE (OUTPATIENT)
Dept: PERINATAL CARE | Facility: CLINIC | Age: 40
End: 2020-03-24

## 2020-03-24 NOTE — TELEPHONE ENCOUNTER
Patient notified via My Chart as well for need for recommendations of  surveillance beginning at 42 weeks with MFM d/t increased BMI and AMA  Has upcoming prenatal visit 3/26

## 2020-03-24 NOTE — TELEPHONE ENCOUNTER
[facility] Telephone Note:    Spoke with pt and confirmed appointment with [facility]  Pt advised of new visitor policy allowing NO support persons for appointment  PT also screened for COVID19      Cough: no  Fever: no  Shortness of breath:no  Known exposures to COVID-19, or international travel: no

## 2020-03-24 NOTE — TELEPHONE ENCOUNTER
L/M for patient regarding need for twice weekly testing at [facility]  This has not been scheduled  Has upcoming prenatal visit 3/26

## 2020-03-25 ENCOUNTER — ROUTINE PRENATAL (OUTPATIENT)
Dept: PERINATAL CARE | Facility: CLINIC | Age: 40
End: 2020-03-25
Payer: COMMERCIAL

## 2020-03-25 ENCOUNTER — ULTRASOUND (OUTPATIENT)
Dept: PERINATAL CARE | Facility: CLINIC | Age: 40
End: 2020-03-25
Payer: COMMERCIAL

## 2020-03-25 VITALS
DIASTOLIC BLOOD PRESSURE: 88 MMHG | SYSTOLIC BLOOD PRESSURE: 128 MMHG | WEIGHT: 284 LBS | BODY MASS INDEX: 52.26 KG/M2 | HEIGHT: 62 IN | HEART RATE: 92 BPM

## 2020-03-25 DIAGNOSIS — O09.523 MULTIGRAVIDA OF ADVANCED MATERNAL AGE IN THIRD TRIMESTER: ICD-10-CM

## 2020-03-25 DIAGNOSIS — O14.93 PRE-ECLAMPSIA IN THIRD TRIMESTER: Primary | ICD-10-CM

## 2020-03-25 DIAGNOSIS — O24.410 DIET CONTROLLED GESTATIONAL DIABETES MELLITUS (GDM) IN THIRD TRIMESTER: ICD-10-CM

## 2020-03-25 DIAGNOSIS — O36.5930 INTRAUTERINE GROWTH RESTRICTION (IUGR) AFFECTING CARE OF MOTHER, THIRD TRIMESTER, SINGLE OR UNSPECIFIED FETUS: ICD-10-CM

## 2020-03-25 DIAGNOSIS — E03.1 CONGENITAL HYPOTHYROIDISM WITHOUT GOITER: ICD-10-CM

## 2020-03-25 DIAGNOSIS — Z3A.35 35 WEEKS GESTATION OF PREGNANCY: ICD-10-CM

## 2020-03-25 DIAGNOSIS — Z98.891 PREVIOUS CESAREAN SECTION: ICD-10-CM

## 2020-03-25 DIAGNOSIS — O99.213 OBESITY AFFECTING PREGNANCY, ANTEPARTUM, THIRD TRIMESTER: ICD-10-CM

## 2020-03-25 PROCEDURE — 76821 MIDDLE CEREBRAL ARTERY ECHO: CPT | Performed by: OBSTETRICS & GYNECOLOGY

## 2020-03-25 PROCEDURE — 59025 FETAL NON-STRESS TEST: CPT | Performed by: OBSTETRICS & GYNECOLOGY

## 2020-03-25 PROCEDURE — 76820 UMBILICAL ARTERY ECHO: CPT | Performed by: OBSTETRICS & GYNECOLOGY

## 2020-03-25 PROCEDURE — NC001 PR NO CHARGE

## 2020-03-25 PROCEDURE — 76816 OB US FOLLOW-UP PER FETUS: CPT | Performed by: OBSTETRICS & GYNECOLOGY

## 2020-03-25 NOTE — PATIENT INSTRUCTIONS
Preeclampsia   AMBULATORY CARE:   Preeclampsia and eclampsia  are conditions that can develop during week 20 or later of your pregnancy  Preeclampsia means you have high blood pressure and protein in your urine  Preeclampsia progresses to eclampsia if you have a seizure  These conditions can create mild to life-threatening health problems for you and your unborn baby  Common signs and symptoms include the following:   · Swollen face and hands    · Weight gain of 2 or more pounds each week     · Headache    · Spotted or blurred vision     · Pain in the upper abdomen  Call 911 for any of the following:   · Seizure    · Severe abdominal pain with nausea and vomiting  Seek care immediately for the following symptoms:   · You develop a severe headache that does not go away  · You have blurred or spotted vision that does not go away  · You are bleeding from your vagina  · You have new or increased swelling in your face or hands  · You are urinating little or not at all  Contact your healthcare provider if:   · You are urinating less than usual      · You do not feel your baby's movements as often as usual     · You have questions or concerns about your condition or care  Seizure first aid:  You may have seizures with eclampsia  You may feel confused, tense, or aggressive when the seizure ends  Tell your family, friends, or coworkers to do the following if you have a seizure:  · Clear the area to help prevent injuries from falls    · Place you on your left side so you do not choke    · Give oxygen if it is available    · Call 911     · Stay with you until medical help arrives  Treatment for preeclampsia and eclampsia  may include any of the following:  · Medicines  may be given to lower your blood pressure, protect your organs, or prevent seizures  Low doses of aspirin after 12 weeks of pregnancy may be recommended if you are at high risk for preeclampsia   Aspirin may help prevent preeclampsia or problems that can happen from preeclampsia  Do not take aspirin unless directed by your healthcare provider  · Rest  as directed  Your healthcare provider may tell you to rest more often if you have mild symptoms of preeclampsia  Lie on your left side as often as you can  You may need complete bedrest if you have more severe symptoms  You may need to be in the hospital if your condition worsens  · Delivery  usually stops preeclampsia and eclampsia  Healthcare providers may deliver your baby right away if he is full-term (37 weeks or more)  You may need to deliver your baby early if you or the baby has life-threatening symptoms  Blood pressure checks: You may need to check your blood pressure each day  Your obstetrician will teach you how to check your blood pressure at home  Measure your blood pressure on the same arm and in the same position each time  Write down the date and time you take your blood pressure, and bring your notes to your prenatal visits  Kick counts: You may need to keep track of how often your baby moves or kicks over a certain amount of time  Ask your obstetrician how to do kick counts and how often to do them  Daily weight:  Weigh yourself every day before breakfast  Weight gain can be a sign of extra fluid in your body  Call your obstetrician if you have gained 2 or more pounds in a week  Rest:  Your obstetrician may tell you to rest more often if you have mild symptoms of preeclampsia  You may need total bedrest for more severe symptoms  Try to lie on your left side  Follow up with your obstetrician as directed: You will need tests 1 to 2 times a week to check your condition  Tests include blood pressure checks, urine and blood tests, and fetal monitoring  Write down your questions so you remember to ask them during your visits  © 2017 [address] [employer] Information is for End User's use only and may not be sold, redistributed or otherwise used for commercial purposes   All illustrations and images included in CareNotes® are the copyrighted property of A D A M [employer]  or Ed Chiang  The above information is an  only  It is not intended as medical advice for individual conditions or treatments  Talk to your doctor, nurse or pharmacist before following any medical regimen to see if it is safe and effective for you

## 2020-03-25 NOTE — LETTER
03/25/20  [place]  1980    Thank you for completing Part 1 of your Sequential Screen  To obtain a complete test result, please complete blood work for Part 2 Sequential Screen between the weeks of ______ to ______  Based on your insurance coverage, please use one of the following locations  Call our office for any questions at [phone]      [address] [place] [address] Avenue  1492 [facility], [facility], [address]   300 [facility], [place], [address]  Phone:  [phone]     Phone:  [phone]    [facility] 82  [facility] 6 Nena Mustafa, 960 [facility], [address] [place] [address] Road  Phone: [phone]      Phone: [phone] Pam Lipscomb for lab)    53 [facility]  [address], [facility], [address] [place] [address]   700 [facility], [place], [facility] [address]  Phone: [phone]      Phone:  [phone]    [address]  1401 [facility] 6   [facility], [address] [place] [address]  Phone: [phone]      Phone:  [address] [facility] [facility]  207 [facility], [facility], [address]   36 [facility], [facility], [facility] 89  Phone: [phone]      Phone: [phone]    [facility] [facility])  1430 [facility], [place], [address]  [address], [place], [address]  Phone:  [phone]     Phone: [phone]    [address] [place] [address], Melony Muhammad 34  Phone: [phone]    Sincerely,    Severiano Kirkland RN

## 2020-03-25 NOTE — LETTER
NST sleeve cover sheet    Patient name: Jayesh Corona  : 1980  MRN: [id number]    KONRAD: Estimated Date of Delivery: 20    Obstetrician: _______________________________    Reason(s) for testing:  __________________________________________      Testing frequency:    ___ 2x/wk  ___ 1x/wk  ___ Dopplers  ___ BPP?       Last growth scan: __________________________________________

## 2020-03-25 NOTE — LETTER
2020     Lorena Montalvo [address] [facility]  [address]   [address] [place] [address]    Patient: Felix Cunha   YOB: 1980   Date of Visit: 3/25/2020       Dear Dr Cynthia López: Thank you for referring Felix Cunha to me for evaluation  Below are my notes for this consultation  If you have questions, please do not hesitate to call me  I look forward to following your patient along with you  Sincerely,        Mena Masters MD        CC: No Recipients  Mena Masters MD  3/25/2020  1:30 PM  Sign at close encounter  [facility] is here today for an ultrasound for an ultrasound for fetal growth for fetal growth    Problem list:   x2  Advanced maternal age  GDM A2  Marginal PCI  BMI greater than 50   Preeclampsia based based on elevated on elevated blood pressures blood pressures and protein creatinine and protein creatinine ratio 0 87 ratio 0 8  She received steroids for lung maturity  Ultrasound findings: The ultrasound today shows a fetus that is symmetric and now in the 9 percentile  Umbilical and MCA Dopplers and MADDISON appear normal   NST is reactive and reassuring  Pregnancy ultrasound has limitations and is unable to detect all forms of fetal congenital abnormalities  The inaccuracy in the EFW can be off by 1 lb either way in the third trimester  No further follow-up ultrasounds are recommended at this time  Her fetus is small at less then the 10% but the fetal testing and dopplers are normal   She has a planned  for 8 days  Recommend twice weekly NST and weekly MADDISON till then  I find no reason today to recommend an earlier delivery      Mena Masters MD

## 2020-03-25 NOTE — PROGRESS NOTES
Gerald Kenyon is here today Gerald Kenyon is here today for an ultrasound for an ultrasound for fetal growth for fetal growth    Problem list:   x2  Advanced maternal age  GDM A2  Marginal PCI  BMI greater than 50   Preeclampsia based based on elevated on elevated blood pressures blood pressures and protein creatinine and protein creatinine ratio 0 87 ratio 0 8  She received steroids for lung maturity  Ultrasound findings: The ultrasound today shows a fetus that is symmetric and now in the 9 percentile  Umbilical and MCA Dopplers and MADDISON appear normal   NST is reactive and reassuring  Pregnancy ultrasound has limitations and is unable to detect all forms of fetal congenital abnormalities  The inaccuracy in the EFW can be off by 1 lb either way in the third trimester  No further follow-up ultrasounds are recommended at this time  Her fetus is small at less then the 10% but the fetal testing and dopplers are normal   She has a planned  for 8 days  Recommend twice weekly NST and weekly MADDISON till then  I find no reason today to recommend an earlier delivery      Zack Owusu MD

## 2020-03-26 ENCOUNTER — ROUTINE PRENATAL (OUTPATIENT)
Dept: OBGYN CLINIC | Facility: CLINIC | Age: 40
End: 2020-03-26

## 2020-03-26 VITALS — BODY MASS INDEX: 52.82 KG/M2 | WEIGHT: 288.8 LBS | SYSTOLIC BLOOD PRESSURE: 150 MMHG | DIASTOLIC BLOOD PRESSURE: 82 MMHG

## 2020-03-26 DIAGNOSIS — Z34.83 PRENATAL CARE, SUBSEQUENT PREGNANCY, THIRD TRIMESTER: Primary | ICD-10-CM

## 2020-03-26 DIAGNOSIS — O14.93 PRE-ECLAMPSIA IN THIRD TRIMESTER: ICD-10-CM

## 2020-03-26 LAB
SL AMB  POCT GLUCOSE, UA: ABNORMAL
SL AMB POCT URINE PROTEIN: 2

## 2020-03-26 PROCEDURE — 87653 STREP B DNA AMP PROBE: CPT | Performed by: OBSTETRICS & GYNECOLOGY

## 2020-03-26 PROCEDURE — PNV: Performed by: OBSTETRICS & GYNECOLOGY

## 2020-03-26 NOTE — PROGRESS NOTES
Tae Resendez is overall doing well - reports today is the best she has felt in awhile  Saw [address]  [facility] yesterday, did advise APFS - she will call today to schedule this  Will repeat PreE labs on Monday  RLTCS and BS scheduled for 4/2 @ 14:30 with me  Preeclampsia precautions reviewed

## 2020-03-26 NOTE — PROGRESS NOTES
GBS today, last few weeks given, had flu/tdap vaccines, AB pos  She states her blood sugars have been good  Having good fetal movement  +2 protein in urine today

## 2020-03-27 ENCOUNTER — TELEPHONE (OUTPATIENT)
Dept: PERINATAL CARE | Facility: CLINIC | Age: 40
End: 2020-03-27

## 2020-03-27 NOTE — TELEPHONE ENCOUNTER
[facility] Telephone Note:    -------------------------------------------------------------    Attempted to reach patient by phone and voicemail to confirm appointment for her ultrasound  At this time we are seeing regularly scheduled appointments  Patient was asked that if they were not feeling well, have cough, fever or Shortness of Breath to call and reschedule their appointment once all symptoms have resolved  If you have any of these symptoms please contact your primary care physician or [phone]  Pt was notified that there are NO visitors or support people to be present for their appointment and notified of the phone check in process

## 2020-03-28 LAB — GP B STREP DNA SPEC QL NAA+PROBE: ABNORMAL

## 2020-03-30 ENCOUNTER — ULTRASOUND (OUTPATIENT)
Dept: PERINATAL CARE | Facility: CLINIC | Age: 40
End: 2020-03-30
Payer: COMMERCIAL

## 2020-03-30 ENCOUNTER — APPOINTMENT (OUTPATIENT)
Dept: LAB | Facility: HOSPITAL | Age: 40
End: 2020-03-30
Payer: COMMERCIAL

## 2020-03-30 VITALS
DIASTOLIC BLOOD PRESSURE: 78 MMHG | BODY MASS INDEX: 52.82 KG/M2 | HEIGHT: 62 IN | HEART RATE: 112 BPM | SYSTOLIC BLOOD PRESSURE: 140 MMHG

## 2020-03-30 DIAGNOSIS — O14.93 PRE-ECLAMPSIA IN THIRD TRIMESTER: ICD-10-CM

## 2020-03-30 DIAGNOSIS — Z3A.36 36 WEEKS GESTATION OF PREGNANCY: ICD-10-CM

## 2020-03-30 DIAGNOSIS — O36.5930 INTRAUTERINE GROWTH RESTRICTION (IUGR) AFFECTING CARE OF MOTHER, THIRD TRIMESTER, SINGLE OR UNSPECIFIED FETUS: Primary | ICD-10-CM

## 2020-03-30 LAB
ALBUMIN SERPL BCP-MCNC: 3.5 G/DL (ref 3–5.2)
ALP SERPL-CCNC: 182 U/L (ref 43–122)
ALT SERPL W P-5'-P-CCNC: 15 U/L (ref 9–52)
ANION GAP SERPL CALCULATED.3IONS-SCNC: 8 MMOL/L (ref 5–14)
AST SERPL W P-5'-P-CCNC: 19 U/L (ref 14–36)
BILIRUB SERPL-MCNC: 0.3 MG/DL
BUN SERPL-MCNC: 12 MG/DL (ref 5–25)
CALCIUM SERPL-MCNC: 8.8 MG/DL (ref 8.4–10.2)
CHLORIDE SERPL-SCNC: 104 MMOL/L (ref 97–108)
CO2 SERPL-SCNC: 22 MMOL/L (ref 22–30)
CREAT SERPL-MCNC: 0.59 MG/DL (ref 0.6–1.2)
ERYTHROCYTE [DISTWIDTH] IN BLOOD BY AUTOMATED COUNT: 16.9 %
GFR SERPL CREATININE-BSD FRML MDRD: 115 ML/MIN/1.73SQ M
GLUCOSE P FAST SERPL-MCNC: 95 MG/DL (ref 70–99)
HCT VFR BLD AUTO: 33 % (ref 36–46)
HGB BLD-MCNC: 10.5 G/DL (ref 12–16)
MCH RBC QN AUTO: 24.4 PG (ref 26–34)
MCHC RBC AUTO-ENTMCNC: 31.7 G/DL (ref 31–36)
MCV RBC AUTO: 77 FL (ref 80–100)
PLATELET # BLD AUTO: 291 THOUSANDS/UL (ref 150–450)
PMV BLD AUTO: 8.7 FL (ref 8.9–12.7)
POTASSIUM SERPL-SCNC: 4.4 MMOL/L (ref 3.6–5)
PROT SERPL-MCNC: 7.3 G/DL (ref 5.9–8.4)
RBC # BLD AUTO: 4.29 MILLION/UL (ref 4–5.2)
SODIUM SERPL-SCNC: 134 MMOL/L (ref 137–147)
WBC # BLD AUTO: 11.5 THOUSAND/UL (ref 4.5–11)

## 2020-03-30 PROCEDURE — 76820 UMBILICAL ARTERY ECHO: CPT | Performed by: OBSTETRICS & GYNECOLOGY

## 2020-03-30 PROCEDURE — 76821 MIDDLE CEREBRAL ARTERY ECHO: CPT | Performed by: OBSTETRICS & GYNECOLOGY

## 2020-03-30 PROCEDURE — 76815 OB US LIMITED FETUS(S): CPT | Performed by: OBSTETRICS & GYNECOLOGY

## 2020-03-30 PROCEDURE — 59025 FETAL NON-STRESS TEST: CPT | Performed by: OBSTETRICS & GYNECOLOGY

## 2020-03-30 PROCEDURE — 36415 COLL VENOUS BLD VENIPUNCTURE: CPT

## 2020-03-30 PROCEDURE — 85027 COMPLETE CBC AUTOMATED: CPT

## 2020-03-30 PROCEDURE — 80053 COMPREHEN METABOLIC PANEL: CPT

## 2020-03-31 ENCOUNTER — TELEPHONE (OUTPATIENT)
Dept: OBGYN CLINIC | Facility: CLINIC | Age: 40
End: 2020-03-31

## 2020-03-31 NOTE — TELEPHONE ENCOUNTER
Attempted to call patient yesterday and today  LM asking her to call office  I reviewed her labs from Monday - these are without concerns  I wanted to update her that I would not be performing her  on Thursday due to changes in hospital staffing re: Hiro  It will either be Dr Jayant Becker or Dr Sarah Martines and she will be in excellent hands  I also wanted to update her on current visitation status and requirement for patient masking

## 2020-04-02 ENCOUNTER — HOSPITAL ENCOUNTER (INPATIENT)
Facility: HOSPITAL | Age: 40
LOS: 2 days | Discharge: HOME | End: 2020-04-04
Attending: OBSTETRICS & GYNECOLOGY | Admitting: OBSTETRICS & GYNECOLOGY
Payer: COMMERCIAL

## 2020-04-02 ENCOUNTER — ANESTHESIA (INPATIENT)
Dept: LABOR AND DELIVERY | Facility: HOSPITAL | Age: 40
End: 2020-04-02
Payer: COMMERCIAL

## 2020-04-02 ENCOUNTER — ANESTHESIA EVENT (INPATIENT)
Dept: LABOR AND DELIVERY | Facility: HOSPITAL | Age: 40
End: 2020-04-02
Payer: COMMERCIAL

## 2020-04-02 DIAGNOSIS — O14.93 PRE-ECLAMPSIA IN THIRD TRIMESTER: ICD-10-CM

## 2020-04-02 DIAGNOSIS — Z98.891 STATUS POST REPEAT LOW TRANSVERSE CESAREAN SECTION: ICD-10-CM

## 2020-04-02 DIAGNOSIS — O34.219 PREVIOUS CESAREAN SECTION COMPLICATING PREGNANCY: ICD-10-CM

## 2020-04-02 DIAGNOSIS — Z3A.37 37 WEEKS GESTATION OF PREGNANCY: Primary | ICD-10-CM

## 2020-04-02 PROBLEM — O24.410 DIET CONTROLLED GESTATIONAL DIABETES MELLITUS (GDM) IN THIRD TRIMESTER: Status: ACTIVE | Noted: 2020-01-24

## 2020-04-02 PROBLEM — Z30.2 STATUS POST TUBAL LIGATION AT TIME OF DELIVERY, CURRENT HOSPITALIZATION: Status: ACTIVE | Noted: 2020-04-02

## 2020-04-02 LAB
ABO GROUP BLD: NORMAL
ALBUMIN SERPL BCP-MCNC: 2 G/DL (ref 3.5–5)
ALP SERPL-CCNC: 158 U/L (ref 46–116)
ALT SERPL W P-5'-P-CCNC: 11 U/L (ref 12–78)
ANION GAP SERPL CALCULATED.3IONS-SCNC: 9 MMOL/L (ref 4–13)
AST SERPL W P-5'-P-CCNC: 9 U/L (ref 5–45)
BASE EXCESS BLDCOA CALC-SCNC: -2.7 MMOL/L (ref 3–11)
BASE EXCESS BLDCOV CALC-SCNC: -1.4 MMOL/L (ref 1–9)
BILIRUB SERPL-MCNC: 0.2 MG/DL (ref 0.2–1)
BLD GP AB SCN SERPL QL: NEGATIVE
BUN SERPL-MCNC: 8 MG/DL (ref 5–25)
CALCIUM SERPL-MCNC: 8.9 MG/DL (ref 8.3–10.1)
CHLORIDE SERPL-SCNC: 105 MMOL/L (ref 100–108)
CO2 SERPL-SCNC: 20 MMOL/L (ref 21–32)
CREAT SERPL-MCNC: 0.7 MG/DL (ref 0.6–1.3)
ERYTHROCYTE [DISTWIDTH] IN BLOOD BY AUTOMATED COUNT: 15.9 % (ref 11.6–15.1)
GFR SERPL CREATININE-BSD FRML MDRD: 109 ML/MIN/1.73SQ M
GLUCOSE SERPL-MCNC: 77 MG/DL (ref 65–140)
GLUCOSE SERPL-MCNC: 91 MG/DL (ref 65–140)
HCO3 BLDCOA-SCNC: 24.1 MMOL/L (ref 17.3–27.3)
HCO3 BLDCOV-SCNC: 23.7 MMOL/L (ref 12.2–28.6)
HCT VFR BLD AUTO: 33.9 % (ref 34.8–46.1)
HGB BLD-MCNC: 10.1 G/DL (ref 11.5–15.4)
MCH RBC QN AUTO: 24 PG (ref 26.8–34.3)
MCHC RBC AUTO-ENTMCNC: 29.8 G/DL (ref 31.4–37.4)
MCV RBC AUTO: 81 FL (ref 82–98)
O2 CT VFR BLDCOA CALC: 5.7 ML/DL
OXYHGB MFR BLDCOA: 28.6 %
OXYHGB MFR BLDCOV: 65.8 %
PCO2 BLDCOA: 49.7 MM[HG] (ref 30–60)
PCO2 BLDCOV: 41.1 MM HG (ref 27–43)
PH BLDCOA: 7.3 [PH] (ref 7.23–7.43)
PH BLDCOV: 7.38 [PH] (ref 7.19–7.49)
PLATELET # BLD AUTO: 314 THOUSANDS/UL (ref 149–390)
PMV BLD AUTO: 10.4 FL (ref 8.9–12.7)
PO2 BLDCOA: 15.9 MM HG (ref 5–25)
PO2 BLDCOV: 27.1 MM HG (ref 15–45)
POTASSIUM SERPL-SCNC: 3.8 MMOL/L (ref 3.5–5.3)
PROT SERPL-MCNC: 7.2 G/DL (ref 6.4–8.2)
RBC # BLD AUTO: 4.21 MILLION/UL (ref 3.81–5.12)
RH BLD: POSITIVE
SAO2 % BLDCOV: 14 ML/DL
SODIUM SERPL-SCNC: 134 MMOL/L (ref 136–145)
SPECIMEN EXPIRATION DATE: NORMAL
WBC # BLD AUTO: 11.78 THOUSAND/UL (ref 4.31–10.16)

## 2020-04-02 PROCEDURE — 82948 REAGENT STRIP/BLOOD GLUCOSE: CPT

## 2020-04-02 PROCEDURE — 58611 LIGATE OVIDUCT(S) ADD-ON: CPT | Performed by: OBSTETRICS & GYNECOLOGY

## 2020-04-02 PROCEDURE — 86900 BLOOD TYPING SEROLOGIC ABO: CPT | Performed by: STUDENT IN AN ORGANIZED HEALTH CARE EDUCATION/TRAINING PROGRAM

## 2020-04-02 PROCEDURE — 88302 TISSUE EXAM BY PATHOLOGIST: CPT | Performed by: PATHOLOGY

## 2020-04-02 PROCEDURE — 86850 RBC ANTIBODY SCREEN: CPT | Performed by: STUDENT IN AN ORGANIZED HEALTH CARE EDUCATION/TRAINING PROGRAM

## 2020-04-02 PROCEDURE — 4A1HXCZ MONITORING OF PRODUCTS OF CONCEPTION, CARDIAC RATE, EXTERNAL APPROACH: ICD-10-PCS | Performed by: OBSTETRICS & GYNECOLOGY

## 2020-04-02 PROCEDURE — 99024 POSTOP FOLLOW-UP VISIT: CPT | Performed by: OBSTETRICS & GYNECOLOGY

## 2020-04-02 PROCEDURE — 86901 BLOOD TYPING SEROLOGIC RH(D): CPT | Performed by: STUDENT IN AN ORGANIZED HEALTH CARE EDUCATION/TRAINING PROGRAM

## 2020-04-02 PROCEDURE — 80053 COMPREHEN METABOLIC PANEL: CPT | Performed by: STUDENT IN AN ORGANIZED HEALTH CARE EDUCATION/TRAINING PROGRAM

## 2020-04-02 PROCEDURE — 59510 CESAREAN DELIVERY: CPT | Performed by: OBSTETRICS & GYNECOLOGY

## 2020-04-02 PROCEDURE — 85027 COMPLETE CBC AUTOMATED: CPT | Performed by: STUDENT IN AN ORGANIZED HEALTH CARE EDUCATION/TRAINING PROGRAM

## 2020-04-02 PROCEDURE — 0UB70ZZ EXCISION OF BILATERAL FALLOPIAN TUBES, OPEN APPROACH: ICD-10-PCS | Performed by: OBSTETRICS & GYNECOLOGY

## 2020-04-02 PROCEDURE — 86592 SYPHILIS TEST NON-TREP QUAL: CPT | Performed by: STUDENT IN AN ORGANIZED HEALTH CARE EDUCATION/TRAINING PROGRAM

## 2020-04-02 PROCEDURE — 82805 BLOOD GASES W/O2 SATURATION: CPT | Performed by: OBSTETRICS & GYNECOLOGY

## 2020-04-02 RX ORDER — ONDANSETRON 2 MG/ML
4 INJECTION INTRAMUSCULAR; INTRAVENOUS EVERY 4 HOURS PRN
Status: DISPENSED | OUTPATIENT
Start: 2020-04-02 | End: 2020-04-03

## 2020-04-02 RX ORDER — KETOROLAC TROMETHAMINE 30 MG/ML
INJECTION, SOLUTION INTRAMUSCULAR; INTRAVENOUS AS NEEDED
Status: DISCONTINUED | OUTPATIENT
Start: 2020-04-02 | End: 2020-04-02 | Stop reason: SURG

## 2020-04-02 RX ORDER — HYDROXYZINE HYDROCHLORIDE 25 MG/1
25 TABLET, FILM COATED ORAL EVERY 6 HOURS PRN
Status: DISCONTINUED | OUTPATIENT
Start: 2020-04-02 | End: 2020-04-04 | Stop reason: HOSPADM

## 2020-04-02 RX ORDER — FENTANYL CITRATE/PF 50 MCG/ML
50 SYRINGE (ML) INJECTION
Status: DISCONTINUED | OUTPATIENT
Start: 2020-04-02 | End: 2020-04-04 | Stop reason: HOSPADM

## 2020-04-02 RX ORDER — ONDANSETRON 2 MG/ML
INJECTION INTRAMUSCULAR; INTRAVENOUS AS NEEDED
Status: DISCONTINUED | OUTPATIENT
Start: 2020-04-02 | End: 2020-04-02 | Stop reason: SURG

## 2020-04-02 RX ORDER — HYDROMORPHONE HCL/PF 1 MG/ML
0.5 SYRINGE (ML) INJECTION
Status: DISCONTINUED | OUTPATIENT
Start: 2020-04-02 | End: 2020-04-04 | Stop reason: HOSPADM

## 2020-04-02 RX ORDER — CALCIUM GLUCONATE 94 MG/ML
1 INJECTION, SOLUTION INTRAVENOUS ONCE AS NEEDED
Status: DISCONTINUED | OUTPATIENT
Start: 2020-04-02 | End: 2020-04-04 | Stop reason: HOSPADM

## 2020-04-02 RX ORDER — LABETALOL 20 MG/4 ML (5 MG/ML) INTRAVENOUS SYRINGE
20 ONCE
Status: COMPLETED | OUTPATIENT
Start: 2020-04-02 | End: 2020-04-02

## 2020-04-02 RX ORDER — NALOXONE HYDROCHLORIDE 0.4 MG/ML
0.1 INJECTION, SOLUTION INTRAMUSCULAR; INTRAVENOUS; SUBCUTANEOUS
Status: ACTIVE | OUTPATIENT
Start: 2020-04-02 | End: 2020-04-03

## 2020-04-02 RX ORDER — CEFAZOLIN SODIUM 1 G/50ML
1000 SOLUTION INTRAVENOUS ONCE
Status: COMPLETED | OUTPATIENT
Start: 2020-04-02 | End: 2020-04-02

## 2020-04-02 RX ORDER — EPHEDRINE SULFATE 50 MG/ML
INJECTION INTRAVENOUS AS NEEDED
Status: DISCONTINUED | OUTPATIENT
Start: 2020-04-02 | End: 2020-04-02 | Stop reason: SURG

## 2020-04-02 RX ORDER — ONDANSETRON 2 MG/ML
4 INJECTION INTRAMUSCULAR; INTRAVENOUS ONCE AS NEEDED
Status: COMPLETED | OUTPATIENT
Start: 2020-04-02 | End: 2020-04-02

## 2020-04-02 RX ORDER — OXYTOCIN/RINGER'S LACTATE 30/500 ML
62.5 PLASTIC BAG, INJECTION (ML) INTRAVENOUS ONCE
Status: COMPLETED | OUTPATIENT
Start: 2020-04-02 | End: 2020-04-03

## 2020-04-02 RX ORDER — DEXAMETHASONE SODIUM PHOSPHATE 4 MG/ML
INJECTION, SOLUTION INTRA-ARTICULAR; INTRALESIONAL; INTRAMUSCULAR; INTRAVENOUS; SOFT TISSUE AS NEEDED
Status: DISCONTINUED | OUTPATIENT
Start: 2020-04-02 | End: 2020-04-02 | Stop reason: SURG

## 2020-04-02 RX ORDER — MAGNESIUM SULFATE HEPTAHYDRATE 40 MG/ML
2 INJECTION, SOLUTION INTRAVENOUS ONCE
Status: COMPLETED | OUTPATIENT
Start: 2020-04-02 | End: 2020-04-02

## 2020-04-02 RX ORDER — MEPERIDINE HYDROCHLORIDE 25 MG/ML
12.5 INJECTION INTRAMUSCULAR; INTRAVENOUS; SUBCUTANEOUS ONCE AS NEEDED
Status: DISCONTINUED | OUTPATIENT
Start: 2020-04-02 | End: 2020-04-04 | Stop reason: HOSPADM

## 2020-04-02 RX ORDER — ALBUTEROL SULFATE 2.5 MG/3ML
2.5 SOLUTION RESPIRATORY (INHALATION) ONCE AS NEEDED
Status: DISCONTINUED | OUTPATIENT
Start: 2020-04-02 | End: 2020-04-04 | Stop reason: HOSPADM

## 2020-04-02 RX ORDER — MORPHINE SULFATE 0.5 MG/ML
INJECTION, SOLUTION EPIDURAL; INTRATHECAL; INTRAVENOUS AS NEEDED
Status: DISCONTINUED | OUTPATIENT
Start: 2020-04-02 | End: 2020-04-02 | Stop reason: SURG

## 2020-04-02 RX ORDER — KETOROLAC TROMETHAMINE 30 MG/ML
30 INJECTION, SOLUTION INTRAMUSCULAR; INTRAVENOUS EVERY 6 HOURS
Status: COMPLETED | OUTPATIENT
Start: 2020-04-03 | End: 2020-04-03

## 2020-04-02 RX ORDER — ALBUTEROL SULFATE 90 UG/1
2 AEROSOL, METERED RESPIRATORY (INHALATION) EVERY 4 HOURS PRN
Status: DISCONTINUED | OUTPATIENT
Start: 2020-04-02 | End: 2020-04-04 | Stop reason: HOSPADM

## 2020-04-02 RX ORDER — CEFAZOLIN SODIUM 2 G/50ML
2000 SOLUTION INTRAVENOUS ONCE
Status: COMPLETED | OUTPATIENT
Start: 2020-04-02 | End: 2020-04-02

## 2020-04-02 RX ORDER — MAGNESIUM SULFATE HEPTAHYDRATE 40 MG/ML
2 INJECTION, SOLUTION INTRAVENOUS CONTINUOUS
Status: DISCONTINUED | OUTPATIENT
Start: 2020-04-02 | End: 2020-04-03

## 2020-04-02 RX ORDER — MAGNESIUM SULFATE HEPTAHYDRATE 40 MG/ML
4 INJECTION, SOLUTION INTRAVENOUS ONCE
Status: COMPLETED | OUTPATIENT
Start: 2020-04-02 | End: 2020-04-02

## 2020-04-02 RX ORDER — SODIUM CHLORIDE, SODIUM LACTATE, POTASSIUM CHLORIDE, CALCIUM CHLORIDE 600; 310; 30; 20 MG/100ML; MG/100ML; MG/100ML; MG/100ML
125 INJECTION, SOLUTION INTRAVENOUS CONTINUOUS
Status: DISCONTINUED | OUTPATIENT
Start: 2020-04-02 | End: 2020-04-04 | Stop reason: HOSPADM

## 2020-04-02 RX ORDER — IBUPROFEN 600 MG/1
600 TABLET ORAL EVERY 6 HOURS PRN
Status: DISCONTINUED | OUTPATIENT
Start: 2020-04-03 | End: 2020-04-04 | Stop reason: HOSPADM

## 2020-04-02 RX ORDER — ACETAMINOPHEN 325 MG/1
650 TABLET ORAL EVERY 4 HOURS PRN
Status: DISCONTINUED | OUTPATIENT
Start: 2020-04-03 | End: 2020-04-04 | Stop reason: HOSPADM

## 2020-04-02 RX ORDER — OXYCODONE HYDROCHLORIDE AND ACETAMINOPHEN 5; 325 MG/1; MG/1
2 TABLET ORAL EVERY 4 HOURS PRN
Status: DISCONTINUED | OUTPATIENT
Start: 2020-04-03 | End: 2020-04-04 | Stop reason: HOSPADM

## 2020-04-02 RX ORDER — OXYCODONE HYDROCHLORIDE AND ACETAMINOPHEN 5; 325 MG/1; MG/1
1 TABLET ORAL EVERY 4 HOURS PRN
Status: DISCONTINUED | OUTPATIENT
Start: 2020-04-03 | End: 2020-04-04 | Stop reason: HOSPADM

## 2020-04-02 RX ORDER — LEVOTHYROXINE SODIUM 0.07 MG/1
75 TABLET ORAL DAILY
Status: DISCONTINUED | OUTPATIENT
Start: 2020-04-02 | End: 2020-04-04 | Stop reason: HOSPADM

## 2020-04-02 RX ORDER — DIPHENHYDRAMINE HYDROCHLORIDE 50 MG/ML
25 INJECTION INTRAMUSCULAR; INTRAVENOUS AS NEEDED
Status: DISCONTINUED | OUTPATIENT
Start: 2020-04-02 | End: 2020-04-04 | Stop reason: HOSPADM

## 2020-04-02 RX ORDER — DOCUSATE SODIUM 100 MG/1
100 CAPSULE, LIQUID FILLED ORAL 2 TIMES DAILY
Status: DISCONTINUED | OUTPATIENT
Start: 2020-04-02 | End: 2020-04-04 | Stop reason: HOSPADM

## 2020-04-02 RX ORDER — BUPIVACAINE HYDROCHLORIDE 7.5 MG/ML
INJECTION, SOLUTION INTRASPINAL AS NEEDED
Status: DISCONTINUED | OUTPATIENT
Start: 2020-04-02 | End: 2020-04-02 | Stop reason: SURG

## 2020-04-02 RX ORDER — METOCLOPRAMIDE HYDROCHLORIDE 5 MG/ML
5 INJECTION INTRAMUSCULAR; INTRAVENOUS EVERY 6 HOURS PRN
Status: ACTIVE | OUTPATIENT
Start: 2020-04-02 | End: 2020-04-03

## 2020-04-02 RX ORDER — OXYTOCIN/RINGER'S LACTATE 30/500 ML
PLASTIC BAG, INJECTION (ML) INTRAVENOUS CONTINUOUS PRN
Status: DISCONTINUED | OUTPATIENT
Start: 2020-04-02 | End: 2020-04-02 | Stop reason: SURG

## 2020-04-02 RX ORDER — CALCIUM CARBONATE 200(500)MG
1000 TABLET,CHEWABLE ORAL DAILY PRN
Status: DISCONTINUED | OUTPATIENT
Start: 2020-04-02 | End: 2020-04-04 | Stop reason: HOSPADM

## 2020-04-02 RX ORDER — DEXAMETHASONE SODIUM PHOSPHATE 4 MG/ML
8 INJECTION, SOLUTION INTRA-ARTICULAR; INTRALESIONAL; INTRAMUSCULAR; INTRAVENOUS; SOFT TISSUE ONCE AS NEEDED
Status: ACTIVE | OUTPATIENT
Start: 2020-04-02 | End: 2020-04-03

## 2020-04-02 RX ADMIN — DEXAMETHASONE SODIUM PHOSPHATE 4 MG: 4 INJECTION, SOLUTION INTRAMUSCULAR; INTRAVENOUS at 15:04

## 2020-04-02 RX ADMIN — PHENYLEPHRINE HYDROCHLORIDE 100 MCG: 10 INJECTION INTRAVENOUS at 15:00

## 2020-04-02 RX ADMIN — SODIUM CHLORIDE, SODIUM LACTATE, POTASSIUM CHLORIDE, AND CALCIUM CHLORIDE: .6; .31; .03; .02 INJECTION, SOLUTION INTRAVENOUS at 13:54

## 2020-04-02 RX ADMIN — LABETALOL 20 MG/4 ML (5 MG/ML) INTRAVENOUS SYRINGE 20 MG: at 18:38

## 2020-04-02 RX ADMIN — SODIUM CHLORIDE, SODIUM LACTATE, POTASSIUM CHLORIDE, AND CALCIUM CHLORIDE: .6; .31; .03; .02 INJECTION, SOLUTION INTRAVENOUS at 14:49

## 2020-04-02 RX ADMIN — Medication 62.5 MILLI-UNITS/MIN: at 17:27

## 2020-04-02 RX ADMIN — MAGNESIUM SULFATE HEPTAHYDRATE 2 G: 40 INJECTION, SOLUTION INTRAVENOUS at 19:06

## 2020-04-02 RX ADMIN — EPHEDRINE SULFATE 5 MG: 50 INJECTION, SOLUTION INTRAVENOUS at 15:36

## 2020-04-02 RX ADMIN — KETOROLAC TROMETHAMINE 30 MG: 30 INJECTION, SOLUTION INTRAMUSCULAR at 23:38

## 2020-04-02 RX ADMIN — SODIUM CHLORIDE, SODIUM LACTATE, POTASSIUM CHLORIDE, AND CALCIUM CHLORIDE: .6; .31; .03; .02 INJECTION, SOLUTION INTRAVENOUS at 15:58

## 2020-04-02 RX ADMIN — ONDANSETRON 4 MG: 2 INJECTION INTRAMUSCULAR; INTRAVENOUS at 19:28

## 2020-04-02 RX ADMIN — KETOROLAC TROMETHAMINE 30 MG: 30 INJECTION, SOLUTION INTRAMUSCULAR at 16:20

## 2020-04-02 RX ADMIN — DIPHENHYDRAMINE HYDROCHLORIDE 25 MG: 50 INJECTION, SOLUTION INTRAMUSCULAR; INTRAVENOUS at 17:28

## 2020-04-02 RX ADMIN — PHENYLEPHRINE HYDROCHLORIDE 50 MCG/MIN: 10 INJECTION INTRAVENOUS at 14:59

## 2020-04-02 RX ADMIN — CEFAZOLIN SODIUM 2000 MG: 2 SOLUTION INTRAVENOUS at 14:55

## 2020-04-02 RX ADMIN — PHENYLEPHRINE HYDROCHLORIDE 100 MCG: 10 INJECTION INTRAVENOUS at 15:36

## 2020-04-02 RX ADMIN — FENTANYL CITRATE 50 MCG: 50 INJECTION, SOLUTION INTRAMUSCULAR; INTRAVENOUS at 17:34

## 2020-04-02 RX ADMIN — FENTANYL CITRATE 50 MCG: 50 INJECTION, SOLUTION INTRAMUSCULAR; INTRAVENOUS at 17:42

## 2020-04-02 RX ADMIN — MORPHINE SULFATE 0.3 MG: 0.5 INJECTION, SOLUTION EPIDURAL; INTRATHECAL; INTRAVENOUS at 14:58

## 2020-04-02 RX ADMIN — BUPIVACAINE HYDROCHLORIDE IN DEXTROSE 1.6 ML: 7.5 INJECTION, SOLUTION SUBARACHNOID at 14:58

## 2020-04-02 RX ADMIN — ONDANSETRON 4 MG: 2 INJECTION INTRAMUSCULAR; INTRAVENOUS at 15:42

## 2020-04-02 RX ADMIN — CEFAZOLIN SODIUM 1000 MG: 1 SOLUTION INTRAVENOUS at 15:00

## 2020-04-02 RX ADMIN — SODIUM CHLORIDE, SODIUM LACTATE, POTASSIUM CHLORIDE, AND CALCIUM CHLORIDE 125 ML/HR: .6; .31; .03; .02 INJECTION, SOLUTION INTRAVENOUS at 17:28

## 2020-04-02 RX ADMIN — HYDROXYZINE HYDROCHLORIDE 25 MG: 25 TABLET ORAL at 22:34

## 2020-04-02 RX ADMIN — MAGNESIUM SULFATE HEPTAHYDRATE 4 G: 40 INJECTION, SOLUTION INTRAVENOUS at 18:47

## 2020-04-02 RX ADMIN — MAGNESIUM SULFATE IN WATER 2 G/HR: 40 INJECTION, SOLUTION INTRAVENOUS at 19:22

## 2020-04-02 RX ADMIN — SODIUM CHLORIDE, SODIUM LACTATE, POTASSIUM CHLORIDE, AND CALCIUM CHLORIDE 1000 ML: .6; .31; .03; .02 INJECTION, SOLUTION INTRAVENOUS at 13:58

## 2020-04-02 RX ADMIN — SODIUM CHLORIDE, SODIUM LACTATE, POTASSIUM CHLORIDE, AND CALCIUM CHLORIDE 125 ML/HR: .6; .31; .03; .02 INJECTION, SOLUTION INTRAVENOUS at 13:59

## 2020-04-02 RX ADMIN — ONDANSETRON 4 MG: 2 INJECTION INTRAMUSCULAR; INTRAVENOUS at 15:04

## 2020-04-02 RX ADMIN — Medication 350 MILLI-UNITS/MIN: at 15:29

## 2020-04-03 LAB
ERYTHROCYTE [DISTWIDTH] IN BLOOD BY AUTOMATED COUNT: 15.5 % (ref 11.6–15.1)
ERYTHROCYTE [DISTWIDTH] IN BLOOD BY AUTOMATED COUNT: 15.9 % (ref 11.6–15.1)
HCT VFR BLD AUTO: 28.7 % (ref 34.8–46.1)
HCT VFR BLD AUTO: 31 % (ref 34.8–46.1)
HGB BLD-MCNC: 8.7 G/DL (ref 11.5–15.4)
HGB BLD-MCNC: 9.5 G/DL (ref 11.5–15.4)
MCH RBC QN AUTO: 24.9 PG (ref 26.8–34.3)
MCH RBC QN AUTO: 24.9 PG (ref 26.8–34.3)
MCHC RBC AUTO-ENTMCNC: 30.3 G/DL (ref 31.4–37.4)
MCHC RBC AUTO-ENTMCNC: 30.6 G/DL (ref 31.4–37.4)
MCV RBC AUTO: 81 FL (ref 82–98)
MCV RBC AUTO: 82 FL (ref 82–98)
PLATELET # BLD AUTO: 296 THOUSANDS/UL (ref 149–390)
PLATELET # BLD AUTO: 303 THOUSANDS/UL (ref 149–390)
PMV BLD AUTO: 10.7 FL (ref 8.9–12.7)
PMV BLD AUTO: 10.7 FL (ref 8.9–12.7)
RBC # BLD AUTO: 3.49 MILLION/UL (ref 3.81–5.12)
RBC # BLD AUTO: 3.81 MILLION/UL (ref 3.81–5.12)
RPR SER QL: NORMAL
WBC # BLD AUTO: 15.09 THOUSAND/UL (ref 4.31–10.16)
WBC # BLD AUTO: 16.19 THOUSAND/UL (ref 4.31–10.16)

## 2020-04-03 PROCEDURE — 85027 COMPLETE CBC AUTOMATED: CPT | Performed by: OBSTETRICS & GYNECOLOGY

## 2020-04-03 PROCEDURE — 99024 POSTOP FOLLOW-UP VISIT: CPT | Performed by: OBSTETRICS & GYNECOLOGY

## 2020-04-03 RX ORDER — DIPHENHYDRAMINE HYDROCHLORIDE 50 MG/ML
25 INJECTION INTRAMUSCULAR; INTRAVENOUS EVERY 6 HOURS PRN
Status: DISCONTINUED | OUTPATIENT
Start: 2020-04-03 | End: 2020-04-04 | Stop reason: HOSPADM

## 2020-04-03 RX ORDER — SIMETHICONE 80 MG
80 TABLET,CHEWABLE ORAL EVERY 6 HOURS PRN
Status: DISCONTINUED | OUTPATIENT
Start: 2020-04-03 | End: 2020-04-04 | Stop reason: HOSPADM

## 2020-04-03 RX ORDER — LORATADINE 10 MG/1
10 TABLET ORAL DAILY
Status: DISCONTINUED | OUTPATIENT
Start: 2020-04-03 | End: 2020-04-04 | Stop reason: HOSPADM

## 2020-04-03 RX ADMIN — ONDANSETRON 4 MG: 2 INJECTION INTRAMUSCULAR; INTRAVENOUS at 04:18

## 2020-04-03 RX ADMIN — IBUPROFEN 600 MG: 600 TABLET ORAL at 19:51

## 2020-04-03 RX ADMIN — KETOROLAC TROMETHAMINE 30 MG: 30 INJECTION, SOLUTION INTRAMUSCULAR at 12:35

## 2020-04-03 RX ADMIN — SIMETHICONE CHEW TAB 80 MG 80 MG: 80 TABLET ORAL at 21:20

## 2020-04-03 RX ADMIN — MAGNESIUM SULFATE IN WATER 2 G/HR: 40 INJECTION, SOLUTION INTRAVENOUS at 05:27

## 2020-04-03 RX ADMIN — HYDROXYZINE HYDROCHLORIDE 25 MG: 25 TABLET ORAL at 04:27

## 2020-04-03 RX ADMIN — ENOXAPARIN SODIUM 40 MG: 40 INJECTION SUBCUTANEOUS at 08:39

## 2020-04-03 RX ADMIN — KETOROLAC TROMETHAMINE 30 MG: 30 INJECTION, SOLUTION INTRAMUSCULAR at 18:35

## 2020-04-03 RX ADMIN — DIPHENHYDRAMINE HYDROCHLORIDE 25 MG: 50 INJECTION, SOLUTION INTRAMUSCULAR; INTRAVENOUS at 09:38

## 2020-04-03 RX ADMIN — DOCUSATE SODIUM 100 MG: 100 CAPSULE, LIQUID FILLED ORAL at 18:36

## 2020-04-03 RX ADMIN — LORATADINE 10 MG: 10 TABLET ORAL at 09:38

## 2020-04-03 RX ADMIN — LEVOTHYROXINE SODIUM 75 MCG: 75 TABLET ORAL at 06:17

## 2020-04-03 RX ADMIN — ENOXAPARIN SODIUM 40 MG: 40 INJECTION SUBCUTANEOUS at 21:06

## 2020-04-03 RX ADMIN — DOCUSATE SODIUM 100 MG: 100 CAPSULE, LIQUID FILLED ORAL at 08:39

## 2020-04-03 RX ADMIN — KETOROLAC TROMETHAMINE 30 MG: 30 INJECTION, SOLUTION INTRAMUSCULAR at 06:17

## 2020-04-03 RX ADMIN — ACETAMINOPHEN 650 MG: 325 TABLET, FILM COATED ORAL at 00:00

## 2020-04-04 VITALS
DIASTOLIC BLOOD PRESSURE: 74 MMHG | HEART RATE: 114 BPM | WEIGHT: 288 LBS | BODY MASS INDEX: 53 KG/M2 | RESPIRATION RATE: 20 BRPM | SYSTOLIC BLOOD PRESSURE: 122 MMHG | TEMPERATURE: 98.2 F | HEIGHT: 62 IN | OXYGEN SATURATION: 96 %

## 2020-04-04 PROCEDURE — 99024 POSTOP FOLLOW-UP VISIT: CPT | Performed by: OBSTETRICS & GYNECOLOGY

## 2020-04-04 RX ORDER — IBUPROFEN 600 MG/1
600 TABLET ORAL EVERY 6 HOURS PRN
Qty: 30 TABLET | Refills: 0 | Status: SHIPPED | OUTPATIENT
Start: 2020-04-04 | End: 2020-11-13 | Stop reason: ALTCHOICE

## 2020-04-04 RX ORDER — DOCUSATE SODIUM 100 MG/1
100 CAPSULE, LIQUID FILLED ORAL 2 TIMES DAILY
Qty: 10 CAPSULE | Refills: 0 | Status: SHIPPED | OUTPATIENT
Start: 2020-04-04 | End: 2020-11-13 | Stop reason: ALTCHOICE

## 2020-04-04 RX ORDER — OXYCODONE HYDROCHLORIDE AND ACETAMINOPHEN 5; 325 MG/1; MG/1
1 TABLET ORAL EVERY 4 HOURS PRN
Qty: 15 TABLET | Refills: 0 | Status: SHIPPED | OUTPATIENT
Start: 2020-04-04 | End: 2020-04-14

## 2020-04-04 RX ADMIN — LEVOTHYROXINE SODIUM 75 MCG: 75 TABLET ORAL at 05:02

## 2020-04-04 RX ADMIN — OXYCODONE HYDROCHLORIDE AND ACETAMINOPHEN 1 TABLET: 5; 325 TABLET ORAL at 10:55

## 2020-04-04 RX ADMIN — SIMETHICONE CHEW TAB 80 MG 80 MG: 80 TABLET ORAL at 08:57

## 2020-04-04 RX ADMIN — ENOXAPARIN SODIUM 40 MG: 40 INJECTION SUBCUTANEOUS at 08:39

## 2020-04-04 RX ADMIN — OXYCODONE HYDROCHLORIDE AND ACETAMINOPHEN 1 TABLET: 5; 325 TABLET ORAL at 05:03

## 2020-04-04 RX ADMIN — LORATADINE 10 MG: 10 TABLET ORAL at 08:39

## 2020-04-04 RX ADMIN — DOCUSATE SODIUM 100 MG: 100 CAPSULE, LIQUID FILLED ORAL at 08:39

## 2020-04-06 ENCOUNTER — TELEMEDICINE (OUTPATIENT)
Dept: FAMILY MEDICINE CLINIC | Facility: CLINIC | Age: 40
End: 2020-04-06
Payer: COMMERCIAL

## 2020-04-06 ENCOUNTER — TRANSITIONAL CARE MANAGEMENT (OUTPATIENT)
Dept: FAMILY MEDICINE CLINIC | Facility: CLINIC | Age: 40
End: 2020-04-06

## 2020-04-06 DIAGNOSIS — E03.9 HYPOTHYROIDISM, UNSPECIFIED TYPE: ICD-10-CM

## 2020-04-06 DIAGNOSIS — R03.0 ELEVATED BLOOD PRESSURE READING: Primary | ICD-10-CM

## 2020-04-06 DIAGNOSIS — O24.410 DIET CONTROLLED GESTATIONAL DIABETES MELLITUS (GDM) IN THIRD TRIMESTER: ICD-10-CM

## 2020-04-06 DIAGNOSIS — J45.20 MILD INTERMITTENT ASTHMA WITHOUT COMPLICATION: ICD-10-CM

## 2020-04-06 PROCEDURE — 99495 TRANSJ CARE MGMT MOD F2F 14D: CPT | Performed by: FAMILY MEDICINE

## 2020-04-07 VITALS — HEART RATE: 102 BPM | SYSTOLIC BLOOD PRESSURE: 150 MMHG | DIASTOLIC BLOOD PRESSURE: 92 MMHG

## 2020-04-09 LAB — PLACENTA IN STORAGE: NORMAL

## 2020-04-24 ENCOUNTER — TELEMEDICINE (OUTPATIENT)
Dept: OBGYN CLINIC | Facility: CLINIC | Age: 40
End: 2020-04-24

## 2020-04-24 DIAGNOSIS — E03.9 HYPOTHYROIDISM, UNSPECIFIED TYPE: ICD-10-CM

## 2020-04-24 DIAGNOSIS — O24.410 DIET CONTROLLED GESTATIONAL DIABETES MELLITUS (GDM) IN THIRD TRIMESTER: Primary | ICD-10-CM

## 2020-04-24 PROCEDURE — 99024 POSTOP FOLLOW-UP VISIT: CPT | Performed by: STUDENT IN AN ORGANIZED HEALTH CARE EDUCATION/TRAINING PROGRAM

## 2020-06-25 ENCOUNTER — TELEPHONE (OUTPATIENT)
Dept: OTHER | Facility: OTHER | Age: 40
End: 2020-06-25

## 2020-06-25 ENCOUNTER — TELEPHONE (OUTPATIENT)
Dept: LABOR AND DELIVERY | Facility: HOSPITAL | Age: 40
End: 2020-06-25

## 2020-09-04 ENCOUNTER — TELEPHONE (OUTPATIENT)
Dept: OBGYN CLINIC | Facility: CLINIC | Age: 40
End: 2020-09-04

## 2020-09-04 DIAGNOSIS — N93.9 ABNORMAL UTERINE BLEEDING (AUB): Primary | ICD-10-CM

## 2020-09-04 RX ORDER — MEDROXYPROGESTERONE ACETATE 10 MG/1
10 TABLET ORAL DAILY
Qty: 20 TABLET | Refills: 0 | Status: SHIPPED | OUTPATIENT
Start: 2020-09-04 | End: 2020-11-13 | Stop reason: ALTCHOICE

## 2020-09-04 NOTE — TELEPHONE ENCOUNTER
I spoke with Mikal Garvin - recommend course of provera - this was sent to her pharmacy  Can you please schedule her with me for appt to discuss longterm options for bleeding?  thanks

## 2020-09-04 NOTE — TELEPHONE ENCOUNTER
Pt deliv 4/2, had two mths of off & on bleeding & clots,  She is bleeding through a super plus tampon & In a lot of pain, (since 8/31)  pls see Notes from Dr Ernie Morales 6/25,  pls call pt as she is frustrated,  Thanks

## 2020-09-04 NOTE — TELEPHONE ENCOUNTER
Pt delivered by c section 4/2  She was to be delivered by ct, but due to covid, had ami nevarez    She felt very anxious as the provider did "not look at her or even talk to her"  Her first pp cycle was extremely heavy and she was aware this would happen  Had instructions from cs which she followed  2nd cycle also very heavy with superplus tampons and pads with many clots  She works at [employer] and it ois very difficult for her  lmp began8/31 and by the next day was soaking 4 super plus tampons/day plus night pads  She is very concerned abiout the many clots, some larger than 1/2 dollar  Before this call, she was going to find another dr as she felt her needs were not being adressed  She has  to work [employer] and requests further instructions  tt to cs

## 2020-09-04 NOTE — TELEPHONE ENCOUNTER
Pt contacted and scheduled for dr gill russell in [facility]  Tuesday 09/29/2020 9 am  Pt stated she will be bleeding again by then  I advised pt if the medication does not work to call the office and we can address with the provider  Pt stated she is tired of these calls  I offered pt the address to [facility] and refused to take it stating she will google it and find it herself  I thanked pt for her time and she hung up on me

## 2020-09-07 ENCOUNTER — APPOINTMENT (EMERGENCY)
Dept: CT IMAGING | Facility: HOSPITAL | Age: 40
End: 2020-09-07
Payer: COMMERCIAL

## 2020-09-07 ENCOUNTER — HOSPITAL ENCOUNTER (EMERGENCY)
Facility: HOSPITAL | Age: 40
Discharge: HOME | End: 2020-09-07
Attending: EMERGENCY MEDICINE | Admitting: EMERGENCY MEDICINE
Payer: COMMERCIAL

## 2020-09-07 VITALS
OXYGEN SATURATION: 99 % | TEMPERATURE: 98.1 F | DIASTOLIC BLOOD PRESSURE: 90 MMHG | RESPIRATION RATE: 20 BRPM | BODY MASS INDEX: 50.17 KG/M2 | HEART RATE: 99 BPM | SYSTOLIC BLOOD PRESSURE: 140 MMHG | WEIGHT: 274.31 LBS

## 2020-09-07 DIAGNOSIS — N93.8 DUB (DYSFUNCTIONAL UTERINE BLEEDING): Primary | ICD-10-CM

## 2020-09-07 DIAGNOSIS — R10.2 PELVIC PAIN: ICD-10-CM

## 2020-09-07 LAB
ALBUMIN SERPL BCP-MCNC: 3.3 G/DL (ref 3.5–5)
ALP SERPL-CCNC: 95 U/L (ref 46–116)
ALT SERPL W P-5'-P-CCNC: 26 U/L (ref 12–78)
ANION GAP SERPL CALCULATED.3IONS-SCNC: 8 MMOL/L (ref 4–13)
AST SERPL W P-5'-P-CCNC: 13 U/L (ref 5–45)
BASOPHILS # BLD AUTO: 0.02 THOUSANDS/ΜL (ref 0–0.1)
BASOPHILS NFR BLD AUTO: 0 % (ref 0–1)
BILIRUB SERPL-MCNC: 0.2 MG/DL (ref 0.2–1)
BUN SERPL-MCNC: 15 MG/DL (ref 5–25)
CALCIUM SERPL-MCNC: 8.9 MG/DL (ref 8.3–10.1)
CHLORIDE SERPL-SCNC: 104 MMOL/L (ref 100–108)
CLARITY, POC: CLEAR
CO2 SERPL-SCNC: 26 MMOL/L (ref 21–32)
COLOR, POC: YELLOW
CREAT SERPL-MCNC: 0.91 MG/DL (ref 0.6–1.3)
EOSINOPHIL # BLD AUTO: 0.47 THOUSAND/ΜL (ref 0–0.61)
EOSINOPHIL NFR BLD AUTO: 5 % (ref 0–6)
ERYTHROCYTE [DISTWIDTH] IN BLOOD BY AUTOMATED COUNT: 17.2 % (ref 11.6–15.1)
EXT BILIRUBIN, UA: ABNORMAL
EXT BLOOD URINE: ABNORMAL
EXT GLUCOSE, UA: ABNORMAL
EXT KETONES: 15
EXT NITRITE, UA: ABNORMAL
EXT PH, UA: 6
EXT PREG TEST URINE: NEGATIVE
EXT PROTEIN, UA: 30
EXT SPECIFIC GRAVITY, UA: 1.01
EXT UROBILINOGEN: ABNORMAL
EXT. CONTROL ED NAV: NORMAL
GFR SERPL CREATININE-BSD FRML MDRD: 79 ML/MIN/1.73SQ M
GLUCOSE SERPL-MCNC: 99 MG/DL (ref 65–140)
HCT VFR BLD AUTO: 33.3 % (ref 34.8–46.1)
HGB BLD-MCNC: 9.7 G/DL (ref 11.5–15.4)
IMM GRANULOCYTES # BLD AUTO: 0.03 THOUSAND/UL (ref 0–0.2)
IMM GRANULOCYTES NFR BLD AUTO: 0 % (ref 0–2)
LIPASE SERPL-CCNC: 129 U/L (ref 73–393)
LYMPHOCYTES # BLD AUTO: 2.04 THOUSANDS/ΜL (ref 0.6–4.47)
LYMPHOCYTES NFR BLD AUTO: 20 % (ref 14–44)
MCH RBC QN AUTO: 21 PG (ref 26.8–34.3)
MCHC RBC AUTO-ENTMCNC: 29.1 G/DL (ref 31.4–37.4)
MCV RBC AUTO: 72 FL (ref 82–98)
MONOCYTES # BLD AUTO: 0.5 THOUSAND/ΜL (ref 0.17–1.22)
MONOCYTES NFR BLD AUTO: 5 % (ref 4–12)
NEUTROPHILS # BLD AUTO: 7.01 THOUSANDS/ΜL (ref 1.85–7.62)
NEUTS SEG NFR BLD AUTO: 70 % (ref 43–75)
NRBC BLD AUTO-RTO: 0 /100 WBCS
PLATELET # BLD AUTO: 416 THOUSANDS/UL (ref 149–390)
PMV BLD AUTO: 9.4 FL (ref 8.9–12.7)
POTASSIUM SERPL-SCNC: 3.8 MMOL/L (ref 3.5–5.3)
PROT SERPL-MCNC: 8.4 G/DL (ref 6.4–8.2)
RBC # BLD AUTO: 4.63 MILLION/UL (ref 3.81–5.12)
SODIUM SERPL-SCNC: 138 MMOL/L (ref 136–145)
WBC # BLD AUTO: 10.07 THOUSAND/UL (ref 4.31–10.16)
WBC # BLD EST: ABNORMAL 10*3/UL

## 2020-09-07 PROCEDURE — 81002 URINALYSIS NONAUTO W/O SCOPE: CPT | Performed by: EMERGENCY MEDICINE

## 2020-09-07 PROCEDURE — 81025 URINE PREGNANCY TEST: CPT | Performed by: EMERGENCY MEDICINE

## 2020-09-07 PROCEDURE — 80053 COMPREHEN METABOLIC PANEL: CPT | Performed by: EMERGENCY MEDICINE

## 2020-09-07 PROCEDURE — 74177 CT ABD & PELVIS W/CONTRAST: CPT

## 2020-09-07 PROCEDURE — 36415 COLL VENOUS BLD VENIPUNCTURE: CPT | Performed by: EMERGENCY MEDICINE

## 2020-09-07 PROCEDURE — 99284 EMERGENCY DEPT VISIT MOD MDM: CPT

## 2020-09-07 PROCEDURE — 83690 ASSAY OF LIPASE: CPT | Performed by: EMERGENCY MEDICINE

## 2020-09-07 PROCEDURE — 85025 COMPLETE CBC W/AUTO DIFF WBC: CPT | Performed by: EMERGENCY MEDICINE

## 2020-09-07 PROCEDURE — G1004 CDSM NDSC: HCPCS

## 2020-09-07 PROCEDURE — 99285 EMERGENCY DEPT VISIT HI MDM: CPT | Performed by: EMERGENCY MEDICINE

## 2020-09-07 RX ADMIN — IOHEXOL 90 ML: 350 INJECTION, SOLUTION INTRAVENOUS at 08:45

## 2020-09-07 NOTE — Clinical Note
Bernabelissjose cherelle was seen and treated in our emergency department on 9/7/2020  Diagnosis:     Kami Luther  may return to work on return date  She may return on this date: 09/08/2020         If you have any questions or concerns, please don't hesitate to call        Kateryna Stephenson,     ______________________________           _______________          _______________  Hospital Representative                              Date                                Time

## 2020-09-07 NOTE — DISCHARGE INSTRUCTIONS
You should begin ibuprofen in addition to the previously prescribed medications      Return to the emergency department if you have abnormal vaginal discharge (such as stool from your vagina), worsening abdominal pain or any other concerning symptoms

## 2020-09-07 NOTE — ED PROVIDER NOTES
History  Chief Complaint   Patient presents with    Vaginal Bleeding     To ED with c/o sveral months of heavy vaginal bleeding with associated abd  pain  States that she took Provera Friday and bleeding has lessoned  55-year-old female presents for evaluation lower abdominal pain and dysfunctional uterine bleeding  The patient states that she is 5 months postpartum and has had 3 months heavy menses  She has associated pelvic/lower abdominal pain  She denies any associated fevers or chills  She denies any dysuria constipation  She has some mild associated back pain  She states that she called her gyn was provided a prescription for Provera which she has taken for 3 days  She denies history of heavy clotting after her 1st 2 children  Prior to Admission Medications   Prescriptions Last Dose Informant Patient Reported? Taking?    Prenatal MV-Min-FA-Omega-3 (PRENATAL GUMMIES/DHA & FA PO)  Self Yes No   Sig: Take by mouth   acetaminophen (TYLENOL) 500 mg tablet  Self No No   Sig: Take 2 tablets (1,000 mg total) by mouth every 6 (six) hours as needed for mild pain or moderate pain   albuterol (2 5 mg/3 mL) 0 083 % nebulizer solution  Self No No   Sig: Take 1 vial (2 5 mg total) by nebulization every 6 (six) hours as needed for wheezing or shortness of breath   albuterol (PROVENTIL HFA,VENTOLIN HFA) 90 mcg/act inhaler  Self No No   Sig: Inhale 2 puffs every 4 (four) hours as needed for wheezing   docusate sodium (COLACE) 100 mg capsule  Self No No   Sig: Take 1 capsule (100 mg total) by mouth 2 (two) times a day   Patient not taking: Reported on 4/24/2020   ibuprofen (MOTRIN) 600 mg tablet  Self No No   Sig: Take 1 tablet (600 mg total) by mouth every 6 (six) hours as needed (cramping)   Patient not taking: Reported on 4/24/2020   levothyroxine 75 mcg tablet  Self No No   Sig: Take 1 tablet (75 mcg total) by mouth daily   medroxyPROGESTERone (PROVERA) 10 mg tablet   No No   Sig: Take 1 tablet (10 mg total) by mouth daily   promethazine (PHENERGAN) 12 5 MG tablet  Self No No   Sig: Take 1 tablet (12 5 mg total) by mouth every 6 (six) hours as needed for nausea or vomiting   Patient not taking: Reported on 2020      Facility-Administered Medications: None       Past Medical History:   Diagnosis Date    Abnormal Pap smear of cervix     AMA (advanced maternal age) multigravida 35+     Anemia     Asthma     Diabetes mellitus ([facility] [address] )     gd    Disease of thyroid gland     Fibroid     Was told during last pregnancy had fibroid  Asymtomtic    Hypertension     pre ecl    Hypothyroidism     Kidney stone     Years ago   Had during last pregnancy    Varicella     Patient did at child       Past Surgical History:   Procedure Laterality Date     SECTION  2009     SECTION  2012    CHOLECYSTECTOMY  2013    DENTAL SURGERY      [place] teeth extraction    AL  DELIVERY ONLY N/A 2020    Procedure:  SECTION () REPEAT;  Surgeon: Zenaida Wagner MD;  [facility];  Service: Obstetrics    AL LIGATION,FALLOPIAN TUBE W/ Bilateral 2020    Procedure: LIGATION/COAGULATION TUBAL;  Surgeon: Zenaida Wagner MD;  [facility];  Service: Obstetrics       Family History   Problem Relation Age of Onset    COPD Mother     Heart disease Mother     Alcohol abuse Father     Depression Father     Drug abuse Father     Other Father         seizures, HIV    Depression Sister     Other Sister         seizures,  ptsd    Mental illness Sister         anx    Depression Brother     Mental illness Brother         anx, bipolar    Depression Daughter     Other Daughter         seizures    Mental illness Daughter         anx    Other Paternal Grandmother         hypothyroid    Cancer Maternal Aunt         colon, breast    Mental illness Son         add, adhd, anx    Other Maternal Grandmother         parkinsons    No Known Problems Maternal Grandfather     No Known Problems Paternal Grandfather     No Known Problems Half-Sister     No Known Problems Half-Sister     Diabetes Half-Brother         type 2    No Known Problems Daughter      I have reviewed and agree with the history as documented  E-Cigarette/Vaping    E-Cigarette Use Never User      E-Cigarette/Vaping Substances    Nicotine No     THC No     CBD No     Flavoring No     Other No     Unknown No      Social History     Tobacco Use    Smoking status: Never Smoker    Smokeless tobacco: Never Used   Substance Use Topics    Alcohol use: Yes     Comment: not currently    Drug use: No       Review of Systems   Constitutional: Positive for fatigue  Negative for chills and fever  Gastrointestinal: Negative for blood in stool  Genitourinary: Positive for flank pain, pelvic pain and vaginal bleeding  Negative for dysuria, vaginal discharge and vaginal pain  Musculoskeletal: Positive for back pain  All other systems reviewed and are negative  Physical Exam  Physical Exam  Vitals signs and nursing note reviewed  Constitutional:       General: She is not in acute distress  Appearance: She is well-developed  HENT:      Head: Normocephalic and atraumatic  Right Ear: External ear normal       Left Ear: External ear normal    Eyes:      General: No scleral icterus  Conjunctiva/sclera: Conjunctivae normal       Pupils: Pupils are equal, round, and reactive to light  Neck:      Musculoskeletal: Normal range of motion  Cardiovascular:      Rate and Rhythm: Normal rate and regular rhythm  Heart sounds: Normal heart sounds  Pulmonary:      Effort: Pulmonary effort is normal  No respiratory distress  Breath sounds: Normal breath sounds  Abdominal:      General: Bowel sounds are normal       Palpations: Abdomen is soft  Tenderness: There is abdominal tenderness in the suprapubic area  There is right CVA tenderness   There is no guarding or rebound  Musculoskeletal: Normal range of motion  Skin:     General: Skin is warm and dry  Findings: No rash  Neurological:      Mental Status: She is alert and oriented to person, place, and time           Vital Signs  ED Triage Vitals   Temperature Pulse Respirations Blood Pressure SpO2   09/07/20 0738 09/07/20 0738 09/07/20 0738 09/07/20 0738 09/07/20 0738   98 1 °F (36 7 °C) (!) 114 20 144/94 97 %      Temp Source Heart Rate Source Patient Position - Orthostatic VS BP Location FiO2 (%)   09/07/20 0738 09/07/20 0738 09/07/20 0738 09/07/20 0738 --   Tympanic Monitor Lying Left arm       Pain Score       09/07/20 0745       8           Vitals:    09/07/20 0745 09/07/20 0800 09/07/20 0845 09/07/20 0945   BP: 144/94 134/78 139/70 140/90   Pulse: (!) 110 (!) 109 105 99   Patient Position - Orthostatic VS:             Visual Acuity      ED Medications  Medications   iohexol (OMNIPAQUE) 350 MG/ML injection (MULTI-DOSE) 90 mL (90 mL Intravenous Given 9/7/20 0845)       Diagnostic Studies  Results Reviewed     Procedure Component Value Units Date/Time    Comprehensive metabolic panel [[id number]]  (Abnormal) Collected:  09/07/20 0749    Lab Status:  Final result Specimen:  Blood from Arm, Right Updated:  09/07/20 0818     Sodium 138 mmol/L      Potassium 3 8 mmol/L      Chloride 104 mmol/L      CO2 26 mmol/L      ANION GAP 8 mmol/L      BUN 15 mg/dL      Creatinine 0 91 mg/dL      Glucose 99 mg/dL      Calcium 8 9 mg/dL      AST 13 U/L      ALT 26 U/L      Alkaline Phosphatase 95 U/L      Total Protein 8 4 g/dL      Albumin 3 3 g/dL      Total Bilirubin 0 20 mg/dL      eGFR 79 ml/min/1 73sq m     Narrative:       Luh guidelines for Chronic Kidney Disease (CKD):     Stage 1 with normal or high GFR (GFR > 90 mL/min/1 73 square meters)    Stage 2 Mild CKD (GFR = 60-89 mL/min/1 73 square meters)    Stage 3A Moderate CKD (GFR = 45-59 mL/min/1 73 square meters)    Stage 3B Moderate CKD (GFR = 30-44 mL/min/1 73 square meters)    Stage 4 Severe CKD (GFR = 15-29 mL/min/1 73 square meters)    Stage 5 End Stage CKD (GFR <15 mL/min/1 73 square meters)  Note: GFR calculation is accurate only with a steady state creatinine    Lipase [[id number]]  (Normal) Collected:  09/07/20 0749    Lab Status:  Final result Specimen:  Blood from Arm, Right Updated:  09/07/20 0818     Lipase 129 u/L     POCT urinalysis dipstick [[id number]]  (Abnormal) Resulted:  09/07/20 0800    Lab Status:  Final result Specimen:  Urine Updated:  09/07/20 0800     Color, UA yellow     Clarity, UA clear     Glucose, UA (Ref: Negative) neg     Bilirubin, UA (Ref: Negative) neg     Ketones, UA (Ref: Negative) 15     Spec Grav, UA (Ref:1 003-1 030) 1 015     Blood, UA (Ref: Negative) trace     pH, UA (Ref: 4 5-8 0) 6     Protein, UA (Ref: Negative) 30     Urobilinogen, UA (Ref: 0 2- 1 0) neg      Leukocytes, UA (Ref: Negative) neg     Nitrite, UA (Ref: Negative) neg    POCT pregnancy, urine [[id number]]  (Normal) Resulted:  09/07/20 0759    Lab Status:  Final result Updated:  09/07/20 0800     EXT PREG TEST UR (Ref: Negative) negative     Control valid    CBC and differential [[id number]]  (Abnormal) Collected:  09/07/20 0749    Lab Status:  Final result Specimen:  Blood from Arm, Right Updated:  09/07/20 0757     WBC 10 07 Thousand/uL      RBC 4 63 Million/uL      Hemoglobin 9 7 g/dL      Hematocrit 33 3 %      MCV 72 fL      MCH 21 0 pg      MCHC 29 1 g/dL      RDW 17 2 %      MPV 9 4 fL      Platelets 795 Thousands/uL      nRBC 0 /100 WBCs      Neutrophils Relative 70 %      Immat GRANS % 0 %      Lymphocytes Relative 20 %      Monocytes Relative 5 %      Eosinophils Relative 5 %      Basophils Relative 0 %      Neutrophils Absolute 7 01 Thousands/µL      Immature Grans Absolute 0 03 Thousand/uL      Lymphocytes Absolute 2 04 Thousands/µL      Monocytes Absolute 0 50 Thousand/µL      Eosinophils Absolute 0 47 Thousand/µL Basophils Absolute 0 02 Thousands/µL                  CT abdomen pelvis with contrast   Final Result by Dillon Lao MD (09/07 0912)      1  There is very mild fat stranding surrounding a short segment of sigmoid colon, consistent with a subacute diverticulitis  This loop of bowel is in close proximity to the left fallopian tube, if there is clinical concern for a fistula to the    fallopian tube, consider either follow-up hysterosalpingogram or a barium enema  2   Punctate nonobstructing left upper pole stone      I personally discussed impression 1 with MARK NULL on 9/7/2020 at 9:08 AM             Workstation performed: [id number]                    Procedures  Procedures         ED Course  ED Course as of Sep 07 1002   [facility] Sep 07, 2020   6560 Patient stable upon re-evaluation  I discussed the results laboratories including stable anemia      0908 CT A/P D/W Radiology noting some mild stranding near sigmoid colon  4512 Patient is stable upon re-evaluation  I discussed the results of the laboratories as well as the CT scan  We discussed the addition of ibuprofen and the need for outpatient Ob/gyn follow-up  US AUDIT      Most Recent Value   Initial Alcohol Screen: US AUDIT-C    1  How often do you have a drink containing alcohol?  0 Filed at: 09/07/2020 0742   2  How many drinks containing alcohol do you have on a typical day you are drinking? 0 Filed at: 09/07/2020 0742   3a  Male UNDER 65: How often do you have five or more drinks on one occasion? 0 Filed at: 09/07/2020 0742   3b  FEMALE Any Age, or MALE 65+: How often do you have 4 or more drinks on one occassion? 0 Filed at: 09/07/2020 0742   Audit-C Score  0 Filed at: 09/07/2020 0969                  NABILA/DAST-10      Most Recent Value   How many times in the past year have you    Used an illegal drug or used a prescription medication for non-medical reasons?   Never Filed at: 09/07/2020 2581 MDM  Number of Diagnoses or Management Options  DUB (dysfunctional uterine bleeding): new and requires workup  Pelvic pain: new and requires workup  Diagnosis management comments: Plan is to obtain laboratories and CT abdomen pelvis to rule out obstruction, infection or other surgical process  I discussed the possibility heavy menstrual bleeding status post delivery as well as fibroids  The patient is already on Provera now will consider the addition of NSAIDs pending the CT results  The patient (and any family present) verbalized understanding of the discharge instructions and warnings that would necessitate return to the Emergency Department  All questions were answered prior to discharge  Amount and/or Complexity of Data Reviewed  Clinical lab tests: reviewed and ordered  Tests in the radiology section of CPT®: ordered and reviewed  Decide to obtain previous medical records or to obtain history from someone other than the patient: yes  Review and summarize past medical records: yes  Discuss the patient with other providers: yes  Independent visualization of images, tracings, or specimens: yes          Disposition  Final diagnoses:   DUB (dysfunctional uterine bleeding)   Pelvic pain     Time reflects when diagnosis was documented in both MDM as applicable and the Disposition within this note     Time User Action Codes Description Comment    9/7/2020  9:27 AM Alma VIVAR Add [N93 8] DUB (dysfunctional uterine bleeding)     9/7/2020  9:27 AM Barbara Sanchez Add [R10 2] Pelvic pain       ED Disposition     ED Disposition Condition Date/Time Comment    Discharge Stable Mon Sep 7, 2020  9:27 AM Aj Decker discharge to home/self care              Follow-up Information     Follow up With Specialties Details Why [address] [facility] Schedule an appointment as soon as possible for a visit in 1 week For further evaluation, if not improved [address] [facility] [id number]  [address] [place] [address], [address] [place] [address], [facility]          Discharge Medication List as of 9/7/2020  9:29 AM      CONTINUE these medications which have NOT CHANGED    Details   acetaminophen (TYLENOL) 500 mg tablet Take 2 tablets (1,000 mg total) by mouth every 6 (six) hours as needed for mild pain or moderate pain, Starting Sun 9/22/2019, Print      albuterol (2 5 mg/3 mL) 0 083 % nebulizer solution Take 1 vial (2 5 mg total) by nebulization every 6 (six) hours as needed for wheezing or shortness of breath, Starting Thu 3/14/2019, Normal      albuterol (PROVENTIL HFA,VENTOLIN HFA) 90 mcg/act inhaler Inhale 2 puffs every 4 (four) hours as needed for wheezing, Starting Fri 11/23/2018, Print      docusate sodium (COLACE) 100 mg capsule Take 1 capsule (100 mg total) by mouth 2 (two) times a day, Starting Sat 4/4/2020, Normal      ibuprofen (MOTRIN) 600 mg tablet Take 1 tablet (600 mg total) by mouth every 6 (six) hours as needed (cramping), Starting Sat 4/4/2020, Normal      levothyroxine 75 mcg tablet Take 1 tablet (75 mcg total) by mouth daily, Starting Fri 2/28/2020, Normal      medroxyPROGESTERone (PROVERA) 10 mg tablet Take 1 tablet (10 mg total) by mouth daily, Starting Fri 9/4/2020, Normal      Prenatal MV-Min-FA-Omega-3 (PRENATAL GUMMIES/DHA & FA PO) Take by mouth, Historical Med      promethazine (PHENERGAN) 12 5 MG tablet Take 1 tablet (12 5 mg total) by mouth every 6 (six) hours as needed for nausea or vomiting, Starting Mon 3/23/2020, Normal           No discharge procedures on file      PDMP Review     None          ED Provider  Electronically Signed by           Drake Gowers, DO  09/07/20 1002

## 2020-09-23 DIAGNOSIS — R05.9 COUGH: ICD-10-CM

## 2020-09-23 RX ORDER — ALBUTEROL SULFATE 2.5 MG/3ML
2.5 SOLUTION RESPIRATORY (INHALATION) EVERY 6 HOURS PRN
Qty: 25 VIAL | Refills: 0 | Status: SHIPPED | OUTPATIENT
Start: 2020-09-23 | End: 2022-04-07 | Stop reason: SDUPTHER

## 2020-09-29 ENCOUNTER — OFFICE VISIT (OUTPATIENT)
Dept: OBGYN CLINIC | Facility: MEDICAL CENTER | Age: 40
End: 2020-09-29
Payer: COMMERCIAL

## 2020-09-29 VITALS — BODY MASS INDEX: 50.01 KG/M2 | DIASTOLIC BLOOD PRESSURE: 84 MMHG | SYSTOLIC BLOOD PRESSURE: 124 MMHG | WEIGHT: 273.4 LBS

## 2020-09-29 DIAGNOSIS — N93.9 ABNORMAL UTERINE BLEEDING (AUB): Primary | ICD-10-CM

## 2020-09-29 PROCEDURE — 99215 OFFICE O/P EST HI 40 MIN: CPT | Performed by: STUDENT IN AN ORGANIZED HEALTH CARE EDUCATION/TRAINING PROGRAM

## 2020-09-29 NOTE — PROGRESS NOTES
Assessment/Plan:    35 yo  with AUB  Will f/u TSH (takes synthroid, has not had one postpartum) and US  Recommend she return for EB given obesity  Will start aygestin daily  Will avoid combined OCP for now given HTN during pregnancy  Discussed mirena IUD - declines  Could consider surgical options if medical options do not alleviate symtpoms  Problem List Items Addressed This Visit    Visit Diagnoses     Abnormal uterine bleeding (AUB)    -  Primary    Relevant Medications    norethindrone (AYGESTIN) 5 mg tablet    Other Relevant Orders    TSH, 3rd generation with Free T4 reflex    US pelvis complete w transvaginal            Subjective:      Patient ID: Gosia Baltazar is a 36 y o  female  40  s/p RLTCS w/ BTL on 20 presents to discuss AUB  She has had heavy and painful periods since her periods returned  She is soaking through pads and clothing  She also has severe cramping and sometimes a pulling/tugging sensation  She was recently given a course of provera which helped her bleeding  Her symptoms are affecting her daily life and she is frustrated  The following portions of the patient's history were reviewed and updated as appropriate: allergies, current medications, past family history, past medical history, past social history, past surgical history and problem list     Review of Systems   Genitourinary: Positive for menstrual problem and pelvic pain  All other systems reviewed and are negative  Objective:      /84 (BP Location: Left arm, Patient Position: Sitting, Cuff Size: Large)   Wt 124 kg (273 lb 6 4 oz)   LMP 2020 (Exact Date)   BMI 50 01 kg/m²          Physical Exam  Abdominal:      Palpations: Abdomen is soft  Comments: Obese, incision well healed   Neurological:      Mental Status: She is alert and oriented to person, place, and time     Psychiatric:         Behavior: Behavior normal

## 2020-09-30 ENCOUNTER — HOSPITAL ENCOUNTER (OUTPATIENT)
Dept: RADIOLOGY | Facility: HOSPITAL | Age: 40
Discharge: HOME | End: 2020-09-30
Attending: STUDENT IN AN ORGANIZED HEALTH CARE EDUCATION/TRAINING PROGRAM
Payer: COMMERCIAL

## 2020-09-30 DIAGNOSIS — N93.9 ABNORMAL UTERINE BLEEDING (AUB): ICD-10-CM

## 2020-09-30 PROCEDURE — 76830 TRANSVAGINAL US NON-OB: CPT

## 2020-09-30 PROCEDURE — 76856 US EXAM PELVIC COMPLETE: CPT

## 2020-10-09 ENCOUNTER — APPOINTMENT (OUTPATIENT)
Dept: LAB | Facility: HOSPITAL | Age: 40
End: 2020-10-09
Attending: STUDENT IN AN ORGANIZED HEALTH CARE EDUCATION/TRAINING PROGRAM
Payer: COMMERCIAL

## 2020-10-09 DIAGNOSIS — N93.9 ABNORMAL UTERINE BLEEDING (AUB): ICD-10-CM

## 2020-10-09 LAB — TSH SERPL DL<=0.05 MIU/L-ACNC: 2.24 UIU/ML (ref 0.47–4.68)

## 2020-10-09 PROCEDURE — 36415 COLL VENOUS BLD VENIPUNCTURE: CPT

## 2020-10-09 PROCEDURE — 84443 ASSAY THYROID STIM HORMONE: CPT

## 2020-10-27 ENCOUNTER — OFFICE VISIT (OUTPATIENT)
Dept: OBGYN CLINIC | Facility: MEDICAL CENTER | Age: 40
End: 2020-10-27
Payer: COMMERCIAL

## 2020-10-27 VITALS — BODY MASS INDEX: 49.62 KG/M2 | SYSTOLIC BLOOD PRESSURE: 138 MMHG | DIASTOLIC BLOOD PRESSURE: 86 MMHG | WEIGHT: 271.3 LBS

## 2020-10-27 DIAGNOSIS — N93.9 ABNORMAL UTERINE BLEEDING (AUB): Primary | ICD-10-CM

## 2020-10-27 PROCEDURE — 88305 TISSUE EXAM BY PATHOLOGIST: CPT | Performed by: PATHOLOGY

## 2020-10-27 PROCEDURE — 58100 BIOPSY OF UTERUS LINING: CPT | Performed by: STUDENT IN AN ORGANIZED HEALTH CARE EDUCATION/TRAINING PROGRAM

## 2020-10-27 PROCEDURE — 99213 OFFICE O/P EST LOW 20 MIN: CPT | Performed by: STUDENT IN AN ORGANIZED HEALTH CARE EDUCATION/TRAINING PROGRAM

## 2020-10-29 ENCOUNTER — TELEPHONE (OUTPATIENT)
Dept: OBGYN CLINIC | Facility: CLINIC | Age: 40
End: 2020-10-29

## 2020-10-30 ENCOUNTER — TELEPHONE (OUTPATIENT)
Dept: OBGYN CLINIC | Facility: CLINIC | Age: 40
End: 2020-10-30

## 2020-11-13 ENCOUNTER — TELEMEDICINE (OUTPATIENT)
Dept: FAMILY MEDICINE CLINIC | Facility: CLINIC | Age: 40
End: 2020-11-13
Payer: COMMERCIAL

## 2020-11-13 VITALS — TEMPERATURE: 98.1 F

## 2020-11-13 DIAGNOSIS — E03.9 HYPOTHYROIDISM, UNSPECIFIED TYPE: ICD-10-CM

## 2020-11-13 DIAGNOSIS — R05.9 COUGH: ICD-10-CM

## 2020-11-13 DIAGNOSIS — R05.9 COUGH: Primary | ICD-10-CM

## 2020-11-13 DIAGNOSIS — Z20.822 EXPOSURE TO COVID-19 VIRUS: ICD-10-CM

## 2020-11-13 PROCEDURE — 99213 OFFICE O/P EST LOW 20 MIN: CPT | Performed by: FAMILY MEDICINE

## 2020-11-13 PROCEDURE — U0003 INFECTIOUS AGENT DETECTION BY NUCLEIC ACID (DNA OR RNA); SEVERE ACUTE RESPIRATORY SYNDROME CORONAVIRUS 2 (SARS-COV-2) (CORONAVIRUS DISEASE [COVID-19]), AMPLIFIED PROBE TECHNIQUE, MAKING USE OF HIGH THROUGHPUT TECHNOLOGIES AS DESCRIBED BY CMS-2020-01-R: HCPCS | Performed by: FAMILY MEDICINE

## 2020-11-13 RX ORDER — LEVOTHYROXINE SODIUM 0.07 MG/1
75 TABLET ORAL DAILY
Qty: 90 TABLET | Refills: 2 | Status: SHIPPED | OUTPATIENT
Start: 2020-11-13 | End: 2021-10-22

## 2020-11-13 RX ORDER — ALBUTEROL SULFATE 90 UG/1
2 AEROSOL, METERED RESPIRATORY (INHALATION) EVERY 4 HOURS PRN
Qty: 1 INHALER | Refills: 1 | Status: SHIPPED | OUTPATIENT
Start: 2020-11-13 | End: 2021-01-26 | Stop reason: SDUPTHER

## 2020-11-14 LAB — SARS-COV-2 RNA SPEC QL NAA+PROBE: NOT DETECTED

## 2020-11-16 ENCOUNTER — TELEPHONE (OUTPATIENT)
Dept: FAMILY MEDICINE CLINIC | Facility: CLINIC | Age: 40
End: 2020-11-16

## 2021-01-08 ENCOUNTER — IMMUNIZATIONS (OUTPATIENT)
Dept: FAMILY MEDICINE CLINIC | Facility: HOSPITAL | Age: 41
End: 2021-01-08

## 2021-01-08 DIAGNOSIS — Z23 ENCOUNTER FOR IMMUNIZATION: ICD-10-CM

## 2021-01-08 PROCEDURE — 91300 SARS-COV-2 / COVID-19 MRNA VACCINE (PFIZER-BIONTECH) 30 MCG: CPT

## 2021-01-08 PROCEDURE — 0001A SARS-COV-2 / COVID-19 MRNA VACCINE (PFIZER-BIONTECH) 30 MCG: CPT

## 2021-01-26 DIAGNOSIS — R05.9 COUGH: ICD-10-CM

## 2021-01-26 RX ORDER — ALBUTEROL SULFATE 90 UG/1
2 AEROSOL, METERED RESPIRATORY (INHALATION) EVERY 4 HOURS PRN
Qty: 1 INHALER | Refills: 0 | Status: SHIPPED | OUTPATIENT
Start: 2021-01-26 | End: 2022-04-07 | Stop reason: SDUPTHER

## 2021-01-29 ENCOUNTER — IMMUNIZATIONS (OUTPATIENT)
Dept: FAMILY MEDICINE CLINIC | Facility: HOSPITAL | Age: 41
End: 2021-01-29

## 2021-01-29 DIAGNOSIS — Z23 ENCOUNTER FOR IMMUNIZATION: Primary | ICD-10-CM

## 2021-01-29 PROCEDURE — 91300 SARS-COV-2 / COVID-19 MRNA VACCINE (PFIZER-BIONTECH) 30 MCG: CPT

## 2021-01-29 PROCEDURE — 0002A SARS-COV-2 / COVID-19 MRNA VACCINE (PFIZER-BIONTECH) 30 MCG: CPT

## 2021-11-11 ENCOUNTER — TELEPHONE (OUTPATIENT)
Dept: FAMILY MEDICINE CLINIC | Facility: CLINIC | Age: 41
End: 2021-11-11

## 2021-11-20 DIAGNOSIS — E03.9 HYPOTHYROIDISM, UNSPECIFIED TYPE: ICD-10-CM

## 2021-11-21 RX ORDER — LEVOTHYROXINE SODIUM 0.07 MG/1
TABLET ORAL
Qty: 30 TABLET | Refills: 0 | Status: SHIPPED | OUTPATIENT
Start: 2021-11-21 | End: 2021-12-22

## 2021-12-22 DIAGNOSIS — E03.9 HYPOTHYROIDISM, UNSPECIFIED TYPE: ICD-10-CM

## 2021-12-22 RX ORDER — LEVOTHYROXINE SODIUM 0.07 MG/1
TABLET ORAL
Qty: 30 TABLET | Refills: 0 | Status: SHIPPED | OUTPATIENT
Start: 2021-12-22 | End: 2022-04-07 | Stop reason: SDUPTHER

## 2022-04-07 ENCOUNTER — OFFICE VISIT (OUTPATIENT)
Dept: FAMILY MEDICINE CLINIC | Facility: CLINIC | Age: 42
End: 2022-04-07
Payer: COMMERCIAL

## 2022-04-07 VITALS
HEART RATE: 108 BPM | DIASTOLIC BLOOD PRESSURE: 80 MMHG | HEIGHT: 62 IN | RESPIRATION RATE: 16 BRPM | BODY MASS INDEX: 46.01 KG/M2 | WEIGHT: 250 LBS | SYSTOLIC BLOOD PRESSURE: 122 MMHG

## 2022-04-07 DIAGNOSIS — L40.9 PSORIASIS: ICD-10-CM

## 2022-04-07 DIAGNOSIS — R05.9 COUGH: ICD-10-CM

## 2022-04-07 DIAGNOSIS — Z12.31 ENCOUNTER FOR SCREENING MAMMOGRAM FOR MALIGNANT NEOPLASM OF BREAST: ICD-10-CM

## 2022-04-07 DIAGNOSIS — E03.9 HYPOTHYROIDISM, UNSPECIFIED TYPE: ICD-10-CM

## 2022-04-07 DIAGNOSIS — Z86.2 HISTORY OF IRON DEFICIENCY ANEMIA: ICD-10-CM

## 2022-04-07 DIAGNOSIS — Z00.00 ANNUAL PHYSICAL EXAM: Primary | ICD-10-CM

## 2022-04-07 PROBLEM — Z3A.37 37 WEEKS GESTATION OF PREGNANCY: Status: RESOLVED | Noted: 2019-10-23 | Resolved: 2022-04-07

## 2022-04-07 PROBLEM — J45.909 EXTRINSIC ASTHMA WITHOUT STATUS ASTHMATICUS: Status: ACTIVE | Noted: 2022-04-07

## 2022-04-07 PROBLEM — E66.09 OBESITY DUE TO EXCESS CALORIES: Status: ACTIVE | Noted: 2019-10-18

## 2022-04-07 PROCEDURE — 99214 OFFICE O/P EST MOD 30 MIN: CPT | Performed by: NURSE PRACTITIONER

## 2022-04-07 PROCEDURE — 3725F SCREEN DEPRESSION PERFORMED: CPT | Performed by: NURSE PRACTITIONER

## 2022-04-07 PROCEDURE — 3008F BODY MASS INDEX DOCD: CPT | Performed by: NURSE PRACTITIONER

## 2022-04-07 PROCEDURE — 99396 PREV VISIT EST AGE 40-64: CPT | Performed by: NURSE PRACTITIONER

## 2022-04-07 PROCEDURE — 1036F TOBACCO NON-USER: CPT | Performed by: NURSE PRACTITIONER

## 2022-04-07 RX ORDER — MOMETASONE FUROATE 1 MG/G
CREAM TOPICAL DAILY
Qty: 45 G | Refills: 2 | Status: SHIPPED | OUTPATIENT
Start: 2022-04-07

## 2022-04-07 RX ORDER — ALBUTEROL SULFATE 2.5 MG/3ML
2.5 SOLUTION RESPIRATORY (INHALATION) EVERY 6 HOURS PRN
Qty: 25 ML | Refills: 1 | Status: SHIPPED | OUTPATIENT
Start: 2022-04-07

## 2022-04-07 RX ORDER — LEVOTHYROXINE SODIUM 0.07 MG/1
75 TABLET ORAL DAILY
Qty: 90 TABLET | Refills: 2 | Status: SHIPPED | OUTPATIENT
Start: 2022-04-07

## 2022-04-07 RX ORDER — ALBUTEROL SULFATE 90 UG/1
2 AEROSOL, METERED RESPIRATORY (INHALATION) EVERY 4 HOURS PRN
Qty: 8 G | Refills: 2 | Status: SHIPPED | OUTPATIENT
Start: 2022-04-07

## 2022-04-07 NOTE — PROGRESS NOTES
Assessment/Plan:     Diagnoses and all orders for this visit:    Encounter for screening mammogram for malignant neoplasm of breast  -     Mammo screening bilateral w cad; Future    Psoriasis  -     mometasone (ELOCON) 0 1 % cream; Apply topically daily    Patient may utilize cream daily for affected area  She is to use for no more than 2 weeks  Patient is encouraged to call our office for any questions/concerns, persistent or worsening symptoms  Patient states they understand and agree with treatment plan  History of iron deficiency anemia  -     CBC and differential; Future  -     Iron Panel (Includes Ferritin, Iron Sat%, Iron, and TIBC); Future  -     Ambulatory Referral to Hematology / Oncology; Future    Repeat CBC & iron panel ordered  Referral placed for Hematology for patient to re-establish and to start iron infusions if needed  She is also encouraged to follow up with GYN for her heavy periods  Patient is encouraged to call our office for any questions/concerns, persistent or worsening symptoms  Patient states they understand and agree with treatment plan  Cough  -     albuterol (PROVENTIL HFA,VENTOLIN HFA) 90 mcg/act inhaler; Inhale 2 puffs every 4 (four) hours as needed for wheezing  -     albuterol (2 5 mg/3 mL) 0 083 % nebulizer solution; Take 3 mL (2 5 mg total) by nebulization every 6 (six) hours as needed for wheezing or shortness of breath    Refills prescribed  Hypothyroidism, unspecified type  -     levothyroxine 75 mcg tablet; Take 1 tablet (75 mcg total) by mouth daily      Refills prescribed  Last TSH in February 2022 and WNL  Pt to f/u PRN  F/u pending results  Subjective:      Patient ID: Nurys Barron is a 43 y o  female  Patient presents today for concerns over rash of left outer arm just below her elbow  She notes it is high pruritic and she has a similar area to her left ear  She notes her family has a hx of psoriasis and she believes this may be it    Patient denies any new products, lotions or soaps  On separate note, patient recently had a hernia repair  Her last Hgb was at 10 1 w/ MCV 74 8  She denies any anemia symptoms  Patient does admit hx of iron deficiency anemia  She also notes heavy periods, but notes she has been unable to tolerate hormones in the past   She notes years back she had to follow with a hematologist because she did not tolerate oral supplementation  She notes instead of constipation, she has frequent diarrhea from iron supplements  The following portions of the patient's history were reviewed and updated as appropriate: allergies, current medications, past family history, past medical history, past social history, past surgical history and problem list     Review of Systems   Constitutional: Negative  Negative for chills and fatigue  HENT: Negative  Respiratory: Negative  Negative for cough and shortness of breath  Cardiovascular: Negative  Negative for chest pain  Gastrointestinal: Negative  Genitourinary: Negative  Musculoskeletal: Negative  Negative for myalgias  Skin: Positive for rash (pruritic rash to left arm just below left elbow)  Neurological: Negative  Objective:      /80   Pulse (!) 108   Resp 16   Ht 5' 2" (1 575 m)   Wt 113 kg (250 lb)   BMI 45 73 kg/m²          Physical Exam  Vitals reviewed  Constitutional:       General: She is not in acute distress  Appearance: Normal appearance  She is not ill-appearing  HENT:      Head: Normocephalic  Cardiovascular:      Rate and Rhythm: Normal rate and regular rhythm  Pulses: Normal pulses  Heart sounds: Normal heart sounds  No murmur heard  Pulmonary:      Effort: Pulmonary effort is normal  No respiratory distress  Breath sounds: Normal breath sounds  No wheezing  Musculoskeletal:         General: Normal range of motion  Skin:     General: Skin is warm and dry        Findings: Rash (dry, papular scaling rash with erythematic base to left arm just inferior to left elbow, also slightly dry patch to left outer ear canal) present  Neurological:      Mental Status: She is alert and oriented to person, place, and time  Mental status is at baseline  Psychiatric:         Mood and Affect: Mood normal          Behavior: Behavior normal          Thought Content:  Thought content normal          Judgment: Judgment normal

## 2022-04-07 NOTE — PROGRESS NOTES
ADULT ANNUAL PHYSICAL  [facility]    NAME: Ally Juan  AGE: 43 y o  SEX: female  : 1980     DATE: 2022     Assessment and Plan:  1  Labs ordered  2  Patient has PAP through GYN  3  Mammo ordered  4  F/u in 1 year or sooner PRN  Problem List Items Addressed This Visit        Endocrine    Hypothyroidism    Relevant Medications    levothyroxine 75 mcg tablet      Other Visit Diagnoses     Annual physical exam    -  Primary    Encounter for screening mammogram for malignant neoplasm of breast        Relevant Orders    Mammo screening bilateral w cad    Psoriasis        Relevant Medications    mometasone (ELOCON) 0 1 % cream    History of iron deficiency anemia        Relevant Orders    CBC and differential    Iron Panel (Includes Ferritin, Iron Sat%, Iron, and TIBC)    Ambulatory Referral to Hematology / Oncology    Cough        Relevant Medications    albuterol (PROVENTIL HFA,VENTOLIN HFA) 90 mcg/act inhaler    albuterol (2 5 mg/3 mL) 0 083 % nebulizer solution          Immunizations and preventive care screenings were discussed with patient today  Appropriate education was printed on patient's after visit summary  Counseling:  Alcohol/drug use: discussed moderation in alcohol intake, the recommendations for healthy alcohol use, and avoidance of illicit drug use  Dental Health: discussed importance of regular tooth brushing, flossing, and dental visits  Injury prevention: discussed safety/seat belts, safety helmets, smoke detectors, carbon dioxide detectors, and smoking near bedding or upholstery  Sexual health: discussed sexually transmitted diseases, partner selection, use of condoms, avoidance of unintended pregnancy, and contraceptive alternatives  · Exercise: the importance of regular exercise/physical activity was discussed  Recommend exercise 3-5 times per week for at least 30 minutes       BMI Counseling: Body mass index is 45 73 kg/m²  The BMI is above normal  Nutrition recommendations include decreasing portion sizes, encouraging healthy choices of fruits and vegetables, decreasing fast food intake, consuming healthier snacks, limiting drinks that contain sugar, moderation in carbohydrate intake, increasing intake of lean protein, reducing intake of saturated and trans fat and reducing intake of cholesterol  Exercise recommendations include moderate physical activity 150 minutes/week  No pharmacotherapy was ordered  Rationale for BMI follow-up plan is due to patient being overweight or obese  Depression Screening and Follow-up Plan: Patient was screened for depression during today's encounter  They screened negative with a PHQ-2 score of 0  Return in about 1 year (around 4/7/2023) for Recheck  Chief Complaint:     Chief Complaint   Patient presents with    Rash     L forearm       History of Present Illness:     Adult Annual Physical   Patient here for a comprehensive physical exam  The patient reports no problems  Diet and Physical Activity  · Diet/Nutrition: well balanced diet, consuming 3-5 servings of fruits/vegetables daily and adequate fiber intake  · Exercise: moderate cardiovascular exercise, 3-4 times a week on average and 1-2 hours on average  Depression Screening  PHQ-2/9 Depression Screening    Little interest or pleasure in doing things: 0 - not at all  Feeling down, depressed, or hopeless: 0 - not at all  PHQ-2 Score: 0  PHQ-2 Interpretation: Negative depression screen       General Health  · Sleep: sleeps well and gets 7-8 hours of sleep on average  · Hearing: normal - bilateral   · Vision: most recent eye exam <1 year ago and wears glasses  · Dental: no dental visits for >1 year, brushes teeth twice daily and flosses teeth occasionally         /GYN Health  · Patient is: premenopausal  · Last menstrual period: 03/17/2022  · Contraceptive method: tubal ligation  Review of Systems:     Review of Systems   Constitutional: Negative  Negative for chills and fatigue  HENT: Negative  Respiratory: Negative  Negative for cough and shortness of breath  Cardiovascular: Negative  Negative for chest pain  Gastrointestinal: Negative  Genitourinary: Negative  Musculoskeletal: Negative  Negative for myalgias  Neurological: Negative  Past Medical History:     Past Medical History:   Diagnosis Date    Abnormal Pap smear of cervix     AMA (advanced maternal age) multigravida 35+     Anemia     Asthma     Diabetes mellitus ([address] )     gd    Disease of thyroid gland     Fibroid     Was told during last pregnancy had fibroid  Asymtomtic    Hypertension     pre ecl    Hypothyroidism     Kidney stone     Years ago   Had during last pregnancy    Varicella     Patient did at child      Past Surgical History:     Past Surgical History:   Procedure Laterality Date     SECTION  2009     SECTION  2012    CHOLECYSTECTOMY  2013    DENTAL SURGERY      [place] teeth extraction    ME  DELIVERY ONLY N/A 2020    Procedure:  SECTION () REPEAT;  Surgeon: Sarah Munoz MD;  [facility];  Service: Obstetrics    ME LIGATION,FALLOPIAN TUBE W/ Bilateral 2020    Procedure: LIGATION/COAGULATION TUBAL;  Surgeon: Sarah Munoz MD;  [facility];  Service: Obstetrics      Social History:     Social History     Socioeconomic History    Marital status: Single     Spouse name: Regis Vernon Number of children: 1    Years of education: None    Highest education level: None   Occupational History    Occupation: ER Registration     Employer: [employer]   Tobacco Use    Smoking status: Never Smoker    Smokeless tobacco: Never Used   Vaping Use    Vaping Use: Never used   Substance and Sexual Activity    Alcohol use: Yes     Comment: not currently    Drug use: No    Sexual activity: Yes     Partners: Male     Birth control/protection: Female Sterilization   Other Topics Concern    None   Social History Narrative    None     Social Determinants of Health     Financial Resource Strain: Not on file   Food Insecurity: Not on file   Transportation Needs: Not on file   Physical Activity: Not on file   Stress: Not on file   Social Connections: Not on file   Intimate Partner Violence: Not on file   Housing Stability: Not on file      Family History:     Family History   Problem Relation Age of Onset    COPD Mother     Heart disease Mother     Alcohol abuse Father     Depression Father     Drug abuse Father     Other Father         seizures, HIV    Depression Sister     Other Sister         seizures,  ptsd    Mental illness Sister         anx    Depression Brother     Mental illness Brother         anx, bipolar    Depression Daughter     Other Daughter         seizures    Mental illness Daughter         anx    Other Paternal Grandmother         hypothyroid    Cancer Maternal Aunt         colon, breast    Mental illness Son         add, adhd, anx    Other Maternal Grandmother         parkinsons    No Known Problems Maternal Grandfather     No Known Problems Paternal Grandfather     No Known Problems Half-Sister     No Known Problems Half-Sister     Diabetes Half-Brother         type 2    No Known Problems Daughter       Current Medications:     Current Outpatient Medications   Medication Sig Dispense Refill    albuterol (2 5 mg/3 mL) 0 083 % nebulizer solution Take 3 mL (2 5 mg total) by nebulization every 6 (six) hours as needed for wheezing or shortness of breath 25 mL 1    levothyroxine 75 mcg tablet Take 1 tablet (75 mcg total) by mouth daily 90 tablet 2    albuterol (PROVENTIL HFA,VENTOLIN HFA) 90 mcg/act inhaler Inhale 2 puffs every 4 (four) hours as needed for wheezing 8 g 2    mometasone (ELOCON) 0 1 % cream Apply topically daily 45 g 2     No current facility-administered medications for this visit  Allergies: Allergies   Allergen Reactions    Other Allergic Rhinitis     Seasonal  Grass      Physical Exam:     /80   Pulse (!) 108   Resp 16   Ht 5' 2" (1 575 m)   Wt 113 kg (250 lb)   BMI 45 73 kg/m²     Physical Exam  Vitals and nursing note reviewed  Constitutional:       General: She is not in acute distress  Appearance: Normal appearance  She is well-developed  She is not ill-appearing  HENT:      Head: Normocephalic and atraumatic  Eyes:      Conjunctiva/sclera: Conjunctivae normal    Neck:      Vascular: No carotid bruit  Cardiovascular:      Rate and Rhythm: Normal rate and regular rhythm  Pulses: Normal pulses  Heart sounds: Normal heart sounds  No murmur heard  Pulmonary:      Effort: Pulmonary effort is normal  No respiratory distress  Breath sounds: Normal breath sounds  No wheezing  Abdominal:      General: There is no distension  Palpations: Abdomen is soft  There is no mass  Tenderness: There is no abdominal tenderness  There is no guarding or rebound  Hernia: No hernia is present  Musculoskeletal:         General: Normal range of motion  Cervical back: Normal range of motion and neck supple  Right lower leg: No edema  Left lower leg: No edema  Skin:     General: Skin is warm and dry  Capillary Refill: Capillary refill takes less than 2 seconds  Neurological:      Mental Status: She is alert and oriented to person, place, and time  Mental status is at baseline  Motor: No weakness  Gait: Gait normal    Psychiatric:         Mood and Affect: Mood normal          Behavior: Behavior normal          Thought Content:  Thought content normal          Judgment: Judgment normal           JACEK Mullins  [address]

## 2022-04-07 NOTE — PATIENT INSTRUCTIONS

## 2022-04-12 ENCOUNTER — TELEPHONE (OUTPATIENT)
Dept: HEMATOLOGY ONCOLOGY | Facility: CLINIC | Age: 42
End: 2022-04-12

## 2022-06-22 ENCOUNTER — TELEPHONE (OUTPATIENT)
Dept: HEMATOLOGY ONCOLOGY | Facility: CLINIC | Age: 42
End: 2022-06-22

## 2022-08-23 DIAGNOSIS — Z11.1 SCREENING-PULMONARY TB: Primary | ICD-10-CM

## 2022-08-24 ENCOUNTER — APPOINTMENT (OUTPATIENT)
Dept: LAB | Facility: CLINIC | Age: 42
End: 2022-08-24
Payer: COMMERCIAL

## 2022-08-24 DIAGNOSIS — R53.83 FATIGUE, UNSPECIFIED TYPE: ICD-10-CM

## 2022-08-24 DIAGNOSIS — E03.9 HYPOTHYROIDISM, UNSPECIFIED TYPE: ICD-10-CM

## 2022-08-24 DIAGNOSIS — Z11.1 SCREENING-PULMONARY TB: ICD-10-CM

## 2022-08-24 DIAGNOSIS — Z86.2 HISTORY OF IRON DEFICIENCY ANEMIA: ICD-10-CM

## 2022-08-24 LAB
ALBUMIN SERPL BCP-MCNC: 3.2 G/DL (ref 3.5–5)
ALP SERPL-CCNC: 98 U/L (ref 46–116)
ALT SERPL W P-5'-P-CCNC: 23 U/L (ref 12–78)
ANION GAP SERPL CALCULATED.3IONS-SCNC: 4 MMOL/L (ref 4–13)
AST SERPL W P-5'-P-CCNC: 13 U/L (ref 5–45)
BASOPHILS # BLD AUTO: 0.03 THOUSANDS/ΜL (ref 0–0.1)
BASOPHILS NFR BLD AUTO: 0 % (ref 0–1)
BILIRUB SERPL-MCNC: 0.15 MG/DL (ref 0.2–1)
BUN SERPL-MCNC: 12 MG/DL (ref 5–25)
CALCIUM ALBUM COR SERPL-MCNC: 10.1 MG/DL (ref 8.3–10.1)
CALCIUM SERPL-MCNC: 9.5 MG/DL (ref 8.3–10.1)
CHLORIDE SERPL-SCNC: 108 MMOL/L (ref 96–108)
CHOLEST SERPL-MCNC: 210 MG/DL
CO2 SERPL-SCNC: 25 MMOL/L (ref 21–32)
CREAT SERPL-MCNC: 0.84 MG/DL (ref 0.6–1.3)
EOSINOPHIL # BLD AUTO: 0.56 THOUSAND/ΜL (ref 0–0.61)
EOSINOPHIL NFR BLD AUTO: 6 % (ref 0–6)
ERYTHROCYTE [DISTWIDTH] IN BLOOD BY AUTOMATED COUNT: 17.6 % (ref 11.6–15.1)
FERRITIN SERPL-MCNC: 28 NG/ML (ref 8–388)
GFR SERPL CREATININE-BSD FRML MDRD: 86 ML/MIN/1.73SQ M
GLUCOSE P FAST SERPL-MCNC: 112 MG/DL (ref 65–99)
HCT VFR BLD AUTO: 38 % (ref 34.8–46.1)
HDLC SERPL-MCNC: 52 MG/DL
HGB BLD-MCNC: 11 G/DL (ref 11.5–15.4)
IMM GRANULOCYTES # BLD AUTO: 0.03 THOUSAND/UL (ref 0–0.2)
IMM GRANULOCYTES NFR BLD AUTO: 0 % (ref 0–2)
IRON SATN MFR SERPL: 5 % (ref 15–50)
IRON SERPL-MCNC: 17 UG/DL (ref 50–170)
LDLC SERPL CALC-MCNC: 134 MG/DL (ref 0–100)
LYMPHOCYTES # BLD AUTO: 2.17 THOUSANDS/ΜL (ref 0.6–4.47)
LYMPHOCYTES NFR BLD AUTO: 23 % (ref 14–44)
MCH RBC QN AUTO: 22.3 PG (ref 26.8–34.3)
MCHC RBC AUTO-ENTMCNC: 28.9 G/DL (ref 31.4–37.4)
MCV RBC AUTO: 77 FL (ref 82–98)
MONOCYTES # BLD AUTO: 0.67 THOUSAND/ΜL (ref 0.17–1.22)
MONOCYTES NFR BLD AUTO: 7 % (ref 4–12)
NEUTROPHILS # BLD AUTO: 5.96 THOUSANDS/ΜL (ref 1.85–7.62)
NEUTS SEG NFR BLD AUTO: 64 % (ref 43–75)
NRBC BLD AUTO-RTO: 0 /100 WBCS
PLATELET # BLD AUTO: 334 THOUSANDS/UL (ref 149–390)
PMV BLD AUTO: 10.6 FL (ref 8.9–12.7)
POTASSIUM SERPL-SCNC: 4.4 MMOL/L (ref 3.5–5.3)
PROT SERPL-MCNC: 7.9 G/DL (ref 6.4–8.4)
RBC # BLD AUTO: 4.94 MILLION/UL (ref 3.81–5.12)
SODIUM SERPL-SCNC: 137 MMOL/L (ref 135–147)
TIBC SERPL-MCNC: 334 UG/DL (ref 250–450)
TRIGL SERPL-MCNC: 122 MG/DL
TSH SERPL DL<=0.05 MIU/L-ACNC: 2.23 UIU/ML (ref 0.45–4.5)
WBC # BLD AUTO: 9.42 THOUSAND/UL (ref 4.31–10.16)

## 2022-08-24 PROCEDURE — 82728 ASSAY OF FERRITIN: CPT

## 2022-08-24 PROCEDURE — 85025 COMPLETE CBC W/AUTO DIFF WBC: CPT

## 2022-08-24 PROCEDURE — 83550 IRON BINDING TEST: CPT

## 2022-08-24 PROCEDURE — 83540 ASSAY OF IRON: CPT

## 2022-08-24 PROCEDURE — 80053 COMPREHEN METABOLIC PANEL: CPT

## 2022-08-24 PROCEDURE — 84443 ASSAY THYROID STIM HORMONE: CPT

## 2022-08-24 PROCEDURE — 86480 TB TEST CELL IMMUN MEASURE: CPT

## 2022-08-24 PROCEDURE — 36415 COLL VENOUS BLD VENIPUNCTURE: CPT

## 2022-08-24 PROCEDURE — 80061 LIPID PANEL: CPT

## 2022-08-25 LAB
GAMMA INTERFERON BACKGROUND BLD IA-ACNC: 0.11 IU/ML
M TB IFN-G BLD-IMP: NEGATIVE
M TB IFN-G CD4+ BCKGRND COR BLD-ACNC: -0.01 IU/ML
M TB IFN-G CD4+ BCKGRND COR BLD-ACNC: -0.03 IU/ML
MITOGEN IGNF BCKGRD COR BLD-ACNC: >10 IU/ML

## 2022-08-29 ENCOUNTER — OFFICE VISIT (OUTPATIENT)
Dept: FAMILY MEDICINE CLINIC | Facility: CLINIC | Age: 42
End: 2022-08-29
Payer: COMMERCIAL

## 2022-08-29 DIAGNOSIS — L40.9 PSORIASIS: ICD-10-CM

## 2022-08-29 DIAGNOSIS — J45.20 MILD INTERMITTENT ASTHMA WITHOUT COMPLICATION: ICD-10-CM

## 2022-08-29 DIAGNOSIS — E03.9 HYPOTHYROIDISM, UNSPECIFIED TYPE: Primary | ICD-10-CM

## 2022-08-29 DIAGNOSIS — E78.5 HYPERLIPIDEMIA, UNSPECIFIED HYPERLIPIDEMIA TYPE: ICD-10-CM

## 2022-08-29 DIAGNOSIS — D50.9 IRON DEFICIENCY ANEMIA, UNSPECIFIED IRON DEFICIENCY ANEMIA TYPE: ICD-10-CM

## 2022-08-29 DIAGNOSIS — N93.9 ABNORMAL VAGINAL BLEEDING: ICD-10-CM

## 2022-08-29 DIAGNOSIS — R73.01 ELEVATED FASTING GLUCOSE: ICD-10-CM

## 2022-08-29 PROCEDURE — 99214 OFFICE O/P EST MOD 30 MIN: CPT | Performed by: FAMILY MEDICINE

## 2022-08-29 RX ORDER — BETAMETHASONE DIPROPIONATE 0.5 MG/G
CREAM TOPICAL 2 TIMES DAILY
Qty: 45 G | Refills: 0 | Status: SHIPPED | OUTPATIENT
Start: 2022-08-29

## 2022-08-29 RX ORDER — ALBUTEROL SULFATE 90 UG/1
2 AEROSOL, METERED RESPIRATORY (INHALATION) EVERY 4 HOURS PRN
Qty: 8 G | Refills: 2 | Status: SHIPPED | OUTPATIENT
Start: 2022-08-29

## 2022-08-31 VITALS
BODY MASS INDEX: 46.56 KG/M2 | HEIGHT: 62 IN | HEART RATE: 104 BPM | RESPIRATION RATE: 16 BRPM | DIASTOLIC BLOOD PRESSURE: 88 MMHG | WEIGHT: 253 LBS | SYSTOLIC BLOOD PRESSURE: 130 MMHG

## 2022-08-31 NOTE — PROGRESS NOTES
Assessment/Plan:    1  Hypothyroidism, unspecified type  - continue levothyroxine 75 mcg daily  - will recheck TFT's prior to next visit  - TSH, 3rd generation with Free T4 reflex; Future    2  Psoriasis  - start betamethasone cream  - discussed Dermatology evaluation of not better  - betamethasone dipropionate (DIPROSONE) 0 05 % cream; Apply topically 2 (two) times a day  Dispense: 45 g; Refill: 0    3  Hyperlipidemia  - , improved from 177, continue working on healthy diet and regular exercise, will repeat lipids prior to next visit  - Lipid Panel with Direct LDL reflex; Future    4  Elevated fasting glucose  - A1c 5 7, advised to limit sweets such as sodas, fruit juices, desserts, candy and carbohydrates such as bread, rice, pasta, potatoes; will recheck lab work prior to next visit  - Hemoglobin A1C; Future  - Basic metabolic panel; Future    5  Mild intermittent asthma without complication  - continue Albuterol as needed, uses rarely  - albuterol (PROVENTIL HFA,VENTOLIN HFA) 90 mcg/act inhaler; Inhale 2 puffs every 4 (four) hours as needed for wheezing or shortness of breath  Dispense: 8 g; Refill: 2    6  Iron deficiency anemia  - hemoglobin 11 0, stable, follow up with Hematology as scheduled as scheduled on 9/13/22    7  Abnormal vaginal bleeding  - follow up with Gyn       Return in 04/2023 for annual physical or sooner as needed  The patient understands and agrees with the treatment plan  Subjective:   Chief Complaint   Patient presents with    Rash     L arm     Hypothyroidism      Patient ID: Sofya Blair is a 43 y o  female who presents for follow up hypothyroidism, hyperlipidemia and review her lab results  Patient reports a scaly rash on her left outer elbow/ proximal forearm for the past few months, she has been using topical mometasone cream without significant improvement  She reports family hx of psoriasis  Patient also reports heavy menstrual periods for the past several cycles  She offers no other complaints, she has been working on [employer], but is not physically active  The following portions of the patient's history were reviewed and updated as appropriate: allergies, current medications, past family history, past medical history, past social history, past surgical history and problem list     Past Medical History:   Diagnosis Date    Abnormal Pap smear of cervix     AMA (advanced maternal age) multigravida 35+     Anemia     Asthma     Diabetes mellitus ([facility] [address]     gd    Disease of thyroid gland     Fibroid     Was told during last pregnancy had fibroid  Asymtomtic    Hypertension     pre ecl    Hypothyroidism     Kidney stone     Years ago   Had during last pregnancy    Varicella     Patient did at child     Past Surgical History:   Procedure Laterality Date     SECTION  2009     SECTION  2012    CHOLECYSTECTOMY  2013    DENTAL SURGERY      [place] teeth extraction    ID  DELIVERY ONLY N/A 2020    Procedure:  SECTION () REPEAT;  Surgeon: Bernardo Henson MD;  [facility];  Service: Obstetrics    ID LIGATION,FALLOPIAN TUBE W/ Bilateral 2020    Procedure: LIGATION/COAGULATION TUBAL;  Surgeon: Bernardo Henson MD;  [facility];  Service: Obstetrics     Family History   Problem Relation Age of Onset    COPD Mother     Heart disease Mother     Alcohol abuse Father     Depression Father     Drug abuse Father     Other Father         seizures, HIV    Depression Sister     Other Sister         seizures,  ptsd    Mental illness Sister         anx    Depression Brother     Mental illness Brother         anx, bipolar    Depression Daughter     Other Daughter         seizures    Mental illness Daughter         anx    Other Paternal Grandmother         hypothyroid    Cancer Maternal Aunt         colon, breast    Mental illness Son         add, adhd, anx    Other Maternal Grandmother         parkinsons    No Known Problems Maternal Grandfather     No Known Problems Paternal Grandfather     No Known Problems Half-Sister     No Known Problems Half-Sister     Diabetes Half-Brother         type 2    No Known Problems Daughter      Social History     Socioeconomic History    Marital status: Single     Spouse name: Hardik Del Rio Number of children: 1    Years of education: Not on file    Highest education level: Not on file   Occupational History    Occupation: ER Registration     Employer: [employer]   Tobacco Use    Smoking status: Never Smoker    Smokeless tobacco: Never Used   Vaping Use    Vaping Use: Never used   Substance and Sexual Activity    Alcohol use: Yes     Comment: not currently    Drug use: No    Sexual activity: Yes     Partners: Male     Birth control/protection: Female Sterilization   Other Topics Concern    Not on file   Social History Narrative    Not on file     Social Determinants of Health     Financial Resource Strain: Not on file   Food Insecurity: Not on file   Transportation Needs: Not on file   Physical Activity: Not on file   Stress: Not on file   Social Connections: Not on file   Intimate Partner Violence: Not on file   Housing Stability: Not on file       Current Outpatient Medications:     albuterol (2 5 mg/3 mL) 0 083 % nebulizer solution, Take 3 mL (2 5 mg total) by nebulization every 6 (six) hours as needed for wheezing or shortness of breath, Disp: 25 mL, Rfl: 1    albuterol (PROVENTIL HFA,VENTOLIN HFA) 90 mcg/act inhaler, Inhale 2 puffs every 4 (four) hours as needed for wheezing or shortness of breath, Disp: 8 g, Rfl: 2    betamethasone dipropionate (DIPROSONE) 0 05 % cream, Apply topically 2 (two) times a day, Disp: 45 g, Rfl: 0    levothyroxine 75 mcg tablet, Take 1 tablet (75 mcg total) by mouth daily, Disp: 90 tablet, Rfl: 2    mometasone (ELOCON) 0 1 % cream, Apply topically daily, Disp: 45 g, Rfl: 2    Review of Systems   Constitutional: Negative for appetite change, chills, fatigue, fever and unexpected weight change  Respiratory: Negative for cough, shortness of breath and wheezing  Cardiovascular: Negative for chest pain, palpitations and leg swelling  Gastrointestinal: Negative for abdominal pain, blood in stool, constipation, diarrhea, nausea and vomiting  Genitourinary: Positive for menstrual problem  Negative for difficulty urinating, dysuria, flank pain, frequency, hematuria, pelvic pain and urgency  Musculoskeletal: Negative for arthralgias, joint swelling and myalgias  Skin: Positive for rash  Neurological: Negative for dizziness, syncope, weakness, numbness and headaches  Hematological: Negative for adenopathy  Psychiatric/Behavioral: Negative for dysphoric mood and sleep disturbance  The patient is not nervous/anxious  Objective:    Vitals:    08/29/22 1501   BP: 130/88   Pulse: 104   Resp: 16   Weight: 117 kg (258 lb)   Height: 5' 2" (1 575 m)     Wt Readings from Last 3 Encounters:   08/29/22 117 kg (258 lb)   04/07/22 113 kg (250 lb)   10/27/20 123 kg (271 lb 4 8 oz)     BP Readings from Last 3 Encounters:   08/29/22 130/88   04/07/22 122/80   10/27/20 138/86        Physical Exam  Constitutional:       General: She is not in acute distress  Appearance: She is well-developed  Neck:      Thyroid: No thyromegaly  Cardiovascular:      Rate and Rhythm: Normal rate and regular rhythm  Heart sounds: Normal heart sounds  No murmur heard  Pulmonary:      Effort: Pulmonary effort is normal  No respiratory distress  Breath sounds: Normal breath sounds  No wheezing, rhonchi or rales  Abdominal:      General: There is no distension  Palpations: Abdomen is soft  There is no mass  Tenderness: There is no abdominal tenderness  Musculoskeletal:      Cervical back: Neck supple  Right lower leg: No edema  Left lower leg: No edema  Lymphadenopathy:      Cervical: No cervical adenopathy  Neurological:      Mental Status: She is alert and oriented to person, place, and time  Psychiatric:         Mood and Affect: Mood normal          Behavior: Behavior normal          Thought Content:  Thought content normal           Lab Results   Component Value Date    [id number] 2 230 08/24/2022     Lab Results   Component Value Date    WBC 9 42 08/24/2022    HGB 11 0 (L) 08/24/2022    HCT 38 0 08/24/2022    MCV 77 (L) 08/24/2022     08/24/2022     Lab Results   Component Value Date    SODIUM 137 08/24/2022    K 4 4 08/24/2022     08/24/2022    CO2 25 08/24/2022    AGAP 4 08/24/2022    BUN 12 08/24/2022    CREATININE 0 84 08/24/2022    GLUC 99 09/07/2020    GLUF 112 (H) 08/24/2022    CALCIUM 9 5 08/24/2022    AST 13 08/24/2022    ALT 23 08/24/2022    ALKPHOS 98 08/24/2022    TP 7 9 08/24/2022    TBILI 0 15 (L) 08/24/2022    EGFR 86 08/24/2022     Lab Results   Component Value Date    HGBA1C 5 7 08/27/2018     Lab Results   Component Value Date    CHOLESTEROL 210 (H) 08/24/2022    CHOLESTEROL 261 (H) 07/16/2019    CHOLESTEROL 190 08/27/2018     Lab Results   Component Value Date    HDL 52 08/24/2022    HDL 66 (H) 07/16/2019    HDL 36 (L) 08/27/2018     Lab Results   Component Value Date    TRIG 122 08/24/2022    TRIG 91 07/16/2019    TRIG 136 08/27/2018     Lab Results   Component Value Date    LDLCALC 134 (H) 08/24/2022     Lab Results   Component Value Date    IRON 17 (L) 08/24/2022    TIBC 334 08/24/2022    FERRITIN 28 08/24/2022

## 2022-09-14 ENCOUNTER — APPOINTMENT (OUTPATIENT)
Dept: LAB | Facility: CLINIC | Age: 42
End: 2022-09-14
Payer: COMMERCIAL

## 2022-09-14 ENCOUNTER — TELEPHONE (OUTPATIENT)
Dept: HEMATOLOGY ONCOLOGY | Facility: CLINIC | Age: 42
End: 2022-09-14

## 2022-09-14 ENCOUNTER — CONSULT (OUTPATIENT)
Dept: HEMATOLOGY ONCOLOGY | Facility: CLINIC | Age: 42
End: 2022-09-14
Payer: COMMERCIAL

## 2022-09-14 VITALS
RESPIRATION RATE: 16 BRPM | OXYGEN SATURATION: 99 % | BODY MASS INDEX: 47.66 KG/M2 | SYSTOLIC BLOOD PRESSURE: 128 MMHG | HEIGHT: 62 IN | DIASTOLIC BLOOD PRESSURE: 88 MMHG | TEMPERATURE: 97.5 F | WEIGHT: 259 LBS | HEART RATE: 91 BPM

## 2022-09-14 DIAGNOSIS — D51.9 ANEMIA DUE TO VITAMIN B12 DEFICIENCY, UNSPECIFIED B12 DEFICIENCY TYPE: Primary | ICD-10-CM

## 2022-09-14 DIAGNOSIS — Z86.2 HISTORY OF IRON DEFICIENCY ANEMIA: ICD-10-CM

## 2022-09-14 DIAGNOSIS — D51.9 ANEMIA DUE TO VITAMIN B12 DEFICIENCY, UNSPECIFIED B12 DEFICIENCY TYPE: ICD-10-CM

## 2022-09-14 DIAGNOSIS — N92.0 MENORRHAGIA WITH REGULAR CYCLE: ICD-10-CM

## 2022-09-14 LAB — VIT B12 SERPL-MCNC: 415 PG/ML (ref 100–900)

## 2022-09-14 PROCEDURE — 99204 OFFICE O/P NEW MOD 45 MIN: CPT | Performed by: NURSE PRACTITIONER

## 2022-09-14 PROCEDURE — 82607 VITAMIN B-12: CPT

## 2022-09-14 PROCEDURE — 36415 COLL VENOUS BLD VENIPUNCTURE: CPT

## 2022-09-14 RX ORDER — SODIUM CHLORIDE 9 MG/ML
20 INJECTION, SOLUTION INTRAVENOUS ONCE
Status: CANCELLED | OUTPATIENT
Start: 2022-09-23

## 2022-09-14 NOTE — PROGRESS NOTES
[id number] [facility] [address] [address] [phone]  Hematology Ambulatory Consult  Aronellis Thomas, 1980, [id number]  9/14/2022      Assessment and Plan   1  History of iron deficiency anemia  2  Anemia due to vitamin B12 deficiency, unspecified B12 deficiency type  3  Menorrhagia with Regular cycle   Patient is a 66-year-old female with a history of hypothyroid, psoriasis, hyperlipidemia, and iron deficiency anemia likely secondary to menorrhagia  She has had issues with anemia throughout her life  Most recently hemoglobin has consistently been between 8 7-11 1 since at least 2020  Iron saturation 5% and ferritin level 28  She is unable to tolerate oral iron as it causes diarrhea  She was referred to our office for further management  CBC is otherwise within normal limits  Patient does not have a history of gastric bypass and follows a iron rich diet with a variety of foods  She does have menorrhagia with periods lasting 5-7 days four of the days heavy  She denies blood in her stools  Patient is scheduled for a mammogram in the  Patient has been following with Gynecology for abnormal uterine bleeding  She has been on  Norethindrone in the past, however has not seen Gynecology since 2020  She is encouraged to follow-up with them in the near future  We discussed initiation of intravenous iron Venofer 300 mg IV x3  I will check a vitamin B12 level as patient states it was low in the past   Most recent panel is from 2019 that was normal at 540  If vitamin B12 is low we will add an injection to the plan  I reviewed indications and adverse reactions including chest heaviness, shortness of breath, muscle cramping, headache and itching  I advised her to contact our office if she has any of these symptoms we will add premedications to the next infusion  I will request a 3 month interval labs managing as indicated    We will plan to see her in 6 months, patient verbalized understanding and is in agreement the plan  - Ambulatory Referral to Hematology / Oncology  - CBC; Standing  - Comprehensive metabolic panel; Standing  - Iron Panel (Includes Ferritin, Iron Sat%, Iron, and TIBC); Standing  - Vitamin B12; Future  - CBC  - Comprehensive metabolic panel  - Iron Panel (Includes Ferritin, Iron Sat%, Iron, and TIBC)    Barrier(s) to care: None  The patient is  able to self care  [facility] [address] [place] [address]    Subjective     Chief Complaint   Patient presents with   Shruthi Schumacher Consult       Referring provider    Perla Adams, [address] [facility] [address],  [address] [place] [address]    History of present illness:  Patient is a 55-year-old female with a history of hypothyroid, psoriasis, hyperlipidemia, and iron deficiency anemia likely secondary to menorrhagia  She has had issues with anemia throughout her life  Most recently hemoglobin has consistently been between 8 7-11 1 since at least 2020  Iron saturation 5% and ferritin level 28  She is unable to tolerate oral iron as it causes diarrhea  She was referred to our office for further management  09/14/22: clinically stable    Review of Systems   Constitutional: Positive for fatigue  Negative for activity change, appetite change, fever and unexpected weight change  Respiratory: Negative for cough and shortness of breath  Cardiovascular: Negative for chest pain and leg swelling  Gastrointestinal: Negative for abdominal pain, constipation, diarrhea and nausea  Endocrine: Negative for cold intolerance and heat intolerance  Musculoskeletal: Positive for myalgias  Negative for arthralgias  Restless legs   Skin: Negative  Neurological: Positive for headaches  Negative for dizziness and weakness  Hematological: Negative for adenopathy  Does not bruise/bleed easily       Past Medical History: Diagnosis Date    Abnormal Pap smear of cervix     AMA (advanced maternal age) multigravida 35+     Anemia     Asthma     Diabetes mellitus ([facility] [address] )     gd    Disease of thyroid gland     Fibroid     Was told during last pregnancy had fibroid  Asymtomtic    Hypertension     pre ecl    Hypothyroidism     Kidney stone     Years ago   Had during last pregnancy    Varicella     Patient did at child     Past Surgical History:   Procedure Laterality Date     SECTION  2009     SECTION  2012    CHOLECYSTECTOMY  2013    DENTAL SURGERY      [place] teeth extraction    MI  DELIVERY ONLY N/A 2020    Procedure:  SECTION () REPEAT;  Surgeon: Stalin Bolton MD;  [facility];  Service: Obstetrics    MI LIGATION,FALLOPIAN TUBE W/ Bilateral 2020    Procedure: LIGATION/COAGULATION TUBAL;  Surgeon: Stalin Bolton MD;  [facility];  Service: Obstetrics     Family History   Problem Relation Age of Onset    COPD Mother     Heart disease Mother     Alcohol abuse Father     Depression Father     Drug abuse Father     Other Father         seizures, HIV    Depression Sister     Other Sister         seizures,  ptsd    Mental illness Sister         anx    Depression Brother     Mental illness Brother         anx, bipolar    Depression Daughter     Other Daughter         seizures    Mental illness Daughter         anx    Other Paternal Grandmother         hypothyroid    Cancer Maternal Aunt         colon, breast    Mental illness Son         add, adhd, anx    Other Maternal Grandmother         parkinsons    No Known Problems Maternal Grandfather     No Known Problems Paternal Grandfather     No Known Problems Half-Sister     No Known Problems Half-Sister     Diabetes Half-Brother         type 2    No Known Problems Daughter      Social History     Socioeconomic History    Marital status: Single     Spouse name: Kristen Murphy  Number of children: 1    Years of education: None    Highest education level: None   Occupational History    Occupation: ER Registration     Employer: [employer]   Tobacco Use    Smoking status: Never Smoker    Smokeless tobacco: Never Used   Vaping Use    Vaping Use: Never used   Substance and Sexual Activity    Alcohol use: Yes     Comment: not currently    Drug use: No    Sexual activity: Yes     Partners: Male     Birth control/protection: Female Sterilization   Other Topics Concern    None   Social History Narrative    None     Social Determinants of Health     Financial Resource Strain: Not on file   Food Insecurity: Not on file   Transportation Needs: Not on file   Physical Activity: Not on file   Stress: Not on file   Social Connections: Not on file   Intimate Partner Violence: Not on file   Housing Stability: Not on file       Current Outpatient Medications:     albuterol (2 5 mg/3 mL) 0 083 % nebulizer solution, Take 3 mL (2 5 mg total) by nebulization every 6 (six) hours as needed for wheezing or shortness of breath, Disp: 25 mL, Rfl: 1    albuterol (PROVENTIL HFA,VENTOLIN HFA) 90 mcg/act inhaler, Inhale 2 puffs every 4 (four) hours as needed for wheezing or shortness of breath, Disp: 8 g, Rfl: 2    betamethasone dipropionate (DIPROSONE) 0 05 % cream, Apply topically 2 (two) times a day, Disp: 45 g, Rfl: 0    levothyroxine 75 mcg tablet, Take 1 tablet (75 mcg total) by mouth daily, Disp: 90 tablet, Rfl: 2    mometasone (ELOCON) 0 1 % cream, Apply topically daily, Disp: 45 g, Rfl: 2  Allergies   Allergen Reactions    Other Allergic Rhinitis     Seasonal  Grass     Objective   /88 (BP Location: Left arm, Patient Position: Sitting, Cuff Size: Large)   Pulse 91   Temp 97 5 °F (36 4 °C) (Temporal)   Resp 16   Ht 5' 2" (1 575 m)   Wt 117 kg (259 lb)   SpO2 99%   BMI 47 37 kg/m²   Physical Exam  Constitutional:       Appearance: Normal appearance   She is well-developed  She is obese  HENT:      Head: Normocephalic and atraumatic  Eyes:      Conjunctiva/sclera: Conjunctivae normal       Pupils: Pupils are equal, round, and reactive to light  Cardiovascular:      Rate and Rhythm: Normal rate and regular rhythm  Pulses: Normal pulses  Heart sounds: Normal heart sounds  No murmur heard  Pulmonary:      Effort: Pulmonary effort is normal  No respiratory distress  Breath sounds: Normal breath sounds  Abdominal:      General: Bowel sounds are normal       Palpations: Abdomen is soft  Musculoskeletal:         General: Normal range of motion  Cervical back: Normal range of motion and neck supple  Lymphadenopathy:      Cervical: No cervical adenopathy  Skin:     General: Skin is warm and dry  Capillary Refill: Capillary refill takes less than 2 seconds  Neurological:      Mental Status: She is alert and oriented to person, place, and time  Psychiatric:         Behavior: Behavior normal      Result Review  Labs:  Appointment on 08/24/2022   Component Date Value Ref Range Status    Cholesterol 08/24/2022 210 (A) See Comment mg/dL Final    Cholesterol:         Pediatric <18 Years        Desirable          <170 mg/dL      Borderline High    170-199 mg/dL      High               >=200 mg/dL        Adult >=18 Years            Desirable         <200 mg/dL      Borderline High   200-239 mg/dL      High              >239 mg/dL      Triglycerides 08/24/2022 122  See Comment mg/dL Final    Triglyceride:     0-9Y            <75mg/dL     10Y-17Y         <90 mg/dL       >=18Y     Normal          <150 mg/dL     Borderline High 150-199 mg/dL     High            200-499 mg/dL        Very High       >499 mg/dL    Specimen collection should occur prior to N-Acetylcysteine or Metamizole administration due to the potential for falsely depressed results      HDL, Direct 08/24/2022 52  >=50 mg/dL Final    LDL Calculated 08/24/2022 134 (A) 0 - 100 mg/dL Final    This screening LDL is a calculated result  It does not have the accuracy of the Direct Measured LDL in the monitoring of patients with hyperlipidemia and/or statin therapy  Direct Measure LDL (JBC511) must be ordered separately in these patients  LDL Cholesterol:     Optimal           <100 mg/dl     Near Optimal      100-129 mg/dl     Above Optimal       Borderline High 130-159 mg/dl       High            160-189 mg/dl       Very High       >189 mg/dl           Sodium 08/24/2022 137  135 - 147 mmol/L Final    Potassium 08/24/2022 4 4  3 5 - 5 3 mmol/L Final    Chloride 08/24/2022 108  96 - 108 mmol/L Final    CO2 08/24/2022 25  21 - 32 mmol/L Final    ANION GAP 08/24/2022 4  4 - 13 mmol/L Final    BUN 08/24/2022 12  5 - 25 mg/dL Final    Creatinine 08/24/2022 0 84  0 60 - 1 30 mg/dL Final    Standardized to IDMS reference method    Glucose, Fasting 08/24/2022 112 (A) 65 - 99 mg/dL Final    Specimen collection should occur prior to Sulfasalazine administration due to the potential for falsely depressed results  Specimen collection should occur prior to Sulfapyridine administration due to the potential for falsely elevated results   Calcium 08/24/2022 9 5  8 3 - 10 1 mg/dL Final    Corrected Calcium 08/24/2022 10 1  8 3 - 10 1 mg/dL Final    AST 08/24/2022 13  5 - 45 U/L Final    Specimen collection should occur prior to Sulfasalazine administration due to the potential for falsely depressed results   ALT 08/24/2022 23  12 - 78 U/L Final    Specimen collection should occur prior to Sulfasalazine and/or Sulfapyridine administration due to the potential for falsely depressed results       Alkaline Phosphatase 08/24/2022 98  46 - 116 U/L Final    Total Protein 08/24/2022 7 9  6 4 - 8 4 g/dL Final    Albumin 08/24/2022 3 2 (A) 3 5 - 5 0 g/dL Final    Total Bilirubin 08/24/2022 0 15 (A) 0 20 - 1 00 mg/dL Final    Use of this assay is not recommended for patients undergoing treatment with eltrombopag due to the potential for falsely elevated results   eGFR 08/24/2022 86  ml/min/1 73sq m Final    WBC 08/24/2022 9 42  4 31 - 10 16 Thousand/uL Final    RBC 08/24/2022 4 94  3 81 - 5 12 Million/uL Final    Hemoglobin 08/24/2022 11 0 (A) 11 5 - 15 4 g/dL Final    Hematocrit 08/24/2022 38 0  34 8 - 46 1 % Final    MCV 08/24/2022 77 (A) 82 - 98 fL Final    MCH 08/24/2022 22 3 (A) 26 8 - 34 3 pg Final    MCHC 08/24/2022 28 9 (A) 31 4 - 37 4 g/dL Final    RDW 08/24/2022 17 6 (A) 11 6 - 15 1 % Final    MPV 08/24/2022 10 6  8 9 - 12 7 fL Final    Platelets 91/17/8511 334  149 - 390 Thousands/uL Final    nRBC 08/24/2022 0  /100 WBCs Final    Neutrophils Relative 08/24/2022 64  43 - 75 % Final    Immat GRANS % 08/24/2022 0  0 - 2 % Final    Lymphocytes Relative 08/24/2022 23  14 - 44 % Final    Monocytes Relative 08/24/2022 7  4 - 12 % Final    Eosinophils Relative 08/24/2022 6  0 - 6 % Final    Basophils Relative 08/24/2022 0  0 - 1 % Final    Neutrophils Absolute 08/24/2022 5 96  1 85 - 7 62 Thousands/µL Final    Immature Grans Absolute 08/24/2022 0 03  0 00 - 0 20 Thousand/uL Final    Lymphocytes Absolute 08/24/2022 2 17  0 60 - 4 47 Thousands/µL Final    Monocytes Absolute 08/24/2022 0 67  0 17 - 1 22 Thousand/µL Final    Eosinophils Absolute 08/24/2022 0 56  0 00 - 0 61 Thousand/µL Final    Basophils Absolute 08/24/2022 0 03  0 00 - 0 10 Thousands/µL Final    TSH 3RD GENERATON 08/24/2022 2 230  0 450 - 4 500 uIU/mL Final    The recommended reference ranges for TSH during pregnancy are as follows:   First trimester 0 1 to 2 5 uIU/mL   Second trimester  0 2 to 3 0 uIU/mL   Third trimester 0 3 to 3 0 uIU/m    Note: Normal ranges may not apply to patients who are transgender, non-binary, or whose legal sex, sex at birth, and gender identity differ  Adult TSH (3rd generation) reference range follows the recommended guidelines of the American Thyroid Association, January, 2020      QFT Nil 08/24/2022 0 11  0 - 8 0 IU/ml Final    QFT TB1-NIL 08/24/2022 -0 03  IU/ml Final    QFT TB2-NIL 08/24/2022 -0 01  IU/ml Final    QFT Mitogen-NIL 08/24/2022 >10 00  IU/ml Final    QFT Final Interpretation 08/24/2022 Negative  Negative Final    No Interferon-gamma response to M  tuberculosis antigens detected  Infection with M  tuberculosis is unlikely  A single negative result does not exclude infection with M  tuberculosis  In patients at high risk for M  tuberculosis infection, a second test should be considered in accordance with the 2017 ATS/IDSA/CDC Clinical Practice Guidelines for Diagnosis of Tuberculosis in Adults and Children  False negative results can be a result of incorrect blood sample collection or handling of the specimen affecting lymphocyte function   Iron Saturation 08/24/2022 5 (A) 15 - 50 % Final    TIBC 08/24/2022 334  250 - 450 ug/dL Final    Iron 08/24/2022 17 (A) 50 - 170 ug/dL Final    Patients treated with metal-binding drugs (ie  Deferoxamine) may have depressed iron values   Ferritin 08/24/2022 28  8 - 388 ng/mL Final     Please note: This report has been generated by a voice recognition software system  Therefore there may be syntax, spelling, and/or grammatical errors  Please call if you have any questions

## 2022-09-14 NOTE — TELEPHONE ENCOUNTER
Patient is also willing to go to [facility] infusion, please coordinate with [facility] to see availability till [facility] has an available spot for treatment  Thanks!

## 2022-09-14 NOTE — TELEPHONE ENCOUNTER
Please schedule patient for iron infusions  Patient prefers Wednesday or Fridays in the morning  Thanks!

## 2022-09-14 NOTE — TELEPHONE ENCOUNTER
Please change date for iron infusion to 9/23  Patient was already made aware of schedule and confirmed appointments  Thanks!

## 2022-09-14 NOTE — TELEPHONE ENCOUNTER
While we try to accommodate patient requests, our priority is to schedule treatment according to Doctor's orders and site availability  Follow-up disposition: Return in about 26 weeks (around 3/15/2023) for Office Visit, Labs - See Treatment Plan  BIBI  1  Does the Provider use the intake sheet or checkout note? 2  What would be a preferred day of the week that would work best for your infusion appointment? WED OR FRIDAY  3  Do you prefer mornings or afternoons for your appointments? MORNING  4  Are there any days or dates that do not work for your schedule, including any upcoming vacations? SCHOOL AND WORK AND THERAPY FOR CHILD  5  We are going to try our best to schedule you at the infusion center closest to your home  In the event that we are unable to what would be your next preferred infusion site or sites? [facility]    1  [facility]  2  [facility]  3      6  Do you have transportation to take you to all of your appointments? YES  7   Would you like the infusion center to draw labs from your port? (disregard if patient doesn't have a port or need labs for infusion appointment)

## 2022-09-23 ENCOUNTER — HOSPITAL ENCOUNTER (OUTPATIENT)
Dept: INFUSION CENTER | Facility: HOSPITAL | Age: 42
End: 2022-09-23
Attending: INTERNAL MEDICINE
Payer: COMMERCIAL

## 2022-09-23 VITALS
HEART RATE: 83 BPM | SYSTOLIC BLOOD PRESSURE: 140 MMHG | TEMPERATURE: 96.7 F | RESPIRATION RATE: 16 BRPM | OXYGEN SATURATION: 98 % | DIASTOLIC BLOOD PRESSURE: 84 MMHG

## 2022-09-23 DIAGNOSIS — Z86.2 HISTORY OF IRON DEFICIENCY ANEMIA: Primary | ICD-10-CM

## 2022-09-23 PROCEDURE — 96365 THER/PROPH/DIAG IV INF INIT: CPT

## 2022-09-23 RX ORDER — SODIUM CHLORIDE 9 MG/ML
20 INJECTION, SOLUTION INTRAVENOUS ONCE
Status: COMPLETED | OUTPATIENT
Start: 2022-09-23 | End: 2022-09-23

## 2022-09-23 RX ORDER — SODIUM CHLORIDE 9 MG/ML
20 INJECTION, SOLUTION INTRAVENOUS ONCE
Status: CANCELLED | OUTPATIENT
Start: 2022-09-30

## 2022-09-23 RX ADMIN — SODIUM CHLORIDE 20 ML/HR: 9 INJECTION, SOLUTION INTRAVENOUS at 10:35

## 2022-09-23 RX ADMIN — SODIUM CHLORIDE 300 MG: 0.9 INJECTION, SOLUTION INTRAVENOUS at 10:36

## 2022-09-23 NOTE — PROGRESS NOTES
Pt here today for venofer infusion tolerated well no adverse reactions declined AVS and is aware of next appointment  Pt left ambulatory with steady gait

## 2022-09-26 DIAGNOSIS — Z86.2 HISTORY OF IRON DEFICIENCY ANEMIA: Primary | ICD-10-CM

## 2022-09-26 RX ORDER — SODIUM CHLORIDE 9 MG/ML
20 INJECTION, SOLUTION INTRAVENOUS ONCE
Status: CANCELLED | OUTPATIENT
Start: 2022-09-28

## 2022-09-28 ENCOUNTER — HOSPITAL ENCOUNTER (OUTPATIENT)
Dept: INFUSION CENTER | Facility: HOSPITAL | Age: 42
Discharge: HOME | End: 2022-09-28
Attending: INTERNAL MEDICINE
Payer: COMMERCIAL

## 2022-09-28 VITALS
OXYGEN SATURATION: 99 % | HEART RATE: 87 BPM | SYSTOLIC BLOOD PRESSURE: 114 MMHG | TEMPERATURE: 96.6 F | RESPIRATION RATE: 16 BRPM | DIASTOLIC BLOOD PRESSURE: 62 MMHG

## 2022-09-28 DIAGNOSIS — Z86.2 HISTORY OF IRON DEFICIENCY ANEMIA: Primary | ICD-10-CM

## 2022-09-28 PROCEDURE — 96365 THER/PROPH/DIAG IV INF INIT: CPT

## 2022-09-28 RX ORDER — SODIUM CHLORIDE 9 MG/ML
20 INJECTION, SOLUTION INTRAVENOUS ONCE
Status: COMPLETED | OUTPATIENT
Start: 2022-09-28 | End: 2022-09-28

## 2022-09-28 RX ORDER — SODIUM CHLORIDE 9 MG/ML
20 INJECTION, SOLUTION INTRAVENOUS ONCE
Status: CANCELLED | OUTPATIENT
Start: 2022-10-05

## 2022-09-28 RX ADMIN — IRON SUCROSE 300 MG: 20 INJECTION, SOLUTION INTRAVENOUS at 10:05

## 2022-09-28 RX ADMIN — SODIUM CHLORIDE 20 ML/HR: 9 INJECTION, SOLUTION INTRAVENOUS at 09:32

## 2022-09-28 NOTE — PROGRESS NOTES
Pt received 300mg venofer without complication  PIV removed and bandage applied  Next appt scheduled  Pt left ambulatory with steady gait for d/c

## 2022-10-05 ENCOUNTER — HOSPITAL ENCOUNTER (OUTPATIENT)
Dept: INFUSION CENTER | Facility: HOSPITAL | Age: 42
Discharge: HOME | End: 2022-10-05
Attending: INTERNAL MEDICINE
Payer: COMMERCIAL

## 2022-10-05 VITALS
OXYGEN SATURATION: 100 % | RESPIRATION RATE: 16 BRPM | TEMPERATURE: 97.3 F | SYSTOLIC BLOOD PRESSURE: 120 MMHG | DIASTOLIC BLOOD PRESSURE: 55 MMHG | HEART RATE: 99 BPM

## 2022-10-05 DIAGNOSIS — Z86.2 HISTORY OF IRON DEFICIENCY ANEMIA: Primary | ICD-10-CM

## 2022-10-05 PROCEDURE — 96365 THER/PROPH/DIAG IV INF INIT: CPT

## 2022-10-05 RX ORDER — SODIUM CHLORIDE 9 MG/ML
20 INJECTION, SOLUTION INTRAVENOUS ONCE
Status: COMPLETED | OUTPATIENT
Start: 2022-10-05 | End: 2022-10-05

## 2022-10-05 RX ORDER — SODIUM CHLORIDE 9 MG/ML
20 INJECTION, SOLUTION INTRAVENOUS ONCE
Status: CANCELLED | OUTPATIENT
Start: 2022-10-05

## 2022-10-05 RX ADMIN — SODIUM CHLORIDE 20 ML/HR: 9 INJECTION, SOLUTION INTRAVENOUS at 10:00

## 2022-10-05 RX ADMIN — IRON SUCROSE 300 MG: 20 INJECTION, SOLUTION INTRAVENOUS at 09:59

## 2022-10-05 NOTE — PROGRESS NOTES
Pt tolerated tx without complication  Has a follow up with hematology  Left ambulatory with steady gait for d/c

## 2022-10-11 ENCOUNTER — HOSPITAL ENCOUNTER (OUTPATIENT)
Dept: RADIOLOGY | Age: 42
Discharge: HOME | End: 2022-10-11
Payer: COMMERCIAL

## 2022-10-11 VITALS — HEIGHT: 62 IN | BODY MASS INDEX: 47.48 KG/M2 | WEIGHT: 258 LBS

## 2022-10-11 DIAGNOSIS — Z12.31 ENCOUNTER FOR SCREENING MAMMOGRAM FOR MALIGNANT NEOPLASM OF BREAST: ICD-10-CM

## 2022-10-11 PROCEDURE — 77067 SCR MAMMO BI INCL CAD: CPT

## 2022-10-11 PROCEDURE — 77063 BREAST TOMOSYNTHESIS BI: CPT

## 2022-10-12 ENCOUNTER — TELEPHONE (OUTPATIENT)
Dept: FAMILY MEDICINE CLINIC | Facility: CLINIC | Age: 42
End: 2022-10-12

## 2022-10-12 DIAGNOSIS — R92.8 ABNORMAL MAMMOGRAM OF LEFT BREAST: Primary | ICD-10-CM

## 2022-10-12 DIAGNOSIS — R92.8 ABNORMAL MAMMOGRAM OF RIGHT BREAST: ICD-10-CM

## 2022-10-12 NOTE — TELEPHONE ENCOUNTER
Patient saw her results in my chart and was advised she is supposed to get an ultrasound of both breasts  Patient is asking if you can enter the ultrasound for both breasts

## 2022-10-24 ENCOUNTER — HOSPITAL ENCOUNTER (OUTPATIENT)
Dept: ULTRASOUND IMAGING | Facility: CLINIC | Age: 42
Discharge: HOME | End: 2022-10-24
Payer: COMMERCIAL

## 2022-10-24 DIAGNOSIS — R92.8 ABNORMAL MAMMOGRAM: ICD-10-CM

## 2022-10-24 PROCEDURE — 76642 ULTRASOUND BREAST LIMITED: CPT

## 2022-11-15 ENCOUNTER — APPOINTMENT (EMERGENCY)
Dept: RADIOLOGY | Facility: HOSPITAL | Age: 42
End: 2022-11-15

## 2022-11-15 ENCOUNTER — HOSPITAL ENCOUNTER (EMERGENCY)
Facility: HOSPITAL | Age: 42
Discharge: HOME | End: 2022-11-15
Attending: EMERGENCY MEDICINE

## 2022-11-15 VITALS
HEART RATE: 128 BPM | SYSTOLIC BLOOD PRESSURE: 138 MMHG | DIASTOLIC BLOOD PRESSURE: 76 MMHG | RESPIRATION RATE: 23 BRPM | OXYGEN SATURATION: 99 % | WEIGHT: 258 LBS | TEMPERATURE: 99.2 F | BODY MASS INDEX: 47.48 KG/M2 | HEIGHT: 62 IN

## 2022-11-15 DIAGNOSIS — J45.901 ASTHMA EXACERBATION: Primary | ICD-10-CM

## 2022-11-15 DIAGNOSIS — J20.5 RSV BRONCHITIS: ICD-10-CM

## 2022-11-15 LAB
ATRIAL RATE: 136 BPM
FLUAV RNA RESP QL NAA+PROBE: NEGATIVE
FLUBV RNA RESP QL NAA+PROBE: NEGATIVE
P AXIS: 77 DEGREES
PR INTERVAL: 118 MS
QRS AXIS: 58 DEGREES
QRSD INTERVAL: 68 MS
QT INTERVAL: 294 MS
QTC INTERVAL: 442 MS
RSV RNA RESP QL NAA+PROBE: POSITIVE
SARS-COV-2 RNA RESP QL NAA+PROBE: NEGATIVE
T WAVE AXIS: 84 DEGREES
VENTRICULAR RATE: 136 BPM

## 2022-11-15 RX ORDER — METHYLPREDNISOLONE 4 MG/1
TABLET ORAL
Qty: 21 TABLET | Refills: 0 | Status: SHIPPED | OUTPATIENT
Start: 2022-11-15

## 2022-11-15 RX ORDER — SODIUM CHLORIDE FOR INHALATION 0.9 %
3 VIAL, NEBULIZER (ML) INHALATION ONCE
Status: COMPLETED | OUTPATIENT
Start: 2022-11-15 | End: 2022-11-15

## 2022-11-15 RX ORDER — PREDNISONE 20 MG/1
40 TABLET ORAL ONCE
Status: COMPLETED | OUTPATIENT
Start: 2022-11-15 | End: 2022-11-15

## 2022-11-15 RX ORDER — BENZONATATE 100 MG/1
100 CAPSULE ORAL 3 TIMES DAILY PRN
Qty: 21 CAPSULE | Refills: 0 | Status: SHIPPED | OUTPATIENT
Start: 2022-11-15

## 2022-11-15 RX ORDER — IPRATROPIUM BROMIDE AND ALBUTEROL SULFATE 2.5; .5 MG/3ML; MG/3ML
3 SOLUTION RESPIRATORY (INHALATION) ONCE
Status: COMPLETED | OUTPATIENT
Start: 2022-11-15 | End: 2022-11-15

## 2022-11-15 RX ADMIN — PREDNISONE 40 MG: 20 TABLET ORAL at 05:53

## 2022-11-15 RX ADMIN — IPRATROPIUM BROMIDE AND ALBUTEROL SULFATE 3 ML: 2.5; .5 SOLUTION RESPIRATORY (INHALATION) at 05:53

## 2022-11-15 RX ADMIN — IPRATROPIUM BROMIDE 0.5 MG: 0.5 SOLUTION RESPIRATORY (INHALATION) at 06:28

## 2022-11-15 RX ADMIN — ISODIUM CHLORIDE 3 ML: 0.03 SOLUTION RESPIRATORY (INHALATION) at 06:28

## 2022-11-15 RX ADMIN — ALBUTEROL SULFATE 10 MG: 2.5 SOLUTION RESPIRATORY (INHALATION) at 06:28

## 2022-11-15 NOTE — Clinical Note
Pau Bobbyuton was seen and treated in our emergency department on 11/15/2022  Diagnosis:     Montell Gosselin  may return to work on return date  She may return on this date: 11/18/2022    May return sooner if feeling improved and afebrile  If you have any questions or concerns, please don't hesitate to call        Narinder Ruiz MD    ______________________________           _______________          _______________  Hospital Representative                              Date                                Time

## 2022-11-15 NOTE — ED PROVIDER NOTES
History  Chief Complaint   Patient presents with   • Shortness of Breath     Pt c/o of sob , pt states she might have a snus infection, she also thinks that her daughter brought something home form school  44 yo F w/ PMH of asthma, hypothyroid, psoriasis, hyperlipidemia, and iron deficiency anemia likely secondary to menorrhagia presents to ED with dyspnea  Started last night at work  Has congestion as well, cough  No fevers  No GI/ complaints  No leg swelling or h/o PE/DVT, no travel  Kids are ill with URI sx at home  She is covid vaccinated and boosted  Tried inhaler/neb at home w/out much relief  No h/o intubation for her asthma in past  Denies pregnancy - tubal        History provided by:  Patient and medical records   used: No    Shortness of Breath  Severity:  Moderate  Onset quality:  Gradual  Timing:  Constant  Progression:  Worsening  Chronicity:  Recurrent  Context: URI    Relieved by:  Nothing  Worsened by:  Nothing  Ineffective treatments:  Inhaler  Associated symptoms: cough    Associated symptoms: no abdominal pain, no chest pain, no claudication, no diaphoresis, no ear pain, no fever, no headaches, no hemoptysis, no neck pain, no PND, no rash, no sore throat, no sputum production, no syncope, no swollen glands, no vomiting and no wheezing    Risk factors: obesity    Risk factors: no hx of cancer, no hx of PE/DVT, no oral contraceptive use, no prolonged immobilization and no recent surgery        Prior to Admission Medications   Prescriptions Last Dose Informant Patient Reported? Taking?    albuterol (2 5 mg/3 mL) 0 083 % nebulizer solution  Self No No   Sig: Take 3 mL (2 5 mg total) by nebulization every 6 (six) hours as needed for wheezing or shortness of breath   albuterol (PROVENTIL HFA,VENTOLIN HFA) 90 mcg/act inhaler  Self No No   Sig: Inhale 2 puffs every 4 (four) hours as needed for wheezing or shortness of breath   betamethasone dipropionate (DIPROSONE) 0 05 % cream Self No No   Sig: Apply topically 2 (two) times a day   levothyroxine 75 mcg tablet  Self No No   Sig: Take 1 tablet (75 mcg total) by mouth daily   mometasone (ELOCON) 0 1 % cream  Self No No   Sig: Apply topically daily      Facility-Administered Medications: None       Past Medical History:   Diagnosis Date   • Abnormal Pap smear of cervix    • AMA (advanced maternal age) multigravida 35+    • Anemia    • Asthma    • Diabetes mellitus ([facility] [address] )     gd   • Disease of thyroid gland    • Fibroid     Was told during last pregnancy had fibroid  Asymtomtic   • Hypertension     pre ecl   • Hypothyroidism    • Kidney stone     Years ago   Had during last pregnancy   • Varicella     Patient did at child       Past Surgical History:   Procedure Laterality Date   •  SECTION  2009   •  SECTION  2012   • CHOLECYSTECTOMY  2013   • DENTAL SURGERY      [place] teeth extraction   • IN  DELIVERY ONLY N/A 2020    Procedure:  SECTION () REPEAT;  Surgeon: Kyle Villar MD;  [facility]: [facility];  Service: Obstetrics   • IN LIGATION,FALLOPIAN TUBE W/ Bilateral 2020    Procedure: LIGATION/COAGULATION TUBAL;  Surgeon: Kyle Villar MD;  Location[facility];  Service: Obstetrics       Family History   Problem Relation Age of Onset   • COPD Mother    • Heart disease Mother    • Alcohol abuse Father    • Depression Father    • Drug abuse Father    • Other Father         seizures, HIV   • Depression Sister    • Other Sister         seizures,  ptsd   • Mental illness Sister         anx   • Depression Daughter    • Other Daughter         seizures   • Mental illness Daughter         anx   • No Known Problems Daughter    • Other Maternal Grandmother         parkinsons   • No Known Problems Maternal Grandfather    • Other Paternal Grandmother         hypothyroid   • No Known Problems Paternal Grandfather    • Depression Brother    • Mental illness Brother         anx, bipolar • Mental illness Son         add, adhd, anx   • No Known Problems Half-Sister    • No Known Problems Half-Sister    • Diabetes Half-Brother         type 2     I have reviewed and agree with the history as documented  E-Cigarette/Vaping   • E-Cigarette Use Never User      E-Cigarette/Vaping Substances   • Nicotine No    • THC No    • CBD No    • Flavoring No    • Other No    • Unknown No      Social History     Tobacco Use   • Smoking status: Never Smoker   • Smokeless tobacco: Never Used   Vaping Use   • Vaping Use: Never used   Substance Use Topics   • Alcohol use: Yes     Comment: not currently   • Drug use: No       Review of Systems   Constitutional: Negative for appetite change, chills, diaphoresis, fatigue and fever  HENT: Negative for congestion, ear pain, rhinorrhea, sore throat, trouble swallowing and voice change  Eyes: Negative for pain and visual disturbance  Respiratory: Positive for cough and shortness of breath  Negative for hemoptysis, sputum production, chest tightness and wheezing  Cardiovascular: Negative for chest pain, palpitations, claudication, leg swelling, syncope and PND  Gastrointestinal: Negative for abdominal pain, blood in stool, constipation, diarrhea, nausea and vomiting  Genitourinary: Negative for difficulty urinating and hematuria  Musculoskeletal: Negative for back pain, neck pain and neck stiffness  Skin: Negative for rash  Neurological: Negative for dizziness, syncope, speech difficulty, light-headedness and headaches  Psychiatric/Behavioral: Negative for confusion and suicidal ideas  Physical Exam  Physical Exam  Vitals and nursing note reviewed  Constitutional:       General: She is not in acute distress  Appearance: She is well-developed  She is not diaphoretic  HENT:      Head: Normocephalic and atraumatic        Right Ear: External ear normal       Left Ear: External ear normal       Nose: Nose normal    Eyes:      General: No scleral icterus  Right eye: No discharge  Left eye: No discharge  Extraocular Movements: Extraocular movements intact  Conjunctiva/sclera: Conjunctivae normal       Pupils: Pupils are equal, round, and reactive to light  Neck:      Trachea: No tracheal deviation  Cardiovascular:      Rate and Rhythm: Normal rate and regular rhythm  Pulses: Normal pulses  Heart sounds: Normal heart sounds  No murmur heard  No friction rub  No gallop  Pulmonary:      Effort: Pulmonary effort is normal  Tachypnea present  No respiratory distress  Breath sounds: No stridor  Decreased breath sounds and wheezing present  Chest:      Chest wall: No tenderness  Abdominal:      General: Bowel sounds are normal       Palpations: Abdomen is soft  Tenderness: There is no abdominal tenderness  There is no guarding or rebound  Musculoskeletal:         General: No deformity  Normal range of motion  Cervical back: Normal range of motion and neck supple  Right lower leg: No tenderness  No edema  Left lower leg: No tenderness  No edema  Lymphadenopathy:      Cervical: No cervical adenopathy  Skin:     General: Skin is warm and dry  Findings: No rash  Neurological:      General: No focal deficit present  Mental Status: She is alert and oriented to person, place, and time  Cranial Nerves: No cranial nerve deficit  Sensory: No sensory deficit        Coordination: Coordination normal    Psychiatric:         Behavior: Behavior normal          Vital Signs  ED Triage Vitals   Temperature Pulse Respirations Blood Pressure SpO2   11/15/22 0541 11/15/22 0537 11/15/22 0537 11/15/22 0540 11/15/22 0537   99 2 °F (37 3 °C) (!) 132 (!) 24 (!) 192/98 97 %      Temp src Heart Rate Source Patient Position - Orthostatic VS BP Location FiO2 (%)   -- 11/15/22 0537 -- -- --    Monitor         Pain Score       --                  Vitals:    11/15/22 0540 11/15/22 0600 11/15/22 0630 11/15/22 0633   BP: (!) 192/98 136/61 148/89    Pulse:  (!) 128 (!) 122 (!) 123         Visual Acuity      ED Medications  Medications   ipratropium-albuterol (DUO-NEB) 0 5-2 5 mg/3 mL inhalation solution 3 mL (3 mL Nebulization Given 11/15/22 0553)   predniSONE tablet 40 mg (40 mg Oral Given 11/15/22 0553)   albuterol inhalation solution 10 mg (10 mg Nebulization Given 11/15/22 5784)     And   ipratropium (ATROVENT) 0 02 % inhalation solution 0 5 mg (0 5 mg Nebulization Given 11/15/22 0139)     And   sodium chloride 0 9 % inhalation solution 3 mL (3 mL Nebulization Given 11/15/22 5690)       Diagnostic Studies  Results Reviewed     Procedure Component Value Units Date/Time    FLU/RSV/COVID - if FLU/RSV clinically relevant [[id number]]  (Abnormal) Collected: 11/15/22 0546    Lab Status: Final result Specimen: Nares from Nose Updated: 11/15/22 0627     SARS-CoV-2 Negative     INFLUENZA A PCR Negative     INFLUENZA B PCR Negative     RSV PCR Positive    Narrative:      FOR PEDIATRIC PATIENTS - copy/paste COVID Guidelines URL to browser: [email or web address]? [email or web address]  [employer]    SARS-CoV-2 assay is a Nucleic Acid Amplification assay intended for the  qualitative detection of nucleic acid from SARS-CoV-2 in nasopharyngeal  swabs  Results are for the presumptive identification of SARS-CoV-2 RNA  Positive results are indicative of infection with SARS-CoV-2, the virus  causing COVID-19, but do not rule out bacterial infection or co-infection  with other viruses  Laboratories within the [place] and its  territories are required to report all positive results to the appropriate  public health authorities  Negative results do not preclude SARS-CoV-2  infection and should not be used as the sole basis for treatment or other  patient management decisions  Negative results must be combined with  clinical observations, patient history, and epidemiological information    This test has not been FDA cleared or approved  This test has been authorized by FDA under an Emergency Use Authorization  (EUA)  This test is only authorized for the duration of time the  declaration that circumstances exist justifying the authorization of the  emergency use of an in vitro diagnostic tests for detection of SARS-CoV-2  virus and/or diagnosis of COVID-19 infection under section 564(b)(1) of  the Act, 21 U  S C  424AEP-1(B)(5), unless the authorization is terminated  or revoked sooner  The test has been validated but independent review by FDA  and CLIA is pending  Test performed using [employer] GeneXpert: This RT-PCR assay targets N2,  a region unique to SARS-CoV-2  A conserved region in the E-gene was chosen  for pan-Sarbecovirus detection which includes SARS-CoV-2  According to CMS-2020-01-R, this platform meets the definition of high-throughput technology  XR chest 1 view portable   ED Interpretation by Gaurang Kirkpatrick MD (11/15 0558)   No ptx  No acute abnormality  Appears similar to prior                   Procedures  CriticalCare Time  Performed by: Gaurang Kirkpatrick MD  Authorized by: Gaurang Kirkpatrick MD     Critical care provider statement:     Critical care time (minutes):  90    Critical care time was exclusive of:  Separately billable procedures and treating other patients and teaching time    Critical care was necessary to treat or prevent imminent or life-threatening deterioration of the following conditions:  Respiratory failure    Critical care was time spent personally by me on the following activities:  Obtaining history from patient or surrogate, evaluation of patient's response to treatment, examination of patient, review of old charts, re-evaluation of patient's condition, ordering and review of radiographic studies, ordering and review of laboratory studies and ordering and performing treatments and interventions    I assumed direction of critical care for this patient from another provider in my specialty: no               ED Course  ED Course as of 11/15/22 0703   Tue Nov 15, 2022   0610 Pt feeling a little better, moving more air, still very wheezy  HAART neb ordered  3278 Pt with RSV  Feeling much better  Will d/c home after HAART neb  Discussed supportive care, and RTED precautions  Pt agrees with plan  SBIRT 20yo+    Flowsheet Row Most Recent Value   SBIRT (25 yo +)    In order to provide better care to our patients, we are screening all of our patients for alcohol and drug use  Would it be okay to ask you these screening questions? Yes Filed at: 11/15/2022 0548   Initial Alcohol Screen: US AUDIT-C     1  How often do you have a drink containing alcohol? 0 Filed at: 11/15/2022 0548   2  How many drinks containing alcohol do you have on a typical day you are drinking? 0 Filed at: 11/15/2022 0548   3a  Male UNDER 65: How often do you have five or more drinks on one occasion? 0 Filed at: 11/15/2022 0548   3b  FEMALE Any Age, or MALE 65+: How often do you have 4 or more drinks on one occassion? 0 Filed at: 11/15/2022 0548   Audit-C Score 0 Filed at: 11/15/2022 2100   NABILA: How many times in the past year have you    Used an illegal drug or used a prescription medication for non-medical reasons?  Never Filed at: 11/15/2022 0548                    MDM  Number of Diagnoses or Management Options  Asthma exacerbation: new and requires workup  RSV bronchitis: new and requires workup     Amount and/or Complexity of Data Reviewed  Clinical lab tests: ordered and reviewed  Tests in the radiology section of CPT®: ordered and reviewed  Tests in the medicine section of CPT®: ordered and reviewed  Review and summarize past medical records: yes  Independent visualization of images, tracings, or specimens: yes    Risk of Complications, Morbidity, and/or Mortality  Presenting problems: moderate  Diagnostic procedures: low  Management options: low    Patient Progress  Patient progress: improved      Disposition  Final diagnoses:   Asthma exacerbation   RSV bronchitis     Time reflects when diagnosis was documented in both MDM as applicable and the Disposition within this note     Time User Action Codes Description Comment    11/15/2022  6:58 AM Valery Critchley S Add [J45 901] Asthma exacerbation     11/15/2022  6:59 AM Valery Critchley S Add [B33 8] RSV infection     11/15/2022  7:00 AM Valery Critchley S Add [J20 5] RSV bronchitis     11/15/2022  7:00 AM Chely Fraga Remove [B33 8] RSV infection       ED Disposition     ED Disposition   Discharge    Condition   Stable    Date/Time   Tue Nov 15, 2022  6:58 AM    Comment   Nai Connelly discharge to home/self care  Follow-up Information     Follow up With Specialties Details Why Contact Info Additional Information    Juan A Jarrell MD Family Medicine Schedule an appointment as soon as possible for a visit   [address]        [address] Emergency Department Emergency Medicine  If symptoms worsen [address] [address]  [address] [facility] Emergency Department, [address] [place] [address] [address]          Patient's Medications   Discharge Prescriptions    BENZONATATE (TESSALON PERLES) 100 MG CAPSULE    Take 1 capsule (100 mg total) by mouth 3 (three) times a day as needed for cough       Start Date: 11/15/2022End Date: --       Order Dose: 100 mg       Quantity: 21 capsule    Refills: 0    METHYLPREDNISOLONE 4 MG TABLET THERAPY PACK    Use as directed on package       Start Date: 11/15/2022End Date: --       Order Dose: --       Quantity: 21 tablet    Refills: 0       No discharge procedures on file      PDMP Review     None          ED Provider  Electronically Signed by           Srinivasa Graham MD  11/15/22 1945

## 2022-11-17 ENCOUNTER — APPOINTMENT (EMERGENCY)
Dept: RADIOLOGY | Facility: HOSPITAL | Age: 42
End: 2022-11-17
Attending: EMERGENCY MEDICINE

## 2022-11-17 ENCOUNTER — HOSPITAL ENCOUNTER (EMERGENCY)
Facility: HOSPITAL | Age: 42
Discharge: HOME | End: 2022-11-17
Attending: EMERGENCY MEDICINE

## 2022-11-17 ENCOUNTER — APPOINTMENT (EMERGENCY)
Dept: CT IMAGING | Facility: HOSPITAL | Age: 42
End: 2022-11-17

## 2022-11-17 VITALS
DIASTOLIC BLOOD PRESSURE: 109 MMHG | SYSTOLIC BLOOD PRESSURE: 176 MMHG | OXYGEN SATURATION: 97 % | RESPIRATION RATE: 20 BRPM | WEIGHT: 258 LBS | HEART RATE: 118 BPM | BODY MASS INDEX: 47.48 KG/M2 | TEMPERATURE: 98.5 F | HEIGHT: 62 IN

## 2022-11-17 DIAGNOSIS — J21.0 RSV (ACUTE BRONCHIOLITIS DUE TO RESPIRATORY SYNCYTIAL VIRUS): Primary | ICD-10-CM

## 2022-11-17 DIAGNOSIS — B34.9 ACUTE VIRAL SYNDROME: ICD-10-CM

## 2022-11-17 LAB
ALBUMIN SERPL BCP-MCNC: 3.4 G/DL (ref 3.5–5)
ALP SERPL-CCNC: 103 U/L (ref 46–116)
ALT SERPL W P-5'-P-CCNC: 27 U/L (ref 12–78)
ANION GAP SERPL CALCULATED.3IONS-SCNC: 10 MMOL/L (ref 4–13)
APTT PPP: 24 SECONDS (ref 23–37)
ATRIAL RATE: 110 BPM
BASE EXCESS BLDA CALC-SCNC: 2 MMOL/L (ref -2–3)
BASOPHILS # BLD AUTO: 0.03 THOUSANDS/ÂΜL (ref 0–0.1)
BASOPHILS NFR BLD AUTO: 0 % (ref 0–1)
BILIRUB SERPL-MCNC: 0.1 MG/DL (ref 0.2–1)
BUN SERPL-MCNC: 13 MG/DL (ref 5–25)
CA-I BLD-SCNC: 1.21 MMOL/L (ref 1.12–1.32)
CALCIUM ALBUM COR SERPL-MCNC: 10.2 MG/DL (ref 8.3–10.1)
CALCIUM SERPL-MCNC: 9.7 MG/DL (ref 8.3–10.1)
CARDIAC TROPONIN I PNL SERPL HS: 4 NG/L
CHLORIDE SERPL-SCNC: 104 MMOL/L (ref 96–108)
CO2 SERPL-SCNC: 26 MMOL/L (ref 21–32)
CREAT SERPL-MCNC: 0.84 MG/DL (ref 0.6–1.3)
D DIMER PPP FEU-MCNC: 0.52 UG/ML FEU
EOSINOPHIL # BLD AUTO: 0.01 THOUSAND/ÂΜL (ref 0–0.61)
EOSINOPHIL NFR BLD AUTO: 0 % (ref 0–6)
ERYTHROCYTE [DISTWIDTH] IN BLOOD BY AUTOMATED COUNT: 19.8 % (ref 11.6–15.1)
GFR SERPL CREATININE-BSD FRML MDRD: 86 ML/MIN/1.73SQ M
GLUCOSE SERPL-MCNC: 119 MG/DL (ref 65–140)
GLUCOSE SERPL-MCNC: 121 MG/DL (ref 65–140)
HCO3 BLDA-SCNC: 26.7 MMOL/L (ref 24–30)
HCT VFR BLD AUTO: 41.7 % (ref 34.8–46.1)
HCT VFR BLD CALC: 39 % (ref 34.8–46.1)
HGB BLD-MCNC: 12.7 G/DL (ref 11.5–15.4)
HGB BLDA-MCNC: 13.3 G/DL (ref 11.5–15.4)
IMM GRANULOCYTES # BLD AUTO: 0.04 THOUSAND/UL (ref 0–0.2)
IMM GRANULOCYTES NFR BLD AUTO: 0 % (ref 0–2)
INR PPP: 0.9 (ref 0.84–1.19)
LYMPHOCYTES # BLD AUTO: 0.93 THOUSANDS/ÂΜL (ref 0.6–4.47)
LYMPHOCYTES NFR BLD AUTO: 10 % (ref 14–44)
MCH RBC QN AUTO: 24.8 PG (ref 26.8–34.3)
MCHC RBC AUTO-ENTMCNC: 30.5 G/DL (ref 31.4–37.4)
MCV RBC AUTO: 81 FL (ref 82–98)
MONOCYTES # BLD AUTO: 0.64 THOUSAND/ÂΜL (ref 0.17–1.22)
MONOCYTES NFR BLD AUTO: 7 % (ref 4–12)
NEUTROPHILS # BLD AUTO: 7.45 THOUSANDS/ÂΜL (ref 1.85–7.62)
NEUTS SEG NFR BLD AUTO: 83 % (ref 43–75)
NRBC BLD AUTO-RTO: 0 /100 WBCS
P AXIS: 75 DEGREES
PCO2 BLD: 28 MMOL/L (ref 21–32)
PCO2 BLD: 39.8 MM HG (ref 42–50)
PH BLD: 7.43 [PH] (ref 7.3–7.4)
PLATELET # BLD AUTO: 320 THOUSANDS/UL (ref 149–390)
PMV BLD AUTO: 10 FL (ref 8.9–12.7)
PO2 BLD: 28 MM HG (ref 35–45)
POTASSIUM BLD-SCNC: 4.2 MMOL/L (ref 3.5–5.3)
POTASSIUM SERPL-SCNC: 4.2 MMOL/L (ref 3.5–5.3)
PR INTERVAL: 120 MS
PROT SERPL-MCNC: 8.8 G/DL (ref 6.4–8.4)
PROTHROMBIN TIME: 12.9 SECONDS (ref 11.6–14.5)
QRS AXIS: 59 DEGREES
QRSD INTERVAL: 68 MS
QT INTERVAL: 320 MS
QTC INTERVAL: 433 MS
RBC # BLD AUTO: 5.12 MILLION/UL (ref 3.81–5.12)
SAO2 % BLD FROM PO2: 54 % (ref 60–85)
SODIUM BLD-SCNC: 140 MMOL/L (ref 136–145)
SODIUM SERPL-SCNC: 140 MMOL/L (ref 135–147)
SPECIMEN SOURCE: ABNORMAL
T WAVE AXIS: 70 DEGREES
VENTRICULAR RATE: 110 BPM
WBC # BLD AUTO: 9.1 THOUSAND/UL (ref 4.31–10.16)

## 2022-11-17 RX ORDER — BENZONATATE 100 MG/1
100 CAPSULE ORAL ONCE
Status: COMPLETED | OUTPATIENT
Start: 2022-11-17 | End: 2022-11-17

## 2022-11-17 RX ORDER — KETOROLAC TROMETHAMINE 30 MG/ML
15 INJECTION, SOLUTION INTRAMUSCULAR; INTRAVENOUS ONCE
Status: COMPLETED | OUTPATIENT
Start: 2022-11-17 | End: 2022-11-17

## 2022-11-17 RX ORDER — OXYMETAZOLINE HYDROCHLORIDE 0.05 G/100ML
2 SPRAY NASAL ONCE
Status: COMPLETED | OUTPATIENT
Start: 2022-11-17 | End: 2022-11-17

## 2022-11-17 RX ORDER — ALBUTEROL SULFATE 2.5 MG/3ML
5 SOLUTION RESPIRATORY (INHALATION) ONCE
Status: COMPLETED | OUTPATIENT
Start: 2022-11-17 | End: 2022-11-17

## 2022-11-17 RX ORDER — ACETAMINOPHEN 325 MG/1
975 TABLET ORAL ONCE
Status: COMPLETED | OUTPATIENT
Start: 2022-11-17 | End: 2022-11-17

## 2022-11-17 RX ORDER — FLUTICASONE PROPIONATE 50 MCG
1 SPRAY, SUSPENSION (ML) NASAL DAILY
Qty: 16 G | Refills: 0 | Status: SHIPPED | OUTPATIENT
Start: 2022-11-17

## 2022-11-17 RX ADMIN — IPRATROPIUM BROMIDE 0.5 MG: 0.5 SOLUTION RESPIRATORY (INHALATION) at 06:01

## 2022-11-17 RX ADMIN — OXYMETAZOLINE HYDROCHLORIDE 2 SPRAY: 0.05 SPRAY NASAL at 06:28

## 2022-11-17 RX ADMIN — SODIUM CHLORIDE 1000 ML: 0.9 INJECTION, SOLUTION INTRAVENOUS at 05:58

## 2022-11-17 RX ADMIN — BENZONATATE 100 MG: 100 CAPSULE ORAL at 06:28

## 2022-11-17 RX ADMIN — KETOROLAC TROMETHAMINE 15 MG: 30 INJECTION, SOLUTION INTRAMUSCULAR; INTRAVENOUS at 06:00

## 2022-11-17 RX ADMIN — ALBUTEROL SULFATE 5 MG: 2.5 SOLUTION RESPIRATORY (INHALATION) at 06:01

## 2022-11-17 RX ADMIN — ACETAMINOPHEN 975 MG: 325 TABLET, FILM COATED ORAL at 06:00

## 2022-11-17 RX ADMIN — IOHEXOL 85 ML: 350 INJECTION, SOLUTION INTRAVENOUS at 06:42

## 2022-11-17 NOTE — ED PROVIDER NOTES
History  Chief Complaint   Patient presents with   • Pain With Breathing     Pt here 2 days ago with RSV dx and asthma  Reports now having pain when taking a deep breath  Reports pain in the knees that radiate up the legs  Denies CP, slightly SOB  Reports 2 puffs of inhalers and 1 neb tx PTA  49-year-old female with past medical history of anemia,  Asthma presents for evaluation of shortness of breath, chest tightness left-sided chest pain worse with taking a deep breath as well as body aches  Also with frontal congestion and frontal headache similar to her previous sinus infections  Was seen in the emergency department 2 days ago for shortness of breath at that time was diagnosed with  RSV bronchiolitis and discharged  On steroids, has been using her inhaler with minimal relief  Also has been using Tylenol and Motrin most recent dose was Motrin at 02:00  Sick contacts include her children with similar symptoms  No history of DVT or PE, no DVT or PE risk factors          Prior to Admission Medications   Prescriptions Last Dose Informant Patient Reported? Taking?    albuterol (2 5 mg/3 mL) 0 083 % nebulizer solution   No No   Sig: Take 3 mL (2 5 mg total) by nebulization every 6 (six) hours as needed for wheezing or shortness of breath   albuterol (PROVENTIL HFA,VENTOLIN HFA) 90 mcg/act inhaler   No No   Sig: Inhale 2 puffs every 4 (four) hours as needed for wheezing or shortness of breath   benzonatate (TESSALON PERLES) 100 mg capsule   No No   Sig: Take 1 capsule (100 mg total) by mouth 3 (three) times a day as needed for cough   betamethasone dipropionate (DIPROSONE) 0 05 % cream   No No   Sig: Apply topically 2 (two) times a day   levothyroxine 75 mcg tablet   No No   Sig: Take 1 tablet (75 mcg total) by mouth daily   methylPREDNISolone 4 MG tablet therapy pack   No No   Sig: Use as directed on package   mometasone (ELOCON) 0 1 % cream   No No   Sig: Apply topically daily      Facility-Administered Medications: None       Past Medical History:   Diagnosis Date   • Abnormal Pap smear of cervix    • AMA (advanced maternal age) multigravida 35+    • Anemia    • Asthma    • Diabetes mellitus ([facility] [address] )     gd   • Disease of thyroid gland    • Fibroid     Was told during last pregnancy had fibroid  Asymtomtic   • Hypertension     pre ecl   • Hypothyroidism    • Kidney stone     Years ago   Had during last pregnancy   • RSV (acute bronchiolitis due to respiratory syncytial virus)    • Varicella     Patient did at child       Past Surgical History:   Procedure Laterality Date   •  SECTION  2009   •  SECTION  2012   • CHOLECYSTECTOMY  2013   • DENTAL SURGERY      [place] teeth extraction   • [place]  DELIVERY ONLY N/A 2020    Procedure:  SECTION () REPEAT;  Surgeon: Mozella Burkitt, MD;  [facility]: [facility];  Service: Obstetrics   • [place] LIGATION,FALLOPIAN TUBE W/ Bilateral 2020    Procedure: LIGATION/COAGULATION TUBAL;  Surgeon: Mozella Burkitt, MD;  [facility]: [facility];  Service: Obstetrics       Family History   Problem Relation Age of Onset   • COPD Mother    • Heart disease Mother    • Alcohol abuse Father    • Depression Father    • Drug abuse Father    • Other Father         seizures, HIV   • Depression Sister    • Other Sister         seizures,  ptsd   • Mental illness Sister         anx   • Depression Daughter    • Other Daughter         seizures   • Mental illness Daughter         anx   • No Known Problems Daughter    • Other Maternal Grandmother         parkinsons   • No Known Problems Maternal Grandfather    • Other Paternal Grandmother         hypothyroid   • No Known Problems Paternal Grandfather    • Depression Brother    • Mental illness Brother         anx, bipolar   • Mental illness Son         add, adhd, anx   • No Known Problems Half-Sister    • No Known Problems Half-Sister    • Diabetes Half-Brother         type 2     I have reviewed and agree with the history as documented  E-Cigarette/Vaping   • E-Cigarette Use Never User      E-Cigarette/Vaping Substances   • Nicotine No    • THC No    • CBD No    • Flavoring No    • Other No    • Unknown No      Social History     Tobacco Use   • Smoking status: Never   • Smokeless tobacco: Never   Vaping Use   • Vaping Use: Never used   Substance Use Topics   • Alcohol use: Not Currently     Comment: not currently   • Drug use: No       Review of Systems   Constitutional: Negative for appetite change and fever  HENT: Positive for congestion and rhinorrhea  Negative for sore throat  Eyes: Negative for photophobia and visual disturbance  Respiratory: Positive for chest tightness and shortness of breath  Negative for cough and wheezing  Cardiovascular: Positive for chest pain  Negative for palpitations and leg swelling  Gastrointestinal: Negative for abdominal distention, abdominal pain, blood in stool, constipation and diarrhea  Genitourinary: Negative for dysuria, flank pain, frequency, hematuria and urgency  Musculoskeletal: Negative for back pain  Skin: Negative for rash  Neurological: Positive for headaches  Negative for dizziness and weakness  All other systems reviewed and are negative  Physical Exam  Physical Exam  Vitals and nursing note reviewed  Constitutional:       Appearance: She is well-developed and well-nourished  HENT:      Head: Normocephalic and atraumatic  Comments: Tenderness to percussion over frontal and maxillary sinuses     Nose: No congestion or rhinorrhea  Mouth/Throat:      Mouth: Mucous membranes are moist    Eyes:      Extraocular Movements: EOM normal       Pupils: Pupils are equal, round, and reactive to light  Cardiovascular:      Rate and Rhythm: Normal rate and regular rhythm  Heart sounds: No murmur heard  No friction rub  No gallop  Pulmonary:      Effort: Pulmonary effort is normal       Breath sounds:  No wheezing or rales    Chest:      Chest wall: No tenderness  Abdominal:      General: There is no distension  Palpations: Abdomen is soft  There is no mass  Tenderness: There is no abdominal tenderness  There is no guarding or rebound  Musculoskeletal:         General: Normal range of motion  Cervical back: Normal range of motion and neck supple  Right lower leg: No edema  Left lower leg: No edema  Skin:     General: Skin is warm and dry  Neurological:      Mental Status: She is alert and oriented to person, place, and time     Psychiatric:         Mood and Affect: Mood and affect normal          Vital Signs  ED Triage Vitals [11/17/22 0535]   Temperature Pulse Respirations Blood Pressure SpO2   98 5 °F (36 9 °C) (!) 118 20 (!) 176/109 97 %      Temp Source Heart Rate Source Patient Position - Orthostatic VS BP Location FiO2 (%)   Oral Monitor -- Left arm --      Pain Score       7           Vitals:    11/17/22 0535   BP: (!) 176/109   Pulse: (!) 118         Visual Acuity      ED Medications  Medications   sodium chloride 0 9 % bolus 1,000 mL (1,000 mL Intravenous New Bag 11/17/22 0558)   albuterol inhalation solution 5 mg (5 mg Nebulization Given 11/17/22 0601)   ipratropium (ATROVENT) 0 02 % inhalation solution 0 5 mg (0 5 mg Nebulization Given 11/17/22 0601)   ketorolac (TORADOL) injection 15 mg (15 mg Intravenous Given 11/17/22 0600)   acetaminophen (TYLENOL) tablet 975 mg (975 mg Oral Given 11/17/22 0600)   benzonatate (TESSALON PERLES) capsule 100 mg (100 mg Oral Given 11/17/22 0628)   oxymetazoline (AFRIN) 0 05 % nasal spray 2 spray (2 sprays Each Nare Given 11/17/22 0628)   iohexol (OMNIPAQUE) 350 MG/ML injection (MULTI-DOSE) 100 mL (85 mL Intravenous Given 11/17/22 0642)       Diagnostic Studies  Results Reviewed     Procedure Component Value Units Date/Time    HS Troponin I 2hr [[id number]]     Lab Status: No result Specimen: Blood     HS Troponin 0hr (reflex protocol) [[id number]] (Normal) Collected: 11/17/22 0556    Lab Status: Final result Specimen: Blood from Arm, Right Updated: 11/17/22 0630     hs TnI 0hr 4 ng/L     D-dimer, quantitative [[id number]]  (Abnormal) Collected: 11/17/22 0556    Lab Status: Final result Specimen: Blood from Arm, Right Updated: 11/17/22 0625     D-Dimer, Quant 0 52 ug/ml FEU     Comprehensive metabolic panel [[id number]]  (Abnormal) Collected: 11/17/22 0556    Lab Status: Final result Specimen: Blood from Arm, Right Updated: 11/17/22 0625     Sodium 140 mmol/L      Potassium 4 2 mmol/L      Chloride 104 mmol/L      CO2 26 mmol/L      ANION GAP 10 mmol/L      BUN 13 mg/dL      Creatinine 0 84 mg/dL      Glucose 119 mg/dL      Calcium 9 7 mg/dL      Corrected Calcium 10 2 mg/dL      AST --     ALT 27 U/L      Alkaline Phosphatase 103 U/L      Total Protein 8 8 g/dL      Albumin 3 4 g/dL      Total Bilirubin 0 10 mg/dL      eGFR 86 ml/min/1 73sq m     Narrative:      [place] guidelines for Chronic Kidney Disease (CKD):   •  Stage 1 with normal or high GFR (GFR > 90 mL/min/1 73 square meters)  •  Stage 2 Mild CKD (GFR = 60-89 mL/min/1 73 square meters)  •  Stage 3A Moderate CKD (GFR = 45-59 mL/min/1 73 square meters)  •  Stage 3B Moderate CKD (GFR = 30-44 mL/min/1 73 square meters)  •  Stage 4 Severe CKD (GFR = 15-29 mL/min/1 73 square meters)  •  Stage 5 End Stage CKD (GFR <15 mL/min/1 73 square meters)  Note: GFR calculation is accurate only with a steady state creatinine    Protime-INR [[id number]]  (Normal) Collected: 11/17/22 0556    Lab Status: Final result Specimen: Blood from Arm, Right Updated: 11/17/22 0621     Protime 12 9 seconds      INR 0 90    APTT [[id number]]  (Normal) Collected: 11/17/22 0556    Lab Status: Final result Specimen: Blood from Arm, Right Updated: 11/17/22 0621     PTT 24 seconds     CBC and differential [[id number]]  (Abnormal) Collected: 11/17/22 0556    Lab Status: Final result Specimen: Blood from Arm, Right Updated: 11/17/22 0608     WBC 9 10 Thousand/uL      RBC 5 12 Million/uL      Hemoglobin 12 7 g/dL      Hematocrit 41 7 %      MCV 81 fL      MCH 24 8 pg      MCHC 30 5 g/dL      RDW 19 8 %      MPV 10 0 fL      Platelets 949 Thousands/uL      nRBC 0 /100 WBCs      Neutrophils Relative 83 %      Immat GRANS % 0 %      Lymphocytes Relative 10 %      Monocytes Relative 7 %      Eosinophils Relative 0 %      Basophils Relative 0 %      Neutrophils Absolute 7 45 Thousands/µL      Immature Grans Absolute 0 04 Thousand/uL      Lymphocytes Absolute 0 93 Thousands/µL      Monocytes Absolute 0 64 Thousand/µL      Eosinophils Absolute 0 01 Thousand/µL      Basophils Absolute 0 03 Thousands/µL     POCT Blood Gas (CG8+) [[id number]]  (Abnormal) Collected: 11/17/22 0603    Lab Status: Final result Specimen: Venous Updated: 11/17/22 0606     ph, Landon ISTAT 7 434     pCO2, Landon i-STAT 39 8 mm HG      pO2, Landon i-STAT 28 0 mm HG      BE, i-STAT 2 mmol/L      HCO3, Landon i-STAT 26 7 mmol/L      CO2, i-STAT 28 mmol/L      O2 Sat, i-STAT 54 %      SODIUM, I-STAT 140 mmol/l      Potassium, i-STAT 4 2 mmol/L      Calcium, Ionized i-STAT 1 21 mmol/L      Hct, i-STAT 39 %      Hgb, i-STAT 13 3 g/dl      Glucose, i-STAT 121 mg/dl      Specimen Type VENOUS    POCT pregnancy, urine [[id number]]     Lab Status: No result                  CTA ED chest PE Study   Final Result by Stephen Ricks MD (11/17 0702)      No pulmonary embolus is seen; although sensitivity is limited for evaluation of segmental or subsegmental pulmonary emboli, the presence of large or central pulmonary embolus is confidently excluded by this examination  No CT findings to account for the    patient's symptoms        Workstation performed: [id number]         XR chest portable   ED Interpretation by Shailesh Nicole DO (11/17 0559)   This study was ordered and independently reviewed by me    No acute findings noted         Final Result by Gilberto Wagner MD (10/21 0172) No acute cardiopulmonary disease                    Workstation performed: [id number]                    Procedures  Procedures         ED Course  ED Course as of 11/17/22 0735   Thu Nov 17, 2022   0559 Procedure Note: EKG  Date/Time: 11/17/22 6:00 AM   Performed by: Severo Nay  Authorized by: Severo Nay  Indications / Diagnosis: CP  ECG reviewed by me, the ED Provider: yes   The EKG demonstrates:  Rhythm: normal sinus  Intervals: normal intervals  Axis: normal axis  QRS/Blocks:normal QRS  ST Changes: No acute ST Changes, no STD/BARBRA        0724  Feeling better no acute complaints no current chest pain shortness of breath, does have some mild tachycardia heart rate at rest prior to that was in mid 90s                                   Wells' Criteria for PE    Flowsheet Row Most Recent Value   Wells' Criteria for PE    Clinical signs and symptoms of DVT 0 Filed at: 11/17/2022 5560   PE is primary diagnosis or equally likely 0 Filed at: 11/17/2022 0626   HR >100 1 5 Filed at: 11/17/2022 0626   Immobilization at least 3 days or Surgery in the previous 4 weeks 0 Filed at: 11/17/2022 0036   Previous, objectively diagnosed PE or DVT 0 Filed at: 11/17/2022 0626   Hemoptysis 0 Filed at: 11/17/2022 0756   Malignancy with treatment within 6 months or palliative 0 Filed at: 11/17/2022 8839   Wells' Criteria Total 1 5 Filed at: 11/17/2022 3057                MDM  Number of Diagnoses or Management Options  Acute viral syndrome: established and worsening  RSV (acute bronchiolitis due to respiratory syncytial virus): established and worsening  Diagnosis management comments:  51-year-old female presents for evaluation of shortness of breath, chest tightness was diagnosed with RSV bronchiolitis at the emergency department 2 days ago will treat symptomatically obtain chest x-ray re-evaluate        Amount and/or Complexity of Data Reviewed  Clinical lab tests: ordered and reviewed  Tests in the radiology section of CPT®: reviewed and ordered  Tests in the medicine section of CPT®: reviewed and ordered  Decide to obtain previous medical records or to obtain history from someone other than the patient: yes  Review and summarize past medical records: yes  Independent visualization of images, tracings, or specimens: yes        Disposition  Final diagnoses:   RSV (acute bronchiolitis due to respiratory syncytial virus)   Acute viral syndrome     Time reflects when diagnosis was documented in both MDM as applicable and the Disposition within this note     Time User Action Codes Description Comment    11/17/2022  7:25 AM Raymona Koyanagi Add [J21 0] RSV (acute bronchiolitis due to respiratory syncytial virus)     11/17/2022  7:25 AM Raymona Koyanagi Add [B34 9] Acute viral syndrome       ED Disposition     ED Disposition   Discharge    Condition   Stable    Date/Time   Thu Nov 17, 2022  7:26 AM    Comment   Krishna Urban discharge to home/self care  Follow-up Information     Follow up With Specialties Details Why Contact Info Additional Information    Mike Sun MD Family Medicine Schedule an appointment as soon as possible for a visit   [facility] [address]        [address] Emergency Department Emergency Medicine  If symptoms worsen [address] [address]  [address] [facility], [address] [place] [address] [address]          Patient's Medications   Discharge Prescriptions    FLUTICASONE (FLONASE) 50 MCG/ACT NASAL SPRAY    1 spray into each nostril daily       Start Date: 11/17/2022End Date: --       Order Dose: 1 spray       Quantity: 16 g    Refills: 0       No discharge procedures on file      PDMP Review     None          ED Provider  Electronically Signed by           Judd Berkowitz DO  11/17/22 4471

## 2022-11-17 NOTE — Clinical Note
Logantonmilana Mendez was seen and treated in our emergency department on 11/17/2022  No restrictions            Diagnosis:     [facility]  may return to work on return date  She may return on this date: 11/19/2022         If you have any questions or concerns, please don't hesitate to call        Lanre Barron, DO    ______________________________           _______________          _______________  Hospital Representative                              Date                                Time

## 2023-03-12 DIAGNOSIS — L40.9 PSORIASIS: ICD-10-CM

## 2023-03-12 DIAGNOSIS — J45.20 MILD INTERMITTENT ASTHMA WITHOUT COMPLICATION: ICD-10-CM

## 2023-03-13 RX ORDER — ALBUTEROL SULFATE 90 UG/1
2 AEROSOL, METERED RESPIRATORY (INHALATION) EVERY 4 HOURS PRN
Qty: 8 G | Refills: 0 | Status: SHIPPED | OUTPATIENT
Start: 2023-03-13

## 2023-03-13 RX ORDER — BETAMETHASONE DIPROPIONATE 0.5 MG/G
CREAM TOPICAL 2 TIMES DAILY
Qty: 45 G | Refills: 0 | Status: SHIPPED | OUTPATIENT
Start: 2023-03-13

## 2023-03-23 ENCOUNTER — TELEPHONE (OUTPATIENT)
Dept: HEMATOLOGY ONCOLOGY | Facility: MEDICAL CENTER | Age: 43
End: 2023-03-23

## 2023-03-24 ENCOUNTER — APPOINTMENT (OUTPATIENT)
Dept: LAB | Facility: CLINIC | Age: 43
End: 2023-03-24

## 2023-03-24 LAB
ALBUMIN SERPL BCP-MCNC: 3.6 G/DL (ref 3.5–5)
ALP SERPL-CCNC: 84 U/L (ref 46–116)
ALT SERPL W P-5'-P-CCNC: 28 U/L (ref 12–78)
ANION GAP SERPL CALCULATED.3IONS-SCNC: 3 MMOL/L (ref 4–13)
AST SERPL W P-5'-P-CCNC: 15 U/L (ref 5–45)
BILIRUB SERPL-MCNC: 0.35 MG/DL (ref 0.2–1)
BUN SERPL-MCNC: 13 MG/DL (ref 5–25)
CALCIUM SERPL-MCNC: 9.6 MG/DL (ref 8.3–10.1)
CHLORIDE SERPL-SCNC: 108 MMOL/L (ref 96–108)
CO2 SERPL-SCNC: 25 MMOL/L (ref 21–32)
CREAT SERPL-MCNC: 0.72 MG/DL (ref 0.6–1.3)
ERYTHROCYTE [DISTWIDTH] IN BLOOD BY AUTOMATED COUNT: 15 % (ref 11.6–15.1)
FERRITIN SERPL-MCNC: 114 NG/ML (ref 8–388)
GFR SERPL CREATININE-BSD FRML MDRD: 102 ML/MIN/1.73SQ M
GLUCOSE P FAST SERPL-MCNC: 89 MG/DL (ref 65–99)
HCT VFR BLD AUTO: 39 % (ref 34.8–46.1)
HGB BLD-MCNC: 11.9 G/DL (ref 11.5–15.4)
IRON SATN MFR SERPL: 16 % (ref 15–50)
IRON SERPL-MCNC: 44 UG/DL (ref 50–170)
MCH RBC QN AUTO: 25.9 PG (ref 26.8–34.3)
MCHC RBC AUTO-ENTMCNC: 30.5 G/DL (ref 31.4–37.4)
MCV RBC AUTO: 85 FL (ref 82–98)
PLATELET # BLD AUTO: 293 THOUSANDS/UL (ref 149–390)
PMV BLD AUTO: 11.6 FL (ref 8.9–12.7)
POTASSIUM SERPL-SCNC: 4.3 MMOL/L (ref 3.5–5.3)
PROT SERPL-MCNC: 7.4 G/DL (ref 6.4–8.4)
RBC # BLD AUTO: 4.59 MILLION/UL (ref 3.81–5.12)
SODIUM SERPL-SCNC: 136 MMOL/L (ref 135–147)
TIBC SERPL-MCNC: 282 UG/DL (ref 250–450)
WBC # BLD AUTO: 8.52 THOUSAND/UL (ref 4.31–10.16)

## 2023-03-27 ENCOUNTER — OFFICE VISIT (OUTPATIENT)
Dept: HEMATOLOGY ONCOLOGY | Facility: CLINIC | Age: 43
End: 2023-03-27

## 2023-03-27 VITALS
RESPIRATION RATE: 22 BRPM | TEMPERATURE: 96.7 F | BODY MASS INDEX: 48.21 KG/M2 | HEART RATE: 100 BPM | HEIGHT: 62 IN | OXYGEN SATURATION: 98 % | WEIGHT: 262 LBS | DIASTOLIC BLOOD PRESSURE: 86 MMHG | SYSTOLIC BLOOD PRESSURE: 144 MMHG

## 2023-03-27 DIAGNOSIS — Z86.2 HISTORY OF IRON DEFICIENCY ANEMIA: Primary | ICD-10-CM

## 2023-03-27 DIAGNOSIS — N92.0 MENORRHAGIA WITH REGULAR CYCLE: ICD-10-CM

## 2023-03-27 NOTE — PROGRESS NOTES
[address] [facility] [address] [address] [phone]  Hematology Ambulatory Follow-Up  Bill Dan, 1980, [id number]  3/27/2023    Assessment/Plan:    1  History of iron deficiency anemia  2  Menorrhagia with regular cycle   Patient is a 42-year-old female with a history of hypothyroid, psoriasis, hyperlipidemia, and menorrhagia with subsequent iron deficiency  She had previously reported periods lasting 5 to 7 days with the first 4 the heaviest   She has not seen gynecology since 2020  She was put on birth control however discontinued herself secondary to increased mood swings  She is encouraged to follow-up with them regarding heavy menses  Denies blood in her stools  She has gastroenterology in the distant past   Oral iron causes diarrhea  She has completed 3 rounds of Venofer iron infusions in October 2022  She tolerated without difficulty  Most recent iron saturation is 16% with a ferritin level of 114  CBC shows a hemoglobin of 11 9 MCV 85  Patient is currently in nursing school  We will continue to monitor every 3 months managing as indicated  She would prefer [facility] if repeat infusions are needed  I will see her in 1 year, patient verbalized understanding and is in agreement with the plan  - CBC; Standing  - Iron Panel (Includes Ferritin, Iron Sat%, Iron, and TIBC); Standing  - CBC  - Iron Panel (Includes Ferritin, Iron Sat%, Iron, and TIBC)    Barrier(s) to care: None  The patient is able to self care  [address] [facility]    Interval history: Medically stable    Review of Systems   Constitutional: Positive for fatigue  Negative for activity change, appetite change, fever and unexpected weight change  Respiratory: Negative for cough and shortness of breath  Cardiovascular: Negative for chest pain and leg swelling  Gastrointestinal: Positive for diarrhea  Negative for abdominal pain, constipation and nausea  Endocrine: Negative for cold intolerance and heat intolerance  Genitourinary: Positive for menstrual problem (Menorrhagia)  Musculoskeletal: Negative for arthralgias and myalgias  Skin: Negative  Neurological: Negative for dizziness, weakness and headaches  Hematological: Negative for adenopathy  Does not bruise/bleed easily  Past Medical History:   Diagnosis Date   • Abnormal Pap smear of cervix    • AMA (advanced maternal age) multigravida 35+    • Anemia    • Asthma    • Diabetes mellitus ([facility] [address] )     gd   • Disease of thyroid gland    • Fibroid     Was told during last pregnancy had fibroid  Asymtomtic   • Hypertension     pre ecl   • Hypothyroidism    • Kidney stone     Years ago   Had during last pregnancy   • RSV (acute bronchiolitis due to respiratory syncytial virus)    • Varicella     Patient did at child     Past Surgical History:   Procedure Laterality Date   •  SECTION  2009   •  SECTION  2012   • CHOLECYSTECTOMY  2013   • DENTAL SURGERY      [place] teeth extraction   • [place]  DELIVERY ONLY N/A 2020    Procedure:  SECTION () REPEAT;  Surgeon: Marce Arshad MD;  [facility];  Service: Obstetrics   • [place] LIGATION,FALLOPIAN TUBE W/ Bilateral 2020    Procedure: LIGATION/COAGULATION TUBAL;  Surgeon: Marce Arshad MD;  Location[facility];  Service: Obstetrics       Current Outpatient Medications:   •  albuterol (2 5 mg/3 mL) 0 083 % nebulizer solution, Take 3 mL (2 5 mg total) by nebulization every 6 (six) hours as needed for wheezing or shortness of breath, Disp: 75 mL, Rfl: 0  •  albuterol (PROVENTIL HFA,VENTOLIN HFA) 90 mcg/act inhaler, Inhale 2 puffs every 4 (four) hours as needed for wheezing or shortness of breath, Disp: 8 g, Rfl: 0  •  betamethasone dipropionate (DIPROSONE) 0 05 % cream, Apply topically 2 (two) "times a day, Disp: 45 g, Rfl: 0  •  fluticasone (FLONASE) 50 mcg/act nasal spray, 1 spray into each nostril daily, Disp: 16 g, Rfl: 0  •  levothyroxine 75 mcg tablet, Take 1 tablet (75 mcg total) by mouth daily, Disp: 90 tablet, Rfl: 2  •  mometasone (ELOCON) 0 1 % cream, Apply topically daily, Disp: 45 g, Rfl: 2    Allergies   Allergen Reactions   • Other Allergic Rhinitis     Seasonal  Grass     Objective:  /86   Pulse 100   Temp (!) 96 7 °F (35 9 °C)   Resp 22   Ht 5' 2\" (1 575 m)   Wt 119 kg (262 lb)   LMP 03/15/2023 (Approximate)   SpO2 98%   BMI 47 92 kg/m²    Physical Exam  Vitals reviewed  Constitutional:       Appearance: Normal appearance  She is well-developed  HENT:      Head: Normocephalic and atraumatic  Eyes:      Conjunctiva/sclera: Conjunctivae normal       Pupils: Pupils are equal, round, and reactive to light  Pulmonary:      Effort: Pulmonary effort is normal  No respiratory distress  Musculoskeletal:         General: Normal range of motion  Cervical back: Normal range of motion  Lymphadenopathy:      Cervical: No cervical adenopathy  Skin:     General: Skin is dry  Neurological:      Mental Status: She is alert and oriented to person, place, and time  Psychiatric:         Behavior: Behavior normal      Please note: This report has been generated by a voice recognition software system  Therefore there may be syntax, spelling, and/or grammatical errors  Please call if you have any questions    "

## 2023-04-04 ENCOUNTER — OFFICE VISIT (OUTPATIENT)
Dept: URGENT CARE | Facility: CLINIC | Age: 43
End: 2023-04-04

## 2023-04-04 VITALS
TEMPERATURE: 98.6 F | SYSTOLIC BLOOD PRESSURE: 137 MMHG | OXYGEN SATURATION: 98 % | RESPIRATION RATE: 18 BRPM | DIASTOLIC BLOOD PRESSURE: 85 MMHG | HEART RATE: 120 BPM

## 2023-04-04 DIAGNOSIS — J02.0 STREP PHARYNGITIS: Primary | ICD-10-CM

## 2023-04-04 DIAGNOSIS — J02.9 ACUTE PHARYNGITIS, UNSPECIFIED ETIOLOGY: ICD-10-CM

## 2023-04-04 LAB — S PYO AG THROAT QL: NEGATIVE

## 2023-04-04 NOTE — PROGRESS NOTES
[facility]        NAME: Ene Lyons is a 37 y o  female  : 1980    MRN: [id number]  DATE: 2023  TIME: 12:43 PM    Assessment and Plan   Strep pharyngitis [J02 0]  1  Strep pharyngitis        2  Acute pharyngitis, unspecified etiology  POCT rapid strepA    Throat culture        - Rapid strep negative  - Pending cultute - Positive for strep  - PCN VK sent to pharmacy  - Take ABX as directed  - Throw away old tooth brush    Patient Instructions   - Take Tylenol or Motrin as needed  - Recommend Menthol throat lozenges  - Recommend Decongestant  - PCN VK sent to pharmacy  - Take ABX as directed  - Throw away old tooth brush  Follow up with PCP in 3-5 days  Proceed to  ER if symptoms worsen  Chief Complaint     Chief Complaint   Patient presents with   • Sore Throat     Pt reports sore throat starting today, s/o tested positive for strep via throat culture  History of Present Illness       36 y/o F presents for sore throat x 1 day  Pt admits someone she's around tested positive for strep via throat culture  Would like ABX  Review of Systems   Review of Systems   Constitutional: Positive for fever  Negative for chills  HENT: Positive for congestion, ear pain and sore throat  Negative for ear discharge, sinus pressure and sinus pain  Eyes: Negative for pain  Respiratory: Positive for cough            Current Medications       Current Outpatient Medications:   •  albuterol (2 5 mg/3 mL) 0 083 % nebulizer solution, Take 3 mL (2 5 mg total) by nebulization every 6 (six) hours as needed for wheezing or shortness of breath, Disp: 75 mL, Rfl: 0  •  albuterol (PROVENTIL HFA,VENTOLIN HFA) 90 mcg/act inhaler, Inhale 2 puffs every 4 (four) hours as needed for wheezing or shortness of breath, Disp: 8 g, Rfl: 0  •  betamethasone dipropionate (DIPROSONE) 0 05 % cream, Apply topically 2 (two) times a day, Disp: 45 g, Rfl: 0  •  fluticasone (FLONASE) 50 mcg/act nasal spray, 1 spray into each nostril daily, Disp: 16 g, Rfl: 0  •  levothyroxine 75 mcg tablet, TAKE 1 TABLET BY MOUTH EVERY DAY, Disp: 30 tablet, Rfl: 8  •  mometasone (ELOCON) 0 1 % cream, Apply topically daily, Disp: 45 g, Rfl: 2    Current Allergies     Allergies as of 2023 - Reviewed 2023   Allergen Reaction Noted   • Other Allergic Rhinitis 2019            The following portions of the patient's history were reviewed and updated as appropriate: allergies, current medications, past family history, past medical history, past social history, past surgical history and problem list      Past Medical History:   Diagnosis Date   • Abnormal Pap smear of cervix    • AMA (advanced maternal age) multigravida 35+    • Anemia    • Asthma    • Diabetes mellitus ([facility] [address] )     gd   • Disease of thyroid gland    • Fibroid     Was told during last pregnancy had fibroid  Asymtomtic   • Hypertension     pre ecl   • Hypothyroidism    • Kidney stone     Years ago   Had during last pregnancy   • RSV (acute bronchiolitis due to respiratory syncytial virus)    • Varicella     Patient did at child       Past Surgical History:   Procedure Laterality Date   •  SECTION  2009   •  SECTION  2012   • CHOLECYSTECTOMY  2013   • DENTAL SURGERY      [place] teeth extraction   • RI  DELIVERY ONLY N/A 2020    Procedure:  SECTION () REPEAT;  Surgeon: Merritt Adam MD;  [facility]: [facility];  Service: Obstetrics   • RI LIG/TRNSXJ FALOPIAN TUBE  DEL/ABDML SURG Bilateral 2020    Procedure: LIGATION/COAGULATION TUBAL;  Surgeon: Merritt Adam MD;  Location: [facility];  Service: Obstetrics       Family History   Problem Relation Age of Onset   • COPD Mother    • Heart disease Mother    • Alcohol abuse Father    • Depression Father    • Drug abuse Father    • Other Father         seizures, HIV   • Depression Sister    • Other Sister         seizures,  ptsd   • Mental illness Sister anx   • Depression Daughter    • Other Daughter         seizures   • Mental illness Daughter         anx   • No Known Problems Daughter    • Other Maternal Grandmother         parkinsons   • No Known Problems Maternal Grandfather    • Other Paternal Grandmother         hypothyroid   • No Known Problems Paternal Grandfather    • Depression Brother    • Mental illness Brother         anx, bipolar   • Mental illness Son         add, adhd, anx   • No Known Problems Half-Sister    • No Known Problems Half-Sister    • Diabetes Half-Brother         type 2         Medications have been verified  Objective   /85   Pulse (!) 120   Temp 98 6 °F (37 °C)   Resp 18   LMP 03/15/2023 (Approximate)   SpO2 98%   Patient's last menstrual period was 03/15/2023 (approximate)  Physical Exam     Physical Exam  Vitals and nursing note reviewed  Constitutional:       General: She is not in acute distress  Appearance: She is not toxic-appearing  HENT:      Head: Normocephalic and atraumatic  Right Ear: Tympanic membrane and ear canal normal  No tenderness  No middle ear effusion  Tympanic membrane is not erythematous  Left Ear: Tympanic membrane and ear canal normal  No tenderness  No middle ear effusion  Tympanic membrane is not erythematous  Nose: No congestion  Mouth/Throat:      Mouth: Mucous membranes are moist  No oral lesions  Pharynx: No oropharyngeal exudate, posterior oropharyngeal erythema or uvula swelling  Tonsils: No tonsillar exudate or tonsillar abscesses  Eyes:      Conjunctiva/sclera: Conjunctivae normal    Pulmonary:      Effort: Pulmonary effort is normal  No respiratory distress  Lymphadenopathy:      Cervical: No cervical adenopathy  Neurological:      Mental Status: She is alert

## 2023-04-05 DIAGNOSIS — E03.9 HYPOTHYROIDISM, UNSPECIFIED TYPE: ICD-10-CM

## 2023-04-05 RX ORDER — LEVOTHYROXINE SODIUM 0.07 MG/1
TABLET ORAL
Qty: 30 TABLET | Refills: 8 | Status: SHIPPED | OUTPATIENT
Start: 2023-04-05

## 2023-04-06 ENCOUNTER — TELEPHONE (OUTPATIENT)
Dept: URGENT CARE | Facility: CLINIC | Age: 43
End: 2023-04-06

## 2023-04-06 LAB — BACTERIA THROAT CULT: ABNORMAL

## 2023-04-06 RX ORDER — PENICILLIN V POTASSIUM 500 MG/1
500 TABLET ORAL 2 TIMES DAILY
Qty: 20 TABLET | Refills: 0 | Status: SHIPPED | OUTPATIENT
Start: 2023-04-06 | End: 2023-04-16

## 2023-04-06 NOTE — TELEPHONE ENCOUNTER
CERTIFICATE OF     October 27, 2021      Re:   Kip Pineda   [address] [place] [address]                        This is to certify that Kip Pineda has been under my care from 10/27/2021 and can return to  on Thursday, 10/28/2021.      REMARKS: Left middle ear infection with fever.         SIGNATURE:Tomeka Rodriguez D.O., 10/27/2021      [address] [place] [id number] [phone]   Pt called requesting her results  Informed that the culture was positive and that PCN had been sent in  She would like something for cough so I let the provider know  All questions answered

## 2023-04-28 DIAGNOSIS — E03.9 HYPOTHYROIDISM, UNSPECIFIED TYPE: ICD-10-CM

## 2023-04-28 RX ORDER — LEVOTHYROXINE SODIUM 0.07 MG/1
TABLET ORAL
Qty: 90 TABLET | Refills: 3 | Status: SHIPPED | OUTPATIENT
Start: 2023-04-28

## 2023-06-06 DIAGNOSIS — Z11.1 SCREENING-PULMONARY TB: Primary | ICD-10-CM

## 2023-06-11 ENCOUNTER — HOSPITAL ENCOUNTER (EMERGENCY)
Facility: HOSPITAL | Age: 43
Discharge: HOME | End: 2023-06-11
Attending: EMERGENCY MEDICINE
Payer: COMMERCIAL

## 2023-06-11 ENCOUNTER — APPOINTMENT (OUTPATIENT)
Dept: RADIOLOGY | Facility: HOSPITAL | Age: 43
End: 2023-06-11
Payer: COMMERCIAL

## 2023-06-11 VITALS
RESPIRATION RATE: 16 BRPM | DIASTOLIC BLOOD PRESSURE: 77 MMHG | TEMPERATURE: 98.1 F | OXYGEN SATURATION: 98 % | HEIGHT: 62 IN | WEIGHT: 262 LBS | BODY MASS INDEX: 48.21 KG/M2 | HEART RATE: 93 BPM | SYSTOLIC BLOOD PRESSURE: 145 MMHG

## 2023-06-11 DIAGNOSIS — N39.0 URINARY TRACT INFECTION: ICD-10-CM

## 2023-06-11 DIAGNOSIS — R42 DIZZINESS: Primary | ICD-10-CM

## 2023-06-11 DIAGNOSIS — R21 RASH AND NONSPECIFIC SKIN ERUPTION: ICD-10-CM

## 2023-06-11 LAB
2HR DELTA HS TROPONIN: -1 NG/L
ALBUMIN SERPL BCP-MCNC: 3.9 G/DL (ref 3.5–5)
ALP SERPL-CCNC: 80 U/L (ref 34–104)
ALT SERPL W P-5'-P-CCNC: 15 U/L (ref 7–52)
ANION GAP SERPL CALCULATED.3IONS-SCNC: 9 MMOL/L (ref 4–13)
AST SERPL W P-5'-P-CCNC: 14 U/L (ref 13–39)
BACTERIA UR QL AUTO: ABNORMAL /HPF
BASOPHILS # BLD AUTO: 0.03 THOUSANDS/ÂΜL (ref 0–0.1)
BASOPHILS NFR BLD AUTO: 0 % (ref 0–1)
BILIRUB SERPL-MCNC: 0.41 MG/DL (ref 0.2–1)
BILIRUB UR QL STRIP: NEGATIVE
BUN SERPL-MCNC: 11 MG/DL (ref 5–25)
CALCIUM SERPL-MCNC: 9.3 MG/DL (ref 8.4–10.2)
CARDIAC TROPONIN I PNL SERPL HS: 2 NG/L
CARDIAC TROPONIN I PNL SERPL HS: 3 NG/L
CHLORIDE SERPL-SCNC: 106 MMOL/L (ref 96–108)
CLARITY UR: ABNORMAL
CO2 SERPL-SCNC: 23 MMOL/L (ref 21–32)
COLOR UR: YELLOW
CREAT SERPL-MCNC: 0.65 MG/DL (ref 0.6–1.3)
EOSINOPHIL # BLD AUTO: 0.26 THOUSAND/ÂΜL (ref 0–0.61)
EOSINOPHIL NFR BLD AUTO: 2 % (ref 0–6)
ERYTHROCYTE [DISTWIDTH] IN BLOOD BY AUTOMATED COUNT: 14.9 % (ref 11.6–15.1)
EXT PREGNANCY TEST URINE: NEGATIVE
EXT. CONTROL: NORMAL
GFR SERPL CREATININE-BSD FRML MDRD: 109 ML/MIN/1.73SQ M
GLUCOSE SERPL-MCNC: 111 MG/DL (ref 65–140)
GLUCOSE UR STRIP-MCNC: NEGATIVE MG/DL
HCT VFR BLD AUTO: 39.3 % (ref 34.8–46.1)
HGB BLD-MCNC: 11.9 G/DL (ref 11.5–15.4)
HGB UR QL STRIP.AUTO: NEGATIVE
IMM GRANULOCYTES # BLD AUTO: 0.04 THOUSAND/UL (ref 0–0.2)
IMM GRANULOCYTES NFR BLD AUTO: 0 % (ref 0–2)
KETONES UR STRIP-MCNC: ABNORMAL MG/DL
LEUKOCYTE ESTERASE UR QL STRIP: ABNORMAL
LIPASE SERPL-CCNC: 18 U/L (ref 11–82)
LYMPHOCYTES # BLD AUTO: 1.38 THOUSANDS/ÂΜL (ref 0.6–4.47)
LYMPHOCYTES NFR BLD AUTO: 13 % (ref 14–44)
MCH RBC QN AUTO: 25.4 PG (ref 26.8–34.3)
MCHC RBC AUTO-ENTMCNC: 30.3 G/DL (ref 31.4–37.4)
MCV RBC AUTO: 84 FL (ref 82–98)
MONOCYTES # BLD AUTO: 0.64 THOUSAND/ÂΜL (ref 0.17–1.22)
MONOCYTES NFR BLD AUTO: 6 % (ref 4–12)
NEUTROPHILS # BLD AUTO: 8.44 THOUSANDS/ÂΜL (ref 1.85–7.62)
NEUTS SEG NFR BLD AUTO: 79 % (ref 43–75)
NITRITE UR QL STRIP: NEGATIVE
NON-SQ EPI CELLS URNS QL MICRO: ABNORMAL /HPF
NRBC BLD AUTO-RTO: 0 /100 WBCS
PH UR STRIP.AUTO: 7 [PH]
PLATELET # BLD AUTO: 276 THOUSANDS/UL (ref 149–390)
PMV BLD AUTO: 10.4 FL (ref 8.9–12.7)
POTASSIUM SERPL-SCNC: 3.7 MMOL/L (ref 3.5–5.3)
PROT SERPL-MCNC: 7.7 G/DL (ref 6.4–8.4)
PROT UR STRIP-MCNC: ABNORMAL MG/DL
RBC # BLD AUTO: 4.69 MILLION/UL (ref 3.81–5.12)
RBC #/AREA URNS AUTO: ABNORMAL /HPF
SODIUM SERPL-SCNC: 138 MMOL/L (ref 135–147)
SP GR UR STRIP.AUTO: 1.02 (ref 1–1.03)
UROBILINOGEN UR STRIP-ACNC: <2 MG/DL
WBC # BLD AUTO: 10.79 THOUSAND/UL (ref 4.31–10.16)
WBC #/AREA URNS AUTO: ABNORMAL /HPF

## 2023-06-11 PROCEDURE — 36415 COLL VENOUS BLD VENIPUNCTURE: CPT

## 2023-06-11 PROCEDURE — 84484 ASSAY OF TROPONIN QUANT: CPT | Performed by: EMERGENCY MEDICINE

## 2023-06-11 PROCEDURE — 71046 X-RAY EXAM CHEST 2 VIEWS: CPT

## 2023-06-11 PROCEDURE — 93005 ELECTROCARDIOGRAM TRACING: CPT

## 2023-06-11 PROCEDURE — 81001 URINALYSIS AUTO W/SCOPE: CPT

## 2023-06-11 PROCEDURE — 99284 EMERGENCY DEPT VISIT MOD MDM: CPT

## 2023-06-11 PROCEDURE — 96360 HYDRATION IV INFUSION INIT: CPT

## 2023-06-11 PROCEDURE — 83690 ASSAY OF LIPASE: CPT

## 2023-06-11 PROCEDURE — 87086 URINE CULTURE/COLONY COUNT: CPT

## 2023-06-11 PROCEDURE — 81025 URINE PREGNANCY TEST: CPT

## 2023-06-11 PROCEDURE — 85025 COMPLETE CBC W/AUTO DIFF WBC: CPT | Performed by: EMERGENCY MEDICINE

## 2023-06-11 PROCEDURE — 80053 COMPREHEN METABOLIC PANEL: CPT | Performed by: EMERGENCY MEDICINE

## 2023-06-11 RX ORDER — CEPHALEXIN 500 MG/1
500 CAPSULE ORAL EVERY 6 HOURS SCHEDULED
Qty: 20 CAPSULE | Refills: 0 | Status: SHIPPED | OUTPATIENT
Start: 2023-06-11 | End: 2023-06-16

## 2023-06-11 RX ORDER — CLOTRIMAZOLE 1 %
CREAM (GRAM) TOPICAL
Qty: 15 G | Refills: 0 | Status: SHIPPED | OUTPATIENT
Start: 2023-06-11

## 2023-06-11 RX ORDER — MECLIZINE HCL 12.5 MG/1
12.5 TABLET ORAL 3 TIMES DAILY PRN
Qty: 30 TABLET | Refills: 0 | Status: SHIPPED | OUTPATIENT
Start: 2023-06-11

## 2023-06-11 RX ORDER — MECLIZINE HCL 12.5 MG/1
12.5 TABLET ORAL ONCE
Status: COMPLETED | OUTPATIENT
Start: 2023-06-11 | End: 2023-06-11

## 2023-06-11 RX ORDER — ACETAMINOPHEN 325 MG/1
975 TABLET ORAL ONCE
Status: COMPLETED | OUTPATIENT
Start: 2023-06-11 | End: 2023-06-11

## 2023-06-11 RX ADMIN — ACETAMINOPHEN 975 MG: 325 TABLET, FILM COATED ORAL at 17:57

## 2023-06-11 RX ADMIN — SODIUM CHLORIDE 1000 ML: 0.9 INJECTION, SOLUTION INTRAVENOUS at 17:37

## 2023-06-11 RX ADMIN — MECLIZINE 12.5 MG: 12.5 TABLET ORAL at 17:31

## 2023-06-11 NOTE — Clinical Note
Dora Charlie was seen and treated in our emergency department on 6/11/2023  Diagnosis:     Sarahann Hammans  may return to work on return date  She may return on this date: 06/13/2023         If you have any questions or concerns, please don't hesitate to call        Makayla Vasquez PA-C    ______________________________           _______________          _______________  Hospital Representative                              Date                                Time

## 2023-06-11 NOTE — ED PROVIDER NOTES
History  Chief Complaint   Patient presents with   • Dizziness     This is a 38 y/o female with PMH hypothyroidism who presents to the ER today for multiple complaints  Patient states she has random bruising on her abdomen that she is unsure of the cause and a red itchy rash on lower abdomen and left arm  Also admits to room spinning sensation when she moves her head or closes her eyes, but stops with walking and sitting still that started today  She denies any headache, chest pain, shortness of breath, palpitations, URI symptoms, cough, fevers, abdominal pain, nausea, vomiting  States she took zyrtec for the rash with no relief  Never had this before  No other people with similar rash  No recent travel  History provided by:  Patient   used: No    Dizziness  Quality:  Room spinning  Severity:  Mild  Timing:  Intermittent  Progression:  Waxing and waning  Chronicity:  New  Context: head movement    Ineffective treatments:  None tried  Associated symptoms: no chest pain, no headaches, no nausea, no shortness of breath, no vision changes, no vomiting and no weakness        Prior to Admission Medications   Prescriptions Last Dose Informant Patient Reported? Taking?    albuterol (2 5 mg/3 mL) 0 083 % nebulizer solution   No No   Sig: Take 3 mL (2 5 mg total) by nebulization every 6 (six) hours as needed for wheezing or shortness of breath   albuterol (PROVENTIL HFA,VENTOLIN HFA) 90 mcg/act inhaler   No No   Sig: Inhale 2 puffs every 4 (four) hours as needed for wheezing or shortness of breath   betamethasone dipropionate (DIPROSONE) 0 05 % cream   No No   Sig: Apply topically 2 (two) times a day   fluticasone (FLONASE) 50 mcg/act nasal spray   No No   Si spray into each nostril daily   levothyroxine 75 mcg tablet   No No   Sig: TAKE 1 TABLET BY MOUTH EVERY DAY   mometasone (ELOCON) 0 1 % cream   No No   Sig: APPLY TO AFFECTED AREA TOPICALLY EVERY DAY      Facility-Administered Medications: None       Past Medical History:   Diagnosis Date   • Abnormal Pap smear of cervix    • AMA (advanced maternal age) multigravida 35+    • Anemia    • Asthma    • Diabetes mellitus ([facility] [address] )     gd   • Disease of thyroid gland    • Fibroid     Was told during last pregnancy had fibroid  Asymtomtic   • Hypertension     pre ecl   • Hypothyroidism    • Kidney stone     Years ago   Had during last pregnancy   • RSV (acute bronchiolitis due to respiratory syncytial virus)    • Varicella     Patient did at child       Past Surgical History:   Procedure Laterality Date   •  SECTION  2009   •  SECTION  2012   • CHOLECYSTECTOMY  2013   • DENTAL SURGERY      [place] teeth extraction   • [place]  DELIVERY ONLY N/A 2020    Procedure:  SECTION () REPEAT;  Surgeon: Jumana Barrett MD;  [facility]: [facility];  Service: Obstetrics   • [place] LIG/TRNSXJ FALOPIAN TUBE  DEL/ABDML SURG Bilateral 2020    Procedure: LIGATION/COAGULATION TUBAL;  Surgeon: Jumana Barrett MD;  Location: [facility];  Service: Obstetrics       Family History   Problem Relation Age of Onset   • COPD Mother    • Heart disease Mother    • Alcohol abuse Father    • Depression Father    • Drug abuse Father    • Other Father         seizures, HIV   • Depression Sister    • Other Sister         seizures,  ptsd   • Mental illness Sister         anx   • Depression Daughter    • Other Daughter         seizures   • Mental illness Daughter         anx   • No Known Problems Daughter    • Other Maternal Grandmother         parkinsons   • No Known Problems Maternal Grandfather    • Other Paternal Grandmother         hypothyroid   • No Known Problems Paternal Grandfather    • Depression Brother    • Mental illness Brother         anx, bipolar   • Mental illness Son         add, adhd, anx   • No Known Problems Half-Sister    • No Known Problems Half-Sister    • Diabetes Half-Brother         type 2     I have reviewed and agree with the history as documented  E-Cigarette/Vaping   • E-Cigarette Use Never User      E-Cigarette/Vaping Substances   • Nicotine No    • THC No    • CBD No    • Flavoring No    • Other No    • Unknown No      Social History     Tobacco Use   • Smoking status: Never   • Smokeless tobacco: Never   Vaping Use   • Vaping Use: Never used   Substance Use Topics   • Alcohol use: Not Currently     Comment: not currently   • Drug use: No       Review of Systems   Constitutional: Negative for chills and fever  HENT: Negative for congestion, rhinorrhea and sore throat  Respiratory: Negative for chest tightness and shortness of breath  Cardiovascular: Negative for chest pain  Gastrointestinal: Negative for abdominal pain, nausea and vomiting  Skin: Positive for rash  Negative for color change  Neurological: Positive for dizziness  Negative for facial asymmetry, weakness, numbness and headaches  Psychiatric/Behavioral: Negative for behavioral problems and sleep disturbance  All other systems reviewed and are negative  Physical Exam  Physical Exam  Vitals and nursing note reviewed  Constitutional:       General: She is awake  Appearance: Normal appearance  She is well-developed  HENT:      Head: Normocephalic and atraumatic  Right Ear: External ear normal       Left Ear: External ear normal       Nose: Nose normal    Eyes:      General: No scleral icterus  Extraocular Movements: Extraocular movements intact  Cardiovascular:      Rate and Rhythm: Normal rate and regular rhythm  Heart sounds: Normal heart sounds, S1 normal and S2 normal  No murmur heard  No gallop  Pulmonary:      Effort: Pulmonary effort is normal       Breath sounds: Normal breath sounds  No wheezing, rhonchi or rales  Abdominal:      General: Abdomen is protuberant  Bowel sounds are normal       Palpations: Abdomen is soft  Tenderness: There is no abdominal tenderness   There is no guarding or rebound  Comments: Dispersed bruises on upper abdomen and erythematous papular rash on lower abdomen  See photo below    Musculoskeletal:         General: Normal range of motion  Cervical back: Full passive range of motion without pain and normal range of motion  No rigidity  Skin:     General: Skin is warm and dry  Neurological:      General: No focal deficit present  Mental Status: She is alert and oriented to person, place, and time  GCS: GCS eye subscore is 4  GCS verbal subscore is 5  GCS motor subscore is 6  Cranial Nerves: No facial asymmetry  Sensory: Sensation is intact  Motor: Motor function is intact  No pronator drift  Coordination: Finger-Nose-Finger Test and Heel to [place] shelby Test normal  Rapid alternating movements normal       Gait: Gait is intact  Psychiatric:         Attention and Perception: Attention and perception normal          Mood and Affect: Mood normal          Behavior: Behavior normal  Behavior is cooperative               Vital Signs  ED Triage Vitals   Temperature Pulse Respirations Blood Pressure SpO2   06/11/23 1548 06/11/23 1548 06/11/23 1548 06/11/23 1540 06/11/23 1548   98 1 °F (36 7 °C) (!) 109 16 146/94 98 %      Temp src Heart Rate Source Patient Position - Orthostatic VS BP Location FiO2 (%)   -- 06/11/23 1548 06/11/23 1750 06/11/23 1750 --    Monitor Sitting Right arm       Pain Score       06/11/23 1750       3           Vitals:    06/11/23 1540 06/11/23 1548 06/11/23 1750 06/11/23 1803   BP: 146/94  145/77    Pulse:  (!) 109 96 93   Patient Position - Orthostatic VS:   Sitting          Visual Acuity  Visual Acuity    Flowsheet Row Most Recent Value   L Pupil Size (mm) 3   R Pupil Size (mm) 3          ED Medications  Medications   sodium chloride 0 9 % bolus 1,000 mL (0 mL Intravenous Stopped 6/11/23 1900)   meclizine (ANTIVERT) tablet 12 5 mg (12 5 mg Oral Given 6/11/23 1731)   acetaminophen (TYLENOL) tablet 975 mg (975 mg Oral Given 6/11/23 1757)       Diagnostic Studies  Results Reviewed     Procedure Component Value Units Date/Time    Urine Microscopic [[id number]]  (Abnormal) Collected: 06/11/23 1758    Lab Status: Final result Specimen: Urine, Clean Catch Updated: 06/11/23 1814     RBC, UA       Field obscured, unable to enumerate     /hpf     WBC, UA       Field obscured, unable to enumerate     /hpf     Epithelial Cells Innumerable /hpf      Bacteria, UA       Field obscured, unable to enumerate     /hpf    Urine culture [[id number]] Collected: 06/11/23 1758    Lab Status:  In process Specimen: Urine, Clean Catch Updated: 06/11/23 1814    HS Troponin I 2hr [[id number]]  (Normal) Collected: 06/11/23 1737    Lab Status: Final result Specimen: Blood from Arm, Left Updated: 06/11/23 1811     hs TnI 2hr 2 ng/L      Delta 2hr hsTnI -1 ng/L     UA w Reflex to Microscopic w Reflex to Culture [[id number]]  (Abnormal) Collected: 06/11/23 1758    Lab Status: Final result Specimen: Urine, Clean Catch Updated: 06/11/23 1811     Color, UA Yellow     Clarity, UA Cloudy     Specific [place], UA 1 020     pH, UA 7 0     Leukocytes, UA Trace     Nitrite, UA Negative     Protein, UA Trace mg/dl      Glucose, UA Negative mg/dl      Ketones, UA Trace mg/dl      Urobilinogen, UA <2 0 mg/dl      Bilirubin, UA Negative     Occult Blood, UA Negative    POCT pregnancy, urine [[id number]]  (Normal) Resulted: 06/11/23 1800    Lab Status: Final result Updated: 06/11/23 1800     EXT Preg Test, Ur Negative     Control Valid    Lipase [[id number]]  (Normal) Collected: 06/11/23 1557    Lab Status: Final result Specimen: Blood from Arm, Right Updated: 06/11/23 1742     Lipase 18 u/L     Comprehensive metabolic panel [[id number]] Collected: 06/11/23 1557    Lab Status: Final result Specimen: Blood from Arm, Right Updated: 06/11/23 1711     Sodium 138 mmol/L      Potassium 3 7 mmol/L      Chloride 106 mmol/L      CO2 23 mmol/L      ANION GAP 9 mmol/L      BUN 11 mg/dL Creatinine 0 65 mg/dL      Glucose 111 mg/dL      Calcium 9 3 mg/dL      AST 14 U/L      ALT 15 U/L      Alkaline Phosphatase 80 U/L      Total Protein 7 7 g/dL      Albumin 3 9 g/dL      Total Bilirubin 0 41 mg/dL      eGFR 109 ml/min/1 73sq m     Narrative:      [place] guidelines for Chronic Kidney Disease (CKD):   •  Stage 1 with normal or high GFR (GFR > 90 mL/min/1 73 square meters)  •  Stage 2 Mild CKD (GFR = 60-89 mL/min/1 73 square meters)  •  Stage 3A Moderate CKD (GFR = 45-59 mL/min/1 73 square meters)  •  Stage 3B Moderate CKD (GFR = 30-44 mL/min/1 73 square meters)  •  Stage 4 Severe CKD (GFR = 15-29 mL/min/1 73 square meters)  •  Stage 5 End Stage CKD (GFR <15 mL/min/1 73 square meters)  Note: GFR calculation is accurate only with a steady state creatinine    HS Troponin 0hr (reflex protocol) [[id number]]  (Normal) Collected: 06/11/23 1557    Lab Status: Final result Specimen: Blood from Arm, Right Updated: 06/11/23 1627     hs TnI 0hr 3 ng/L     CBC and differential [[id number]]  (Abnormal) Collected: 06/11/23 1557    Lab Status: Final result Specimen: Blood from Arm, Right Updated: 06/11/23 1603     WBC 10 79 Thousand/uL      RBC 4 69 Million/uL      Hemoglobin 11 9 g/dL      Hematocrit 39 3 %      MCV 84 fL      MCH 25 4 pg      MCHC 30 3 g/dL      RDW 14 9 %      MPV 10 4 fL      Platelets 147 Thousands/uL      nRBC 0 /100 WBCs      Neutrophils Relative 79 %      Immat GRANS % 0 %      Lymphocytes Relative 13 %      Monocytes Relative 6 %      Eosinophils Relative 2 %      Basophils Relative 0 %      Neutrophils Absolute 8 44 Thousands/µL      Immature Grans Absolute 0 04 Thousand/uL      Lymphocytes Absolute 1 38 Thousands/µL      Monocytes Absolute 0 64 Thousand/µL      Eosinophils Absolute 0 26 Thousand/µL      Basophils Absolute 0 03 Thousands/µL                  XR chest pa & lateral   ED Interpretation by Nohemi Sargent PA-C (06/11 1904)   No acute cardiopulmonary disease  Similar to CXR 11/15/2022                 Procedures  ECG 12 Lead Documentation Only    Date/Time: 6/11/2023 3:58 PM    Performed by: Cynthia Kaur PA-C  Authorized by: Cynthia Kaur PA-C    Indications / Diagnosis:  Dizziness  Patient location:  ED  Previous ECG:     Previous ECG:  Compared to current  Interpretation:     Interpretation: normal    Rate:     ECG rate:  96    ECG rate assessment: normal    Rhythm:     Rhythm: sinus rhythm    Comments:      Normal sinus rhythm no ST elevations, depressions             ED Course  ED Course as of 06/11/23 2320   Sun Jun 11, 2023   1903 Patient reports both dizziness and HA subsided after fluids, meclizine  Ready for discharge at this time  SBIRT 20yo+    Flowsheet Row Most Recent Value   Initial Alcohol Screen: US AUDIT-C     1  How often do you have a drink containing alcohol? 0 Filed at: 06/11/2023 1550   2  How many drinks containing alcohol do you have on a typical day you are drinking? 0 Filed at: 06/11/2023 1550   3a  Male UNDER 65: How often do you have five or more drinks on one occasion? 0 Filed at: 06/11/2023 1550   3b  FEMALE Any Age, or MALE 65+: How often do you have 4 or more drinks on one occassion? 0 Filed at: 06/11/2023 1550   Audit-C Score 0 Filed at: 06/11/2023 1550   NAIBLA: How many times in the past year have you    Used an illegal drug or used a prescription medication for non-medical reasons? Never Filed at: 06/11/2023 [address] Making  36 y/o female here for dizziness, bruises, rash  Differential diagnosis including but not limited to: thrombocytopenia, anemia, vertigo, ACS, arrhythmia, dehydration  Neuro exam benign, dizziness stops with movement/opening eyes, worsens with position changes, extremely low suspicion for celebellar stroke in otherwise young healthy patient  Assessment: dizziness, rash and nonspecific skin eruption, UTI  Plan: heart score of 0  symptoms resolved after medications, suspect BPPV  Prescribed fungal cream for rash  Patient admits to urinary symptoms so with obscured fields started on keflex and will follow w/ culture  Patient recommended to see PCP this week  She  was given strict return to ER precautions both verbally and in discharge papers  Patient verbalized understanding and agrees with plan  Amount and/or Complexity of Data Reviewed  Labs: ordered  Radiology: independent interpretation performed  Risk  OTC drugs  Prescription drug management  Disposition  Final diagnoses:   Dizziness   Rash and nonspecific skin eruption   Urinary tract infection     Time reflects when diagnosis was documented in both MDM as applicable and the Disposition within this note     Time User Action Codes Description Comment    6/11/2023  7:11 PM Asmita Bio [R42] Vertigo     6/11/2023  7:11 PM Colie Cerise [R42] Vertigo     6/11/2023  7:11 PM Belinda Vanesa Add [R42] Dizziness     6/11/2023  7:11 PM Belinda Vanesa Add [R21] Rash and nonspecific skin eruption     6/11/2023  7:11 PM Belinda Vanesa Add [N39 0] Urinary tract infection       ED Disposition     ED Disposition   Discharge    Condition   Stable    Date/Time   Sun Jun 11, 2023  7:11 PM    Comment   Calvin Balzarine discharge to home/self care                 Follow-up Information     Follow up With Specialties Details Why Contact Info Additional Information    Noreen Alberto MD Family Medicine Schedule an appointment as soon as possible for a visit   [address] [facility] [address] [place] [address] 25        [facility] [address] Emergency Department Emergency Medicine Go to  if you develop chest pain, shortness of breath, difficulty breathing, sudden intense headache, new numbness/weakness, facial droop, difficulty walking/speaking, feel faint or weak José Baptiste  [facility] Emergency Department, [address] [facility], Danelle Aschoff, [address]          Discharge Medication List as of 6/11/2023  7:20 PM      START taking these medications    Details   cephalexin (KEFLEX) 500 mg capsule Take 1 capsule (500 mg total) by mouth every 6 (six) hours for 5 days, Starting Sun 6/11/2023, Until Fri 6/16/2023, Normal      clotrimazole (LOTRIMIN) 1 % cream Apply to affected area 2 times daily, Normal      meclizine (ANTIVERT) 12 5 MG tablet Take 1 tablet (12 5 mg total) by mouth 3 (three) times a day as needed for dizziness, Starting Sun 6/11/2023, Normal         CONTINUE these medications which have NOT CHANGED    Details   albuterol (2 5 mg/3 mL) 0 083 % nebulizer solution Take 3 mL (2 5 mg total) by nebulization every 6 (six) hours as needed for wheezing or shortness of breath, Starting Mon 3/13/2023, Normal      albuterol (PROVENTIL HFA,VENTOLIN HFA) 90 mcg/act inhaler Inhale 2 puffs every 4 (four) hours as needed for wheezing or shortness of breath, Starting Mon 3/13/2023, Normal      betamethasone dipropionate (DIPROSONE) 0 05 % cream Apply topically 2 (two) times a day, Starting Mon 3/13/2023, Normal      fluticasone (FLONASE) 50 mcg/act nasal spray 1 spray into each nostril daily, Starting Thu 11/17/2022, Normal      levothyroxine 75 mcg tablet TAKE 1 TABLET BY MOUTH EVERY DAY, Normal      mometasone (ELOCON) 0 1 % cream APPLY TO AFFECTED AREA TOPICALLY EVERY DAY, Normal             No discharge procedures on file      PDMP Review     None          ED Provider  Electronically Signed by           Erma Leggett PA-C  06/11/23 2011

## 2023-06-11 NOTE — DISCHARGE INSTRUCTIONS
Take 500 mg keflex four times a day x 5 days   Take meclizine tablet every 4-6 hours as needed for dizziness or nausea  Apply clotrimazole cream to rash twice daily   Schedule an appointment with your PCP next week  Return to the ER if you develop chest pain, shortness of breath, difficulty breathing, sudden intense headache, new numbness/weakness, facial droop, difficulty walking/speaking, feel faint or weak

## 2023-06-13 ENCOUNTER — OFFICE VISIT (OUTPATIENT)
Dept: URGENT CARE | Facility: CLINIC | Age: 43
End: 2023-06-13
Payer: COMMERCIAL

## 2023-06-13 VITALS
WEIGHT: 260 LBS | RESPIRATION RATE: 18 BRPM | DIASTOLIC BLOOD PRESSURE: 62 MMHG | SYSTOLIC BLOOD PRESSURE: 120 MMHG | TEMPERATURE: 98.7 F | OXYGEN SATURATION: 98 % | HEART RATE: 125 BPM | BODY MASS INDEX: 47.84 KG/M2 | HEIGHT: 62 IN

## 2023-06-13 DIAGNOSIS — R05.1 ACUTE COUGH: Primary | ICD-10-CM

## 2023-06-13 LAB — BACTERIA UR CULT: NORMAL

## 2023-06-13 PROCEDURE — 99213 OFFICE O/P EST LOW 20 MIN: CPT

## 2023-06-13 RX ORDER — BENZONATATE 200 MG/1
200 CAPSULE ORAL 3 TIMES DAILY PRN
Qty: 20 CAPSULE | Refills: 0 | Status: SHIPPED | OUTPATIENT
Start: 2023-06-13

## 2023-06-13 RX ORDER — IPRATROPIUM BROMIDE AND ALBUTEROL SULFATE 2.5; .5 MG/3ML; MG/3ML
3 SOLUTION RESPIRATORY (INHALATION) ONCE
Status: COMPLETED | OUTPATIENT
Start: 2023-06-13 | End: 2023-06-13

## 2023-06-13 RX ORDER — METHYLPREDNISOLONE 4 MG/1
TABLET ORAL
Qty: 1 EACH | Refills: 0 | Status: SHIPPED | OUTPATIENT
Start: 2023-06-13

## 2023-06-13 RX ORDER — ONDANSETRON 4 MG/1
4 TABLET, FILM COATED ORAL EVERY 8 HOURS PRN
Qty: 15 TABLET | Refills: 0 | Status: SHIPPED | OUTPATIENT
Start: 2023-06-13 | End: 2023-06-18

## 2023-06-13 RX ORDER — ALBUTEROL SULFATE 90 UG/1
2 AEROSOL, METERED RESPIRATORY (INHALATION) EVERY 6 HOURS PRN
Qty: 8.5 G | Refills: 0 | Status: SHIPPED | OUTPATIENT
Start: 2023-06-13

## 2023-06-13 RX ADMIN — IPRATROPIUM BROMIDE AND ALBUTEROL SULFATE 3 ML: 2.5; .5 SOLUTION RESPIRATORY (INHALATION) at 15:57

## 2023-06-13 NOTE — PROGRESS NOTES
[facility]        NAME: Jeremi Barton is a 37 y o  female  : 1980    MRN: [id number]  DATE: 2023  TIME: 3:58 PM    Assessment and Plan   Acute cough [R05 1]  1  Acute cough  ipratropium-albuterol (DUO-NEB) 0 5-2 5 mg/3 mL inhalation solution 3 mL        - Duoneb given in office  Admits to improvement  - Vitals stable and lungs CTA B/L    Patient Instructions       Follow up with PCP in 3-5 days  Proceed to  ER if symptoms worsen  Chief Complaint     Chief Complaint   Patient presents with   • Cough     PT presents with cough and wheezing since this morning  Pt states hx of asthma  History of Present Illness       36 y/o F with PMHx of asthma presents for cough, wheezing, and slight chest tightness x 1 day  Also admits to congestion  No known sick contacts  Used Albuterol once this AM, then it ran out  Review of Systems   Review of Systems   Constitutional: Negative for chills and fever  HENT: Positive for congestion  Negative for postnasal drip, rhinorrhea and sore throat  Respiratory: Positive for cough and chest tightness  Negative for shortness of breath  Cardiovascular: Negative for chest pain           Current Medications       Current Outpatient Medications:   •  albuterol (2 5 mg/3 mL) 0 083 % nebulizer solution, Take 3 mL (2 5 mg total) by nebulization every 6 (six) hours as needed for wheezing or shortness of breath, Disp: 75 mL, Rfl: 0  •  albuterol (PROVENTIL HFA,VENTOLIN HFA) 90 mcg/act inhaler, Inhale 2 puffs every 4 (four) hours as needed for wheezing or shortness of breath, Disp: 8 g, Rfl: 0  •  betamethasone dipropionate (DIPROSONE) 0 05 % cream, Apply topically 2 (two) times a day, Disp: 45 g, Rfl: 0  •  cephalexin (KEFLEX) 500 mg capsule, Take 1 capsule (500 mg total) by mouth every 6 (six) hours for 5 days, Disp: 20 capsule, Rfl: 0  •  clotrimazole (LOTRIMIN) 1 % cream, Apply to affected area 2 times daily, Disp: 15 g, Rfl: 0  • fluticasone (FLONASE) 50 mcg/act nasal spray, 1 spray into each nostril daily, Disp: 16 g, Rfl: 0  •  levothyroxine 75 mcg tablet, TAKE 1 TABLET BY MOUTH EVERY DAY, Disp: 90 tablet, Rfl: 3  •  meclizine (ANTIVERT) 12 5 MG tablet, Take 1 tablet (12 5 mg total) by mouth 3 (three) times a day as needed for dizziness, Disp: 30 tablet, Rfl: 0  •  mometasone (ELOCON) 0 1 % cream, APPLY TO AFFECTED AREA TOPICALLY EVERY DAY, Disp: 15 g, Rfl: 0  No current facility-administered medications for this visit  Current Allergies     Allergies as of 2023 - Reviewed 2023   Allergen Reaction Noted   • Other Allergic Rhinitis 2019            The following portions of the patient's history were reviewed and updated as appropriate: allergies, current medications, past family history, past medical history, past social history, past surgical history and problem list      Past Medical History:   Diagnosis Date   • Abnormal Pap smear of cervix    • AMA (advanced maternal age) multigravida 35+    • Anemia    • Asthma    • Diabetes mellitus ([facility] [address] )     gd   • Disease of thyroid gland    • Fibroid     Was told during last pregnancy had fibroid  Asymtomtic   • Hypertension     pre ecl   • Hypothyroidism    • Kidney stone     Years ago   Had during last pregnancy   • RSV (acute bronchiolitis due to respiratory syncytial virus)    • Varicella     Patient did at child       Past Surgical History:   Procedure Laterality Date   •  SECTION  2009   •  SECTION  2012   • CHOLECYSTECTOMY  2013   • DENTAL SURGERY      [place] teeth extraction   • [place]  DELIVERY ONLY N/A 2020    Procedure:  SECTION () REPEAT;  Surgeon: Freida Rodriguez MD;  [facility];  Service: Obstetrics   • IA LIG/TRNSXJ FALOPIAN TUBE  DEL/ABDML SURG Bilateral 2020    Procedure: LIGATION/COAGULATION TUBAL;  Surgeon: Freida Rodriguez MD;  [facility];  Service: Obstetrics       Family "History   Problem Relation Age of Onset   • COPD Mother    • Heart disease Mother    • Alcohol abuse Father    • Depression Father    • Drug abuse Father    • Other Father         seizures, HIV   • Depression Sister    • Other Sister         seizures,  ptsd   • Mental illness Sister         anx   • Depression Daughter    • Other Daughter         seizures   • Mental illness Daughter         anx   • No Known Problems Daughter    • Other Maternal Grandmother         parkinsons   • No Known Problems Maternal Grandfather    • Other Paternal Grandmother         hypothyroid   • No Known Problems Paternal Grandfather    • Depression Brother    • Mental illness Brother         anx, bipolar   • Mental illness Son         add, adhd, anx   • No Known Problems Half-Sister    • No Known Problems Half-Sister    • Diabetes Half-Brother         type 2         Medications have been verified  Objective   /62   Pulse (!) 125   Temp 98 7 °F (37 1 °C)   Resp 18   Ht 5' 2\" (1 575 m)   Wt 118 kg (260 lb)   SpO2 98%   BMI 47 55 kg/m²   No LMP recorded  Physical Exam     Physical Exam  Vitals and nursing note reviewed  Constitutional:       General: She is not in acute distress  Appearance: She is not toxic-appearing  HENT:      Head: Normocephalic and atraumatic  Right Ear: Tympanic membrane, ear canal and external ear normal       Left Ear: Tympanic membrane, ear canal and external ear normal       Nose: Nose normal       Mouth/Throat:      Mouth: Mucous membranes are moist       Pharynx: No oropharyngeal exudate or posterior oropharyngeal erythema  Eyes:      Conjunctiva/sclera: Conjunctivae normal    Cardiovascular:      Rate and Rhythm: Regular rhythm  Tachycardia present  Pulmonary:      Effort: Pulmonary effort is normal  No respiratory distress  Breath sounds: Normal breath sounds  No stridor  No wheezing, rhonchi or rales  Musculoskeletal:      Cervical back: No tenderness   " Lymphadenopathy:      Cervical: No cervical adenopathy  Neurological:      Mental Status: She is alert     Psychiatric:         Mood and Affect: Mood normal          Behavior: Behavior normal

## 2023-06-13 NOTE — LETTER
June 13, 2023     Patient: Ruben Brewster   YOB: 1980   Date of Visit: 6/13/2023       To Whom it May Concern:    Ruben Brewster was seen in my clinic on 6/13/2023  She may return to work on 6/14/23   If you have any questions or concerns, please don't hesitate to call           Sincerely,          Dhiraj Berumen PA-C        CC: No Recipients

## 2023-06-14 ENCOUNTER — APPOINTMENT (OUTPATIENT)
Dept: LAB | Facility: CLINIC | Age: 43
End: 2023-06-14
Payer: COMMERCIAL

## 2023-06-14 DIAGNOSIS — R73.01 ELEVATED FASTING GLUCOSE: ICD-10-CM

## 2023-06-14 DIAGNOSIS — E03.9 HYPOTHYROIDISM, UNSPECIFIED TYPE: ICD-10-CM

## 2023-06-14 DIAGNOSIS — E78.5 HYPERLIPIDEMIA, UNSPECIFIED HYPERLIPIDEMIA TYPE: ICD-10-CM

## 2023-06-14 DIAGNOSIS — Z11.1 SCREENING-PULMONARY TB: ICD-10-CM

## 2023-06-14 LAB
ANION GAP SERPL CALCULATED.3IONS-SCNC: 3 MMOL/L (ref 4–13)
BUN SERPL-MCNC: 15 MG/DL (ref 5–25)
CALCIUM SERPL-MCNC: 9.9 MG/DL (ref 8.3–10.1)
CHLORIDE SERPL-SCNC: 110 MMOL/L (ref 96–108)
CHOLEST SERPL-MCNC: 229 MG/DL
CO2 SERPL-SCNC: 24 MMOL/L (ref 21–32)
CREAT SERPL-MCNC: 0.81 MG/DL (ref 0.6–1.3)
ERYTHROCYTE [DISTWIDTH] IN BLOOD BY AUTOMATED COUNT: 15.1 % (ref 11.6–15.1)
FERRITIN SERPL-MCNC: 60 NG/ML (ref 11–307)
GFR SERPL CREATININE-BSD FRML MDRD: 89 ML/MIN/1.73SQ M
GLUCOSE P FAST SERPL-MCNC: 110 MG/DL (ref 65–99)
HCT VFR BLD AUTO: 40.5 % (ref 34.8–46.1)
HDLC SERPL-MCNC: 64 MG/DL
HGB BLD-MCNC: 12.3 G/DL (ref 11.5–15.4)
IRON SATN MFR SERPL: 8 % (ref 15–50)
IRON SERPL-MCNC: 28 UG/DL (ref 50–170)
LDLC SERPL CALC-MCNC: 148 MG/DL (ref 0–100)
MCH RBC QN AUTO: 25.4 PG (ref 26.8–34.3)
MCHC RBC AUTO-ENTMCNC: 30.4 G/DL (ref 31.4–37.4)
MCV RBC AUTO: 84 FL (ref 82–98)
PLATELET # BLD AUTO: 315 THOUSANDS/UL (ref 149–390)
PMV BLD AUTO: 11.4 FL (ref 8.9–12.7)
POTASSIUM SERPL-SCNC: 4.4 MMOL/L (ref 3.5–5.3)
RBC # BLD AUTO: 4.85 MILLION/UL (ref 3.81–5.12)
SODIUM SERPL-SCNC: 137 MMOL/L (ref 135–147)
TIBC SERPL-MCNC: 338 UG/DL (ref 250–450)
TRIGL SERPL-MCNC: 87 MG/DL
TSH SERPL DL<=0.05 MIU/L-ACNC: 4.28 UIU/ML (ref 0.45–4.5)
WBC # BLD AUTO: 8.92 THOUSAND/UL (ref 4.31–10.16)

## 2023-06-14 PROCEDURE — 80061 LIPID PANEL: CPT

## 2023-06-14 PROCEDURE — 84443 ASSAY THYROID STIM HORMONE: CPT

## 2023-06-14 PROCEDURE — 36415 COLL VENOUS BLD VENIPUNCTURE: CPT

## 2023-06-14 PROCEDURE — 86480 TB TEST CELL IMMUN MEASURE: CPT

## 2023-06-14 PROCEDURE — 83036 HEMOGLOBIN GLYCOSYLATED A1C: CPT

## 2023-06-14 PROCEDURE — 80048 BASIC METABOLIC PNL TOTAL CA: CPT

## 2023-06-15 DIAGNOSIS — Z86.2 HISTORY OF IRON DEFICIENCY ANEMIA: Primary | ICD-10-CM

## 2023-06-15 LAB
EST. AVERAGE GLUCOSE BLD GHB EST-MCNC: 117 MG/DL
HBA1C MFR BLD: 5.7 %

## 2023-06-15 RX ORDER — SODIUM CHLORIDE 9 MG/ML
20 INJECTION, SOLUTION INTRAVENOUS ONCE
OUTPATIENT
Start: 2023-06-30

## 2023-06-16 ENCOUNTER — TELEPHONE (OUTPATIENT)
Dept: HEMATOLOGY ONCOLOGY | Facility: CLINIC | Age: 43
End: 2023-06-16

## 2023-06-16 LAB
ATRIAL RATE: 96 BPM
P AXIS: 54 DEGREES
PR INTERVAL: 130 MS
QRS AXIS: 52 DEGREES
QRSD INTERVAL: 72 MS
QT INTERVAL: 356 MS
QTC INTERVAL: 449 MS
T WAVE AXIS: 51 DEGREES
VENTRICULAR RATE: 96 BPM

## 2023-06-16 PROCEDURE — 93010 ELECTROCARDIOGRAM REPORT: CPT | Performed by: INTERNAL MEDICINE

## 2023-06-16 NOTE — TELEPHONE ENCOUNTER
----- Message from [facility] sent at 6/15/2023  9:08 PM EDT -----  Please call patient to arrange iron infusions at [place] [phone] within the next 2 two weeks     JAYLA Michelle   ----- Message -----  From: Lab, Background User  Sent: 6/14/2023   3:38 PM EDT  To: [facility]

## 2023-06-16 NOTE — TELEPHONE ENCOUNTER
What would be a preferred day of the week that would work best for your infusion appointment? Monday or Friday  Do you prefer mornings or afternoons for your appointments? 8am  Are there any days or dates that do not work for your schedule, including any upcoming vacations? Tues, weds, thurs  We are going to try our best to schedule you at the infusion center closest to your home  In the event that we are unable to what would be your next preferred infusion site or sites? [facility]    Do you have transportation to take you to all of your appointments?  yes

## 2023-06-18 LAB
GAMMA INTERFERON BACKGROUND BLD IA-ACNC: 0.02 IU/ML
M TB IFN-G BLD-IMP: NEGATIVE
M TB IFN-G CD4+ BCKGRND COR BLD-ACNC: 0 IU/ML
M TB IFN-G CD4+ BCKGRND COR BLD-ACNC: 0 IU/ML
MITOGEN IGNF BCKGRD COR BLD-ACNC: >10 IU/ML

## 2023-06-30 ENCOUNTER — HOSPITAL ENCOUNTER (OUTPATIENT)
Dept: INFUSION CENTER | Facility: HOSPITAL | Age: 43
End: 2023-06-30
Attending: INTERNAL MEDICINE
Payer: COMMERCIAL

## 2023-06-30 VITALS
TEMPERATURE: 97.2 F | SYSTOLIC BLOOD PRESSURE: 131 MMHG | RESPIRATION RATE: 20 BRPM | HEART RATE: 92 BPM | DIASTOLIC BLOOD PRESSURE: 91 MMHG | OXYGEN SATURATION: 100 %

## 2023-06-30 DIAGNOSIS — Z86.2 HISTORY OF IRON DEFICIENCY ANEMIA: Primary | ICD-10-CM

## 2023-06-30 PROCEDURE — 96365 THER/PROPH/DIAG IV INF INIT: CPT

## 2023-06-30 PROCEDURE — 96366 THER/PROPH/DIAG IV INF ADDON: CPT

## 2023-06-30 RX ORDER — SODIUM CHLORIDE 9 MG/ML
20 INJECTION, SOLUTION INTRAVENOUS ONCE
Status: CANCELLED | OUTPATIENT
Start: 2023-07-07

## 2023-06-30 RX ORDER — SODIUM CHLORIDE 9 MG/ML
20 INJECTION, SOLUTION INTRAVENOUS ONCE
Status: COMPLETED | OUTPATIENT
Start: 2023-06-30 | End: 2023-06-30

## 2023-06-30 RX ADMIN — SODIUM CHLORIDE 20 ML/HR: 9 INJECTION, SOLUTION INTRAVENOUS at 08:40

## 2023-06-30 RX ADMIN — IRON SUCROSE 300 MG: 20 INJECTION, SOLUTION INTRAVENOUS at 08:40

## 2023-06-30 NOTE — PROGRESS NOTES
Venofer infusion completed with no adverse reactions  PIV site removed, dressing CD&I  Aware of next appointment, left unit ambulatory with steady gait

## 2023-07-07 ENCOUNTER — HOSPITAL ENCOUNTER (OUTPATIENT)
Dept: INFUSION CENTER | Facility: HOSPITAL | Age: 43
End: 2023-07-07
Attending: INTERNAL MEDICINE
Payer: COMMERCIAL

## 2023-07-07 VITALS
HEART RATE: 95 BPM | TEMPERATURE: 96.1 F | OXYGEN SATURATION: 98 % | RESPIRATION RATE: 18 BRPM | SYSTOLIC BLOOD PRESSURE: 135 MMHG | DIASTOLIC BLOOD PRESSURE: 92 MMHG

## 2023-07-07 DIAGNOSIS — Z86.2 HISTORY OF IRON DEFICIENCY ANEMIA: Primary | ICD-10-CM

## 2023-07-07 PROCEDURE — 96365 THER/PROPH/DIAG IV INF INIT: CPT

## 2023-07-07 RX ORDER — SODIUM CHLORIDE 9 MG/ML
20 INJECTION, SOLUTION INTRAVENOUS ONCE
Status: COMPLETED | OUTPATIENT
Start: 2023-07-07 | End: 2023-07-07

## 2023-07-07 RX ORDER — SODIUM CHLORIDE 9 MG/ML
20 INJECTION, SOLUTION INTRAVENOUS ONCE
Status: CANCELLED | OUTPATIENT
Start: 2023-07-14

## 2023-07-07 RX ADMIN — SODIUM CHLORIDE 300 MG: 0.9 INJECTION, SOLUTION INTRAVENOUS at 08:49

## 2023-07-07 RX ADMIN — SODIUM CHLORIDE 20 ML/HR: 9 INJECTION, SOLUTION INTRAVENOUS at 08:50

## 2023-07-07 NOTE — PROGRESS NOTES
Pt tolerated treatment with no adv reactions; pt aware of next appt; left unit ambulatory with steady gait.

## 2023-07-14 ENCOUNTER — HOSPITAL ENCOUNTER (OUTPATIENT)
Dept: INFUSION CENTER | Facility: HOSPITAL | Age: 43
End: 2023-07-14
Attending: INTERNAL MEDICINE
Payer: COMMERCIAL

## 2023-07-14 VITALS
DIASTOLIC BLOOD PRESSURE: 58 MMHG | RESPIRATION RATE: 12 BRPM | SYSTOLIC BLOOD PRESSURE: 125 MMHG | TEMPERATURE: 96.7 F | OXYGEN SATURATION: 98 % | HEART RATE: 98 BPM

## 2023-07-14 DIAGNOSIS — Z86.2 HISTORY OF IRON DEFICIENCY ANEMIA: Primary | ICD-10-CM

## 2023-07-14 PROCEDURE — 96366 THER/PROPH/DIAG IV INF ADDON: CPT

## 2023-07-14 PROCEDURE — 96365 THER/PROPH/DIAG IV INF INIT: CPT

## 2023-07-14 RX ORDER — SODIUM CHLORIDE 9 MG/ML
20 INJECTION, SOLUTION INTRAVENOUS ONCE
Status: CANCELLED | OUTPATIENT
Start: 2023-07-14

## 2023-07-14 RX ORDER — SODIUM CHLORIDE 9 MG/ML
20 INJECTION, SOLUTION INTRAVENOUS ONCE
Status: COMPLETED | OUTPATIENT
Start: 2023-07-14 | End: 2023-07-14

## 2023-07-14 RX ADMIN — SODIUM CHLORIDE 300 MG: 0.9 INJECTION, SOLUTION INTRAVENOUS at 08:44

## 2023-07-14 RX ADMIN — SODIUM CHLORIDE 20 ML/HR: 9 INJECTION, SOLUTION INTRAVENOUS at 08:44

## 2023-07-14 NOTE — PROGRESS NOTES
Pt tolerated venofer infusion without complication. PIV removed. Pt has a follow up scheduled. Left ambulatory for d/c.

## 2024-03-25 ENCOUNTER — TELEPHONE (OUTPATIENT)
Dept: HEMATOLOGY ONCOLOGY | Facility: CLINIC | Age: 44
End: 2024-03-25

## 2024-03-25 NOTE — TELEPHONE ENCOUNTER
Message left on voicemail as a reminder to complete labs ordered before upcoming appointment with Bere.

## 2024-03-27 ENCOUNTER — TELEPHONE (OUTPATIENT)
Dept: HEMATOLOGY ONCOLOGY | Facility: CLINIC | Age: 44
End: 2024-03-27

## 2024-03-27 NOTE — TELEPHONE ENCOUNTER
Voicemail left for patient regarding appointment that needs to be rescheduled for 3/27  Patient given 5/8 @ 10am.  [facility] number left for questions or if patient needs to reschedule

## 2024-05-03 ENCOUNTER — TELEPHONE (OUTPATIENT)
Dept: HEMATOLOGY ONCOLOGY | Facility: CLINIC | Age: 44
End: 2024-05-03

## 2024-05-03 NOTE — TELEPHONE ENCOUNTER
Spoke to patient to remind her to complete labs before her next appointment.  She stated she did not remember scheduling this appointment and will need to cancel it.  She could not reschedule it at the moment and will call us back when she is ready.

## 2024-06-08 ENCOUNTER — HOSPITAL ENCOUNTER (EMERGENCY)
Facility: HOSPITAL | Age: 44
Discharge: HOME | End: 2024-06-08
Attending: EMERGENCY MEDICINE
Payer: COMMERCIAL

## 2024-06-08 VITALS
HEART RATE: 103 BPM | SYSTOLIC BLOOD PRESSURE: 139 MMHG | RESPIRATION RATE: 16 BRPM | DIASTOLIC BLOOD PRESSURE: 78 MMHG | OXYGEN SATURATION: 97 % | TEMPERATURE: 98 F

## 2024-06-08 DIAGNOSIS — L50.9 URTICARIA: Primary | ICD-10-CM

## 2024-06-08 PROCEDURE — 99284 EMERGENCY DEPT VISIT MOD MDM: CPT | Performed by: EMERGENCY MEDICINE

## 2024-06-08 PROCEDURE — 99282 EMERGENCY DEPT VISIT SF MDM: CPT

## 2024-06-08 RX ORDER — PREDNISONE 20 MG/1
60 TABLET ORAL ONCE
Status: COMPLETED | OUTPATIENT
Start: 2024-06-08 | End: 2024-06-08

## 2024-06-08 RX ORDER — PREDNISONE 20 MG/1
TABLET ORAL
Qty: 17 TABLET | Refills: 0 | Status: SHIPPED | OUTPATIENT
Start: 2024-06-08 | End: 2024-06-19

## 2024-06-08 RX ADMIN — PREDNISONE 60 MG: 20 TABLET ORAL at 22:40

## 2024-06-09 NOTE — DISCHARGE INSTRUCTIONS
Take prednisone as prescribed.  We gave the first dose tonight.    You can try a second-generation antihistamine such as Zyrtec, Claritin etc. every morning and Benadryl in the evening.  If needed you can also add Pepcid once a day.    Continue your other medications except for methylprednisolone.  Follow-up with your PCP on Monday.

## 2024-06-09 NOTE — ED PROVIDER NOTES
History  Chief Complaint   Patient presents with    Rash     Patient to the ED reporting rash to bilateral arms, legs and right foot x 2 days that has progressively gotten worse.  Rash to bilateral arms and legs.  She reports itching and burning sensation. No medications taken.  Patient denies any new foods, medications, or detergents.     44-year-old female with history of asthma complains of pruritic urticarial rash that started on her arms 2 days ago and now is spread throughout the body.  Patient denies new exposures.  Patient denies any evidence of recent infection specifically no sore throat, cough, rhinorrhea, nausea, vomiting, diarrhea, dysuria.  There is been noted recent travel and no one else with this type of rash.  Topical cortisone helps briefly and then symptoms return.        Prior to Admission Medications   Prescriptions Last Dose Informant Patient Reported? Taking?   albuterol (2.5 mg/3 mL) 0.083 % nebulizer solution   No No   Sig: Take 3 mL (2.5 mg total) by nebulization every 6 (six) hours as needed for wheezing or shortness of breath   albuterol (PROVENTIL HFA,VENTOLIN HFA) 90 mcg/act inhaler   No No   Sig: Inhale 2 puffs every 4 (four) hours as needed for wheezing or shortness of breath   albuterol (ProAir HFA) 90 mcg/act inhaler   No No   Sig: Inhale 2 puffs every 6 (six) hours as needed for wheezing   benzonatate (TESSALON) 200 MG capsule   No No   Sig: Take 1 capsule (200 mg total) by mouth 3 (three) times a day as needed for cough   betamethasone dipropionate (DIPROSONE) 0.05 % cream   No No   Sig: Apply topically 2 (two) times a day   clotrimazole (LOTRIMIN) 1 % cream   No No   Sig: Apply to affected area 2 times daily   fluticasone (FLONASE) 50 mcg/act nasal spray   No No   Si spray into each nostril daily   levothyroxine 75 mcg tablet   No No   Sig: TAKE 1 TABLET BY MOUTH EVERY DAY   meclizine (ANTIVERT) 12.5 MG tablet   No No   Sig: Take 1 tablet (12.5 mg total) by mouth 3 (three)  times a day as needed for dizziness   mometasone (ELOCON) 0.1 % cream   No No   Sig: APPLY TO AFFECTED AREA TOPICALLY EVERY DAY   ondansetron (ZOFRAN) 4 mg tablet   No No   Sig: Take 1 tablet (4 mg total) by mouth every 8 (eight) hours as needed for nausea or vomiting for up to 5 days      Facility-Administered Medications: None       Past Medical History:   Diagnosis Date    Abnormal Pap smear of cervix     AMA (advanced maternal age) multigravida 35+     Anemia     Asthma     Diabetes mellitus ([facility])     gd    Disease of thyroid gland     Fibroid     Was told during last pregnancy had fibroid. Asymtomtic    Hypertension     pre ecl    Hypothyroidism     Kidney stone     Years ago. Had during last pregnancy    RSV (acute bronchiolitis due to respiratory syncytial virus)     Varicella     Patient did at child       Past Surgical History:   Procedure Laterality Date     SECTION  2009     SECTION  2012    CHOLECYSTECTOMY  2013    DENTAL SURGERY      [place] teeth extraction    IN  DELIVERY ONLY N/A 2020    Procedure:  SECTION () REPEAT;  Surgeon: Bonita Albrecht MD;  [facility];  Service: Obstetrics    IN LIG/TRNSXJ FALOPIAN TUBE  DEL/ABDML SURG Bilateral 2020    Procedure: LIGATION/COAGULATION TUBAL;  Surgeon: Bonita Albrecht MD;  [facility];  Service: Obstetrics       Family History   Problem Relation Age of Onset    COPD Mother     Heart disease Mother     Alcohol abuse Father     Depression Father     Drug abuse Father     Other Father         seizures, HIV    Depression Sister     Other Sister         seizures,  ptsd    Mental illness Sister         anx    Depression Daughter     Other Daughter         seizures    Mental illness Daughter         anx    No Known Problems Daughter     Other Maternal Grandmother         parkinsons    No Known Problems Maternal Grandfather     Other Paternal Grandmother         hypothyroid    No  Known Problems Paternal Grandfather     Depression Brother     Mental illness Brother         anx, bipolar    Mental illness Son         add, adhd, anx    No Known Problems Half-Sister     No Known Problems Half-Sister     Diabetes Half-Brother         type 2     I have reviewed and agree with the history as documented.    E-Cigarette/Vaping    E-Cigarette Use Never User      E-Cigarette/Vaping Substances    Nicotine No     THC No     CBD No     Flavoring No     Other No     Unknown No      Social History     Tobacco Use    Smoking status: Never    Smokeless tobacco: Never   Vaping Use    Vaping status: Never Used   Substance Use Topics    Alcohol use: Not Currently     Comment: not currently    Drug use: No       Review of Systems   Constitutional:  Negative for fever.   Respiratory:  Negative for shortness of breath and wheezing.    Cardiovascular:  Negative for chest pain and palpitations.   Gastrointestinal:  Negative for abdominal pain, diarrhea and vomiting.   Neurological:  Negative for syncope and light-headedness.       Physical Exam  Physical Exam  Vitals and nursing note reviewed.   Constitutional:       General: She is not in acute distress.     Appearance: She is well-developed. She is obese. She is not ill-appearing or diaphoretic.   HENT:      Head: Normocephalic and atraumatic.      Right Ear: External ear normal.      Left Ear: External ear normal.      Nose: Nose normal.      Mouth/Throat:      Mouth: Mucous membranes are moist.      Pharynx: Oropharynx is clear. No oropharyngeal exudate or posterior oropharyngeal erythema.   Eyes:      General: No scleral icterus.     Conjunctiva/sclera: Conjunctivae normal.      Pupils: Pupils are equal, round, and reactive to light.   Cardiovascular:      Rate and Rhythm: Normal rate and regular rhythm.      Pulses: Normal pulses.      Heart sounds: Normal heart sounds. No murmur heard.  Pulmonary:      Effort: Pulmonary effort is normal.      Breath sounds:  Normal breath sounds. No wheezing.   Abdominal:      General: Bowel sounds are normal.      Palpations: Abdomen is soft.      Tenderness: There is no abdominal tenderness.   Musculoskeletal:         General: No tenderness. Normal range of motion.      Cervical back: Normal range of motion and neck supple.      Right lower leg: No edema.      Left lower leg: No edema.   Lymphadenopathy:      Cervical: No cervical adenopathy.   Skin:     General: Skin is warm and dry.      Capillary Refill: Capillary refill takes less than 2 seconds.      Findings: Rash (Scattered urticarial rash with dry skin.  No signs of cellulitis.  No blistering, induration, crepitance, warmth or lymphangitic streaking.) present.   Neurological:      General: No focal deficit present.      Mental Status: She is alert and oriented to person, place, and time. Mental status is at baseline.      Cranial Nerves: No cranial nerve deficit.      Coordination: Coordination normal.      Deep Tendon Reflexes: Reflexes are normal and symmetric.   Psychiatric:         Mood and Affect: Mood normal.         Behavior: Behavior normal.         Vital Signs  ED Triage Vitals [06/08/24 2111]   Temperature Pulse Respirations Blood Pressure SpO2   98 °F (36.7 °C) (!) 108 16 (!) 178/99 98 %      Temp Source Heart Rate Source Patient Position - Orthostatic VS BP Location FiO2 (%)   Oral Monitor Sitting Right arm --      Pain Score       --           Vitals:    06/08/24 2111 06/08/24 2200   BP: (!) 178/99 139/78   Pulse: (!) 108 103   Patient Position - Orthostatic VS: Sitting          Visual Acuity      ED Medications  Medications   predniSONE tablet 60 mg (60 mg Oral Given 6/8/24 2240)       Diagnostic Studies  Results Reviewed       None                   No orders to display              Procedures  Procedures         ED Course                               SBIRT 20yo+      Flowsheet Row Most Recent Value   Initial Alcohol Screen: US AUDIT-C     1. How often do you  have a drink containing alcohol? 0 Filed at: 06/08/2024 2146   2. How many drinks containing alcohol do you have on a typical day you are drinking?  0 Filed at: 06/08/2024 2146   3b. FEMALE Any Age, or MALE 65+: How often do you have 4 or more drinks on one occassion? 0 Filed at: 06/08/2024 2146   Audit-C Score 0 Filed at: 06/08/2024 2146   NABILA: How many times in the past year have you...    Used an illegal drug or used a prescription medication for non-medical reasons? Never Filed at: 06/08/2024 2146                      Medical Decision Making  44-year-old female asthmatic with new onset of generalized pruritic urticarial rash.  No signs of infection.  No signs of anaphylaxis.  Patient appears otherwise stable.  Will treat with steroids and referred to PCP.    Risk  Prescription drug management.             Disposition  Final diagnoses:   Urticaria     Time reflects when diagnosis was documented in both MDM as applicable and the Disposition within this note       Time User Action Codes Description Comment    6/8/2024 10:28 PM Jayesh Richardson Add [L50.9] Urticaria           ED Disposition       ED Disposition   Discharge    Condition   Stable    Date/Time   Sat Jun 8, 2024 2228    Comment   Arlene Francois discharge to home/self care.                   Follow-up Information       Follow up With Specialties Details Why Contact Info    Hetal Licea MD Family Medicine Call in 2 days  [address] [facility]  [address]  [place] [address]  [phone]              Discharge Medication List as of 6/8/2024 10:32 PM        START taking these medications    Details   predniSONE 20 mg tablet Multiple Dosages:Starting Sat 6/8/2024, Until Sun 6/9/2024 at 2359, THEN Starting Mon 6/10/2024, Until Wed 6/12/2024 at 2359, THEN Starting Thu 6/13/2024, Until Sat 6/15/2024 at 2359, THEN Starting Sun 6/16/2024, Until Tue 6/18/2024 at 2359Take 3 ta blets (60 mg total) by mouth daily for 2 days, THEN 2 tablets (40 mg total)  daily for 3 days, THEN 1 tablet (20 mg total) daily for 3 days, THEN 0.5 tablets (10 mg total) daily for 3 days., Normal           CONTINUE these medications which have NOT CHANGED    Details   albuterol (2.5 mg/3 mL) 0.083 % nebulizer solution Take 3 mL (2.5 mg total) by nebulization every 6 (six) hours as needed for wheezing or shortness of breath, Starting Mon 3/13/2023, Normal      !! albuterol (ProAir HFA) 90 mcg/act inhaler Inhale 2 puffs every 6 (six) hours as needed for wheezing, Starting Tue 6/13/2023, Normal      !! albuterol (PROVENTIL HFA,VENTOLIN HFA) 90 mcg/act inhaler Inhale 2 puffs every 4 (four) hours as needed for wheezing or shortness of breath, Starting Mon 3/13/2023, Normal      benzonatate (TESSALON) 200 MG capsule Take 1 capsule (200 mg total) by mouth 3 (three) times a day as needed for cough, Starting Tue 6/13/2023, Normal      betamethasone dipropionate (DIPROSONE) 0.05 % cream Apply topically 2 (two) times a day, Starting Mon 3/13/2023, Normal      clotrimazole (LOTRIMIN) 1 % cream Apply to affected area 2 times daily, Normal      fluticasone (FLONASE) 50 mcg/act nasal spray 1 spray into each nostril daily, Starting u 11/17/2022, Normal      levothyroxine 75 mcg tablet TAKE 1 TABLET BY MOUTH EVERY DAY, Normal      meclizine (ANTIVERT) 12.5 MG tablet Take 1 tablet (12.5 mg total) by mouth 3 (three) times a day as needed for dizziness, Starting Sun 6/11/2023, Normal      mometasone (ELOCON) 0.1 % cream APPLY TO AFFECTED AREA TOPICALLY EVERY DAY, Normal      ondansetron (ZOFRAN) 4 mg tablet Take 1 tablet (4 mg total) by mouth every 8 (eight) hours as needed for nausea or vomiting for up to 5 days, Starting Tue 6/13/2023, Until Sun 6/18/2023 at 2359, Normal       !! - Potential duplicate medications found. Please discuss with provider.          No discharge procedures on file.    PDMP Review       None            ED Provider  Electronically Signed by             Jayesh Richardson,  DO  06/10/24 1547

## 2024-08-09 ENCOUNTER — APPOINTMENT (OUTPATIENT)
Dept: LAB | Facility: CLINIC | Age: 44
End: 2024-08-09
Payer: COMMERCIAL

## 2024-08-09 ENCOUNTER — OFFICE VISIT (OUTPATIENT)
Dept: FAMILY MEDICINE CLINIC | Facility: CLINIC | Age: 44
End: 2024-08-09
Payer: COMMERCIAL

## 2024-08-09 VITALS
RESPIRATION RATE: 16 BRPM | HEIGHT: 62 IN | DIASTOLIC BLOOD PRESSURE: 90 MMHG | HEART RATE: 94 BPM | BODY MASS INDEX: 45.64 KG/M2 | WEIGHT: 248 LBS | OXYGEN SATURATION: 98 % | SYSTOLIC BLOOD PRESSURE: 144 MMHG | TEMPERATURE: 98.2 F

## 2024-08-09 DIAGNOSIS — R05.9 COUGH: ICD-10-CM

## 2024-08-09 DIAGNOSIS — Z11.1 SCREENING-PULMONARY TB: ICD-10-CM

## 2024-08-09 DIAGNOSIS — Z13.220 SCREENING, LIPID: ICD-10-CM

## 2024-08-09 DIAGNOSIS — E78.2 MIXED HYPERLIPIDEMIA: ICD-10-CM

## 2024-08-09 DIAGNOSIS — Z86.2 HISTORY OF IRON DEFICIENCY ANEMIA: ICD-10-CM

## 2024-08-09 DIAGNOSIS — L40.9 PSORIASIS: ICD-10-CM

## 2024-08-09 DIAGNOSIS — Z12.31 ENCOUNTER FOR SCREENING MAMMOGRAM FOR BREAST CANCER: ICD-10-CM

## 2024-08-09 DIAGNOSIS — E03.9 HYPOTHYROIDISM, UNSPECIFIED TYPE: ICD-10-CM

## 2024-08-09 DIAGNOSIS — O24.410 DIET CONTROLLED GESTATIONAL DIABETES MELLITUS (GDM) IN THIRD TRIMESTER: ICD-10-CM

## 2024-08-09 DIAGNOSIS — E03.8 OTHER SPECIFIED HYPOTHYROIDISM: ICD-10-CM

## 2024-08-09 DIAGNOSIS — Z00.00 ANNUAL PHYSICAL EXAM: Primary | ICD-10-CM

## 2024-08-09 DIAGNOSIS — J45.20 MILD INTERMITTENT ASTHMA WITHOUT COMPLICATION: ICD-10-CM

## 2024-08-09 PROBLEM — O14.93 PRE-ECLAMPSIA IN THIRD TRIMESTER: Status: RESOLVED | Noted: 2020-03-22 | Resolved: 2024-08-09

## 2024-08-09 PROBLEM — O09.529 AMA (ADVANCED MATERNAL AGE) MULTIGRAVIDA 35+: Status: RESOLVED | Noted: 2019-09-17 | Resolved: 2024-08-09

## 2024-08-09 PROBLEM — Z30.2 STATUS POST TUBAL LIGATION AT TIME OF DELIVERY, CURRENT HOSPITALIZATION: Status: RESOLVED | Noted: 2020-04-02 | Resolved: 2024-08-09

## 2024-08-09 PROBLEM — Z98.891 STATUS POST REPEAT LOW TRANSVERSE CESAREAN SECTION: Status: RESOLVED | Noted: 2020-04-02 | Resolved: 2024-08-09

## 2024-08-09 PROBLEM — Z98.891 PREVIOUS CESAREAN SECTION: Status: RESOLVED | Noted: 2019-09-17 | Resolved: 2024-08-09

## 2024-08-09 PROCEDURE — 86480 TB TEST CELL IMMUN MEASURE: CPT

## 2024-08-09 PROCEDURE — 36415 COLL VENOUS BLD VENIPUNCTURE: CPT

## 2024-08-09 PROCEDURE — 99396 PREV VISIT EST AGE 40-64: CPT | Performed by: PHYSICIAN ASSISTANT

## 2024-08-09 RX ORDER — MOMETASONE FUROATE 1 MG/G
CREAM TOPICAL DAILY
Qty: 15 G | Refills: 0 | Status: SHIPPED | OUTPATIENT
Start: 2024-08-09

## 2024-08-09 RX ORDER — ALBUTEROL SULFATE 90 UG/1
2 AEROSOL, METERED RESPIRATORY (INHALATION) EVERY 4 HOURS PRN
Qty: 8 G | Refills: 0 | Status: SHIPPED | OUTPATIENT
Start: 2024-08-09

## 2024-08-09 RX ORDER — BETAMETHASONE DIPROPIONATE 0.5 MG/G
CREAM TOPICAL 2 TIMES DAILY
Qty: 45 G | Refills: 0 | Status: SHIPPED | OUTPATIENT
Start: 2024-08-09

## 2024-08-09 RX ORDER — ALBUTEROL SULFATE 2.5 MG/3ML
2.5 SOLUTION RESPIRATORY (INHALATION) EVERY 6 HOURS PRN
Qty: 75 ML | Refills: 0 | Status: SHIPPED | OUTPATIENT
Start: 2024-08-09

## 2024-08-09 RX ORDER — LEVOTHYROXINE SODIUM 0.07 MG/1
75 TABLET ORAL DAILY
Qty: 90 TABLET | Refills: 3 | Status: SHIPPED | OUTPATIENT
Start: 2024-08-09

## 2024-08-09 NOTE — PROGRESS NOTES
Adult Annual Physical  Name: Arlene rFancois      : 1980      MRN: [id number]  Encounter Provider: Ella Mcleod PA-C  Encounter Date: 2024   Encounter department: [facility]    Assessment & Plan   1. Annual physical exam  2. Encounter for screening mammogram for breast cancer  -     Mammo screening bilateral w 3d & cad; Future; Expected date: 2024  3. Other specified hypothyroidism  -     TSH, 3rd generation with Free T4 reflex; Future  4. History of iron deficiency anemia  -     CBC and differential; Future  -     Iron Panel (Includes Ferritin, Iron Sat%, Iron, and TIBC); Future  5. Diet controlled gestational diabetes mellitus (GDM) in third trimester  -     Hemoglobin A1C  6. Screening, lipid  7. Mixed hyperlipidemia  -     Lipid Panel with Direct LDL reflex; Future  -     Comprehensive metabolic panel; Future  8. Screening-pulmonary TB  -     Quantiferon TB Gold Plus Assay; Future    Immunizations and preventive care screenings were discussed with patient today. Appropriate education was printed on patient's after visit summary.    Counseling:  Alcohol/drug use: discussed moderation in alcohol intake, the recommendations for healthy alcohol use, and avoidance of illicit drug use.  Dental Health: discussed importance of regular tooth brushing, flossing, and dental visits.  Injury prevention: discussed safety/seat belts, safety helmets, smoke detectors, carbon dioxide detectors, and smoking near bedding or upholstery.  Exercise: the importance of regular exercise/physical activity was discussed. Recommend exercise 3-5 times per week for at least 30 minutes.   Continue albuterol PRN  Restart Levothyroxine 75 mcg and check labs in 1 month  Iron studies ordered due to MATEUSZ due to menorrhagia  Quantiferon ordered for nursing school     Follow up 1 year or sooner if needed    History of Present Illness     Adult Annual Physical:  Patient presents for  "annual physical.     Diet and Physical Activity:  - Diet/Nutrition: well balanced diet.  - Exercise: walking. 10,000 steps daily    Depression Screening:  - PHQ-2 Score: 0    Review of Systems   Constitutional: Negative.    HENT: Negative.     Eyes: Negative.    Respiratory: Negative.     Cardiovascular: Negative.    Gastrointestinal: Negative.    Endocrine: Negative.    Genitourinary: Negative.    Musculoskeletal: Negative.    Skin: Negative.    Allergic/Immunologic: Negative.    Neurological: Negative.    Hematological: Negative.    Psychiatric/Behavioral: Negative.       Medical History Reviewed by provider this encounter:         Objective     /90   Pulse 94   Temp 98.2 °F (36.8 °C) (Temporal)   Resp 16   Ht 5' 2\" (1.575 m)   Wt 112 kg (248 lb)   SpO2 98%   BMI 45.36 kg/m²     Physical Exam  Constitutional:       Appearance: Normal appearance. She is well-developed and normal weight.   HENT:      Head: Normocephalic and atraumatic.      Right Ear: Hearing, tympanic membrane, ear canal and external ear normal.      Left Ear: Hearing, tympanic membrane, ear canal and external ear normal.      Nose: Nose normal.      Mouth/Throat:      Mouth: Mucous membranes are moist.      Pharynx: Oropharynx is clear. Uvula midline.   Eyes:      Extraocular Movements: Extraocular movements intact.      Conjunctiva/sclera: Conjunctivae normal.      Pupils: Pupils are equal, round, and reactive to light.   Neck:      Thyroid: No thyromegaly.   Cardiovascular:      Rate and Rhythm: Normal rate and regular rhythm.      Pulses: Normal pulses.      Heart sounds: Normal heart sounds. No murmur heard.  Pulmonary:      Effort: Pulmonary effort is normal.      Breath sounds: Normal breath sounds.   Abdominal:      General: Abdomen is flat. Bowel sounds are normal. There is no distension.      Palpations: Abdomen is soft. There is no mass.      Tenderness: There is no abdominal tenderness.   Musculoskeletal:         General: " Normal range of motion.      Cervical back: Normal range of motion and neck supple.   Lymphadenopathy:      Cervical: No cervical adenopathy.   Skin:     General: Skin is warm.   Neurological:      General: No focal deficit present.      Mental Status: She is alert and oriented to person, place, and time.      Cranial Nerves: No cranial nerve deficit.      Deep Tendon Reflexes: Reflexes normal.   Psychiatric:         Mood and Affect: Mood normal.         Behavior: Behavior normal.         Thought Content: Thought content normal.         Judgment: Judgment normal.

## 2024-08-10 LAB
GAMMA INTERFERON BACKGROUND BLD IA-ACNC: 0.01 IU/ML
M TB IFN-G BLD-IMP: NEGATIVE
M TB IFN-G CD4+ BCKGRND COR BLD-ACNC: 0.01 IU/ML
M TB IFN-G CD4+ BCKGRND COR BLD-ACNC: 0.01 IU/ML
MITOGEN IGNF BCKGRD COR BLD-ACNC: 9.99 IU/ML

## 2024-11-21 ENCOUNTER — APPOINTMENT (OUTPATIENT)
Dept: LAB | Facility: CLINIC | Age: 44
End: 2024-11-21
Payer: COMMERCIAL

## 2024-11-21 DIAGNOSIS — Z86.2 HISTORY OF IRON DEFICIENCY ANEMIA: Primary | ICD-10-CM

## 2024-11-21 DIAGNOSIS — Z86.2 HISTORY OF IRON DEFICIENCY ANEMIA: ICD-10-CM

## 2024-11-21 LAB
ALBUMIN SERPL BCG-MCNC: 3.8 G/DL (ref 3.5–5)
ALP SERPL-CCNC: 88 U/L (ref 34–104)
ALT SERPL W P-5'-P-CCNC: 14 U/L (ref 7–52)
ANION GAP SERPL CALCULATED.3IONS-SCNC: 9 MMOL/L (ref 4–13)
AST SERPL W P-5'-P-CCNC: 15 U/L (ref 13–39)
BASOPHILS # BLD AUTO: 0.03 THOUSANDS/ÂΜL (ref 0–0.1)
BASOPHILS NFR BLD AUTO: 0 % (ref 0–1)
BILIRUB SERPL-MCNC: 0.38 MG/DL (ref 0.2–1)
BUN SERPL-MCNC: 11 MG/DL (ref 5–25)
CALCIUM SERPL-MCNC: 9.2 MG/DL (ref 8.4–10.2)
CHLORIDE SERPL-SCNC: 104 MMOL/L (ref 96–108)
CHOLEST SERPL-MCNC: 202 MG/DL (ref ?–200)
CO2 SERPL-SCNC: 26 MMOL/L (ref 21–32)
CREAT SERPL-MCNC: 0.73 MG/DL (ref 0.6–1.3)
EOSINOPHIL # BLD AUTO: 0.46 THOUSAND/ÂΜL (ref 0–0.61)
EOSINOPHIL NFR BLD AUTO: 5 % (ref 0–6)
ERYTHROCYTE [DISTWIDTH] IN BLOOD BY AUTOMATED COUNT: 14.8 % (ref 11.6–15.1)
EST. AVERAGE GLUCOSE BLD GHB EST-MCNC: 128 MG/DL
FERRITIN SERPL-MCNC: 58 NG/ML (ref 11–307)
GFR SERPL CREATININE-BSD FRML MDRD: 100 ML/MIN/1.73SQ M
GLUCOSE P FAST SERPL-MCNC: 110 MG/DL (ref 65–99)
HBA1C MFR BLD: 6.1 %
HCT VFR BLD AUTO: 38.5 % (ref 34.8–46.1)
HDLC SERPL-MCNC: 67 MG/DL
HGB BLD-MCNC: 11.9 G/DL (ref 11.5–15.4)
IMM GRANULOCYTES # BLD AUTO: 0.04 THOUSAND/UL (ref 0–0.2)
IMM GRANULOCYTES NFR BLD AUTO: 0 % (ref 0–2)
IRON SATN MFR SERPL: 10 % (ref 15–50)
IRON SERPL-MCNC: 31 UG/DL (ref 50–212)
LDLC SERPL CALC-MCNC: 115 MG/DL (ref 0–100)
LYMPHOCYTES # BLD AUTO: 1.6 THOUSANDS/ÂΜL (ref 0.6–4.47)
LYMPHOCYTES NFR BLD AUTO: 18 % (ref 14–44)
MCH RBC QN AUTO: 26 PG (ref 26.8–34.3)
MCHC RBC AUTO-ENTMCNC: 30.9 G/DL (ref 31.4–37.4)
MCV RBC AUTO: 84 FL (ref 82–98)
MONOCYTES # BLD AUTO: 0.56 THOUSAND/ÂΜL (ref 0.17–1.22)
MONOCYTES NFR BLD AUTO: 6 % (ref 4–12)
NEUTROPHILS # BLD AUTO: 6.34 THOUSANDS/ÂΜL (ref 1.85–7.62)
NEUTS SEG NFR BLD AUTO: 71 % (ref 43–75)
NRBC BLD AUTO-RTO: 0 /100 WBCS
PLATELET # BLD AUTO: 307 THOUSANDS/UL (ref 149–390)
PMV BLD AUTO: 11.3 FL (ref 8.9–12.7)
POTASSIUM SERPL-SCNC: 4.2 MMOL/L (ref 3.5–5.3)
PROT SERPL-MCNC: 7.4 G/DL (ref 6.4–8.4)
RBC # BLD AUTO: 4.58 MILLION/UL (ref 3.81–5.12)
SODIUM SERPL-SCNC: 139 MMOL/L (ref 135–147)
TIBC SERPL-MCNC: 322 UG/DL (ref 250–450)
TRIGL SERPL-MCNC: 102 MG/DL (ref ?–150)
TSH SERPL DL<=0.05 MIU/L-ACNC: 2.62 UIU/ML (ref 0.45–4.5)
UIBC SERPL-MCNC: 291 UG/DL (ref 155–355)
WBC # BLD AUTO: 9.03 THOUSAND/UL (ref 4.31–10.16)

## 2024-11-21 PROCEDURE — 83550 IRON BINDING TEST: CPT

## 2024-11-21 PROCEDURE — 82728 ASSAY OF FERRITIN: CPT

## 2024-11-21 PROCEDURE — 83540 ASSAY OF IRON: CPT

## 2024-11-26 ENCOUNTER — RESULTS FOLLOW-UP (OUTPATIENT)
Dept: FAMILY MEDICINE CLINIC | Facility: CLINIC | Age: 44
End: 2024-11-26

## 2024-12-02 DIAGNOSIS — Z00.6 ENCOUNTER FOR EXAMINATION FOR NORMAL COMPARISON OR CONTROL IN CLINICAL RESEARCH PROGRAM: ICD-10-CM

## 2024-12-14 ENCOUNTER — APPOINTMENT (EMERGENCY)
Dept: RADIOLOGY | Facility: HOSPITAL | Age: 44
End: 2024-12-14
Payer: COMMERCIAL

## 2024-12-14 ENCOUNTER — HOSPITAL ENCOUNTER (EMERGENCY)
Facility: HOSPITAL | Age: 44
Discharge: HOME | End: 2024-12-14
Attending: EMERGENCY MEDICINE
Payer: COMMERCIAL

## 2024-12-14 ENCOUNTER — APPOINTMENT (EMERGENCY)
Dept: CT IMAGING | Facility: HOSPITAL | Age: 44
End: 2024-12-14
Payer: COMMERCIAL

## 2024-12-14 VITALS
HEIGHT: 62 IN | DIASTOLIC BLOOD PRESSURE: 70 MMHG | OXYGEN SATURATION: 97 % | HEART RATE: 93 BPM | BODY MASS INDEX: 49.82 KG/M2 | RESPIRATION RATE: 20 BRPM | WEIGHT: 270.73 LBS | TEMPERATURE: 97.8 F | SYSTOLIC BLOOD PRESSURE: 136 MMHG

## 2024-12-14 DIAGNOSIS — R07.9 CHEST PAIN: Primary | ICD-10-CM

## 2024-12-14 LAB
2HR DELTA HS TROPONIN: 0 NG/L
ALBUMIN SERPL BCG-MCNC: 4.1 G/DL (ref 3.5–5)
ALP SERPL-CCNC: 95 U/L (ref 34–104)
ALT SERPL W P-5'-P-CCNC: 16 U/L (ref 7–52)
ANION GAP SERPL CALCULATED.3IONS-SCNC: 8 MMOL/L (ref 4–13)
APTT PPP: 26 SECONDS (ref 23–34)
AST SERPL W P-5'-P-CCNC: 13 U/L (ref 13–39)
ATRIAL RATE: 108 BPM
BASOPHILS # BLD AUTO: 0.04 THOUSANDS/ÂΜL (ref 0–0.1)
BASOPHILS NFR BLD AUTO: 0 % (ref 0–1)
BILIRUB SERPL-MCNC: 0.28 MG/DL (ref 0.2–1)
BNP SERPL-MCNC: 70 PG/ML (ref 0–100)
BUN SERPL-MCNC: 12 MG/DL (ref 5–25)
CALCIUM SERPL-MCNC: 9.4 MG/DL (ref 8.4–10.2)
CARDIAC TROPONIN I PNL SERPL HS: 3 NG/L (ref ?–50)
CARDIAC TROPONIN I PNL SERPL HS: 3 NG/L (ref ?–50)
CHLORIDE SERPL-SCNC: 104 MMOL/L (ref 96–108)
CO2 SERPL-SCNC: 28 MMOL/L (ref 21–32)
CREAT SERPL-MCNC: 0.67 MG/DL (ref 0.6–1.3)
D DIMER PPP FEU-MCNC: 0.52 UG/ML FEU
EOSINOPHIL # BLD AUTO: 0.49 THOUSAND/ÂΜL (ref 0–0.61)
EOSINOPHIL NFR BLD AUTO: 5 % (ref 0–6)
ERYTHROCYTE [DISTWIDTH] IN BLOOD BY AUTOMATED COUNT: 14.5 % (ref 11.6–15.1)
GFR SERPL CREATININE-BSD FRML MDRD: 107 ML/MIN/1.73SQ M
GLUCOSE SERPL-MCNC: 111 MG/DL (ref 65–140)
HCT VFR BLD AUTO: 39.1 % (ref 34.8–46.1)
HGB BLD-MCNC: 12.3 G/DL (ref 11.5–15.4)
IMM GRANULOCYTES # BLD AUTO: 0.07 THOUSAND/UL (ref 0–0.2)
IMM GRANULOCYTES NFR BLD AUTO: 1 % (ref 0–2)
INR PPP: 0.98 (ref 0.85–1.19)
LYMPHOCYTES # BLD AUTO: 1.77 THOUSANDS/ÂΜL (ref 0.6–4.47)
LYMPHOCYTES NFR BLD AUTO: 18 % (ref 14–44)
MCH RBC QN AUTO: 26.3 PG (ref 26.8–34.3)
MCHC RBC AUTO-ENTMCNC: 31.5 G/DL (ref 31.4–37.4)
MCV RBC AUTO: 84 FL (ref 82–98)
MONOCYTES # BLD AUTO: 0.7 THOUSAND/ÂΜL (ref 0.17–1.22)
MONOCYTES NFR BLD AUTO: 7 % (ref 4–12)
NEUTROPHILS # BLD AUTO: 7.04 THOUSANDS/ÂΜL (ref 1.85–7.62)
NEUTS SEG NFR BLD AUTO: 69 % (ref 43–75)
NRBC BLD AUTO-RTO: 0 /100 WBCS
P AXIS: 63 DEGREES
PLATELET # BLD AUTO: 309 THOUSANDS/UL (ref 149–390)
PMV BLD AUTO: 10.5 FL (ref 8.9–12.7)
POTASSIUM SERPL-SCNC: 3.6 MMOL/L (ref 3.5–5.3)
PR INTERVAL: 124 MS
PROT SERPL-MCNC: 8 G/DL (ref 6.4–8.4)
PROTHROMBIN TIME: 13.5 SECONDS (ref 12.3–15)
QRS AXIS: 54 DEGREES
QRSD INTERVAL: 68 MS
QT INTERVAL: 322 MS
QTC INTERVAL: 431 MS
RBC # BLD AUTO: 4.68 MILLION/UL (ref 3.81–5.12)
SODIUM SERPL-SCNC: 140 MMOL/L (ref 135–147)
T WAVE AXIS: 51 DEGREES
VENTRICULAR RATE: 108 BPM
WBC # BLD AUTO: 10.11 THOUSAND/UL (ref 4.31–10.16)

## 2024-12-14 PROCEDURE — 83880 ASSAY OF NATRIURETIC PEPTIDE: CPT | Performed by: EMERGENCY MEDICINE

## 2024-12-14 PROCEDURE — 80053 COMPREHEN METABOLIC PANEL: CPT | Performed by: EMERGENCY MEDICINE

## 2024-12-14 PROCEDURE — 99285 EMERGENCY DEPT VISIT HI MDM: CPT | Performed by: EMERGENCY MEDICINE

## 2024-12-14 PROCEDURE — 85730 THROMBOPLASTIN TIME PARTIAL: CPT | Performed by: EMERGENCY MEDICINE

## 2024-12-14 PROCEDURE — 96365 THER/PROPH/DIAG IV INF INIT: CPT

## 2024-12-14 PROCEDURE — 99285 EMERGENCY DEPT VISIT HI MDM: CPT

## 2024-12-14 PROCEDURE — 85025 COMPLETE CBC W/AUTO DIFF WBC: CPT | Performed by: EMERGENCY MEDICINE

## 2024-12-14 PROCEDURE — 96375 TX/PRO/DX INJ NEW DRUG ADDON: CPT

## 2024-12-14 PROCEDURE — 36415 COLL VENOUS BLD VENIPUNCTURE: CPT | Performed by: EMERGENCY MEDICINE

## 2024-12-14 PROCEDURE — 85379 FIBRIN DEGRADATION QUANT: CPT | Performed by: EMERGENCY MEDICINE

## 2024-12-14 PROCEDURE — 85610 PROTHROMBIN TIME: CPT | Performed by: EMERGENCY MEDICINE

## 2024-12-14 PROCEDURE — 71045 X-RAY EXAM CHEST 1 VIEW: CPT

## 2024-12-14 PROCEDURE — 93005 ELECTROCARDIOGRAM TRACING: CPT

## 2024-12-14 PROCEDURE — 71275 CT ANGIOGRAPHY CHEST: CPT

## 2024-12-14 PROCEDURE — 84484 ASSAY OF TROPONIN QUANT: CPT | Performed by: EMERGENCY MEDICINE

## 2024-12-14 RX ORDER — ASPIRIN 81 MG/1
324 TABLET, CHEWABLE ORAL ONCE
Status: COMPLETED | OUTPATIENT
Start: 2024-12-14 | End: 2024-12-14

## 2024-12-14 RX ORDER — FENTANYL CITRATE 50 UG/ML
25 INJECTION, SOLUTION INTRAMUSCULAR; INTRAVENOUS ONCE
Refills: 0 | Status: COMPLETED | OUTPATIENT
Start: 2024-12-14 | End: 2024-12-14

## 2024-12-14 RX ORDER — ONDANSETRON 2 MG/ML
4 INJECTION INTRAMUSCULAR; INTRAVENOUS ONCE
Status: COMPLETED | OUTPATIENT
Start: 2024-12-14 | End: 2024-12-14

## 2024-12-14 RX ORDER — ACETAMINOPHEN 10 MG/ML
1000 INJECTION, SOLUTION INTRAVENOUS ONCE
Status: COMPLETED | OUTPATIENT
Start: 2024-12-14 | End: 2024-12-14

## 2024-12-14 RX ADMIN — ACETAMINOPHEN 1000 MG: 10 INJECTION INTRAVENOUS at 18:43

## 2024-12-14 RX ADMIN — FENTANYL CITRATE 25 MCG: 50 INJECTION, SOLUTION INTRAMUSCULAR; INTRAVENOUS at 19:48

## 2024-12-14 RX ADMIN — IOHEXOL 75 ML: 350 INJECTION, SOLUTION INTRAVENOUS at 20:56

## 2024-12-14 RX ADMIN — ASPIRIN 81 MG CHEWABLE TABLET 324 MG: 81 TABLET CHEWABLE at 18:40

## 2024-12-14 RX ADMIN — ONDANSETRON 4 MG: 2 INJECTION, SOLUTION INTRAMUSCULAR; INTRAVENOUS at 18:54

## 2024-12-16 LAB
ATRIAL RATE: 108 BPM
P AXIS: 63 DEGREES
PR INTERVAL: 124 MS
QRS AXIS: 54 DEGREES
QRSD INTERVAL: 68 MS
QT INTERVAL: 322 MS
QTC INTERVAL: 431 MS
T WAVE AXIS: 51 DEGREES
VENTRICULAR RATE: 108 BPM

## 2024-12-16 PROCEDURE — 93010 ELECTROCARDIOGRAM REPORT: CPT | Performed by: INTERNAL MEDICINE

## 2024-12-26 ENCOUNTER — APPOINTMENT (OUTPATIENT)
Dept: LAB | Facility: CLINIC | Age: 44
End: 2024-12-26

## 2024-12-26 DIAGNOSIS — Z00.6 ENCOUNTER FOR EXAMINATION FOR NORMAL COMPARISON OR CONTROL IN CLINICAL RESEARCH PROGRAM: ICD-10-CM

## 2024-12-26 PROCEDURE — 36415 COLL VENOUS BLD VENIPUNCTURE: CPT

## 2025-01-06 LAB
APOB+LDLR+PCSK9 GENE MUT ANL BLD/T: NOT DETECTED
BRCA1+BRCA2 DEL+DUP + FULL MUT ANL BLD/T: NOT DETECTED
MLH1+MSH2+MSH6+PMS2 GN DEL+DUP+FUL M: NOT DETECTED

## 2025-03-16 DIAGNOSIS — J45.20 MILD INTERMITTENT ASTHMA WITHOUT COMPLICATION: ICD-10-CM

## 2025-03-18 RX ORDER — ALBUTEROL SULFATE 90 UG/1
INHALANT RESPIRATORY (INHALATION)
Qty: 6.7 G | Refills: 5 | Status: SHIPPED | OUTPATIENT
Start: 2025-03-18

## 2025-05-20 DIAGNOSIS — Z11.1 SCREENING-PULMONARY TB: Primary | ICD-10-CM

## 2025-05-22 ENCOUNTER — APPOINTMENT (OUTPATIENT)
Dept: LAB | Facility: CLINIC | Age: 45
End: 2025-05-22
Payer: COMMERCIAL

## 2025-05-22 DIAGNOSIS — Z11.1 SCREENING-PULMONARY TB: ICD-10-CM

## 2025-05-22 PROCEDURE — 86480 TB TEST CELL IMMUN MEASURE: CPT

## 2025-05-22 PROCEDURE — 36415 COLL VENOUS BLD VENIPUNCTURE: CPT

## 2025-05-23 LAB
GAMMA INTERFERON BACKGROUND BLD IA-ACNC: 0.04 IU/ML
M TB IFN-G BLD-IMP: NEGATIVE
M TB IFN-G CD4+ BCKGRND COR BLD-ACNC: 0 IU/ML
M TB IFN-G CD4+ BCKGRND COR BLD-ACNC: 0.03 IU/ML
MITOGEN IGNF BCKGRD COR BLD-ACNC: 7.62 IU/ML

## (undated) DEVICE — GAUZE SPONGES,16 PLY: Brand: CURITY

## (undated) DEVICE — DRESSING MEPILEX BORDER 4 X 10 IN

## (undated) DEVICE — SUT MONOCRYL 0 CTX 36 IN Y398H

## (undated) DEVICE — SUT VICRYL 4-0 KS 27 IN J662H

## (undated) DEVICE — SUT VICRYL 0 CT-1 36 IN J946H

## (undated) DEVICE — SUT PDS II 1 CTX 36 IN Z371T

## (undated) DEVICE — PACK C-SECTION PBDS

## (undated) DEVICE — CHLORAPREP HI-LITE 26ML ORANGE

## (undated) DEVICE — GLOVE SRG BIOGEL ECLIPSE 6.5

## (undated) DEVICE — GLOVE INDICATOR PI UNDERGLOVE SZ 7 BLUE

## (undated) DEVICE — Device

## (undated) DEVICE — SKIN MARKER DUAL TIP WITH RULER CAP, FLEXIBLE RULER AND LABELS: Brand: DEVON

## (undated) DEVICE — SUT PLAIN 2-0 CTX 27 IN 872H